# Patient Record
Sex: FEMALE | Race: WHITE | NOT HISPANIC OR LATINO | Employment: FULL TIME | URBAN - METROPOLITAN AREA
[De-identification: names, ages, dates, MRNs, and addresses within clinical notes are randomized per-mention and may not be internally consistent; named-entity substitution may affect disease eponyms.]

---

## 2018-02-16 ENCOUNTER — OFFICE VISIT CONVERTED (OUTPATIENT)
Dept: FAMILY MEDICINE CLINIC | Facility: CLINIC | Age: 53
End: 2018-02-16
Attending: NURSE PRACTITIONER

## 2018-03-14 ENCOUNTER — OFFICE VISIT CONVERTED (OUTPATIENT)
Dept: FAMILY MEDICINE CLINIC | Facility: CLINIC | Age: 53
End: 2018-03-14
Attending: NURSE PRACTITIONER

## 2018-03-27 ENCOUNTER — CONVERSION ENCOUNTER (OUTPATIENT)
Dept: GENERAL RADIOLOGY | Facility: HOSPITAL | Age: 53
End: 2018-03-27

## 2018-08-24 ENCOUNTER — OFFICE VISIT CONVERTED (OUTPATIENT)
Dept: FAMILY MEDICINE CLINIC | Facility: CLINIC | Age: 53
End: 2018-08-24
Attending: NURSE PRACTITIONER

## 2018-09-14 ENCOUNTER — OFFICE VISIT CONVERTED (OUTPATIENT)
Dept: SURGERY | Facility: CLINIC | Age: 53
End: 2018-09-14
Attending: SURGERY

## 2019-01-17 ENCOUNTER — HOSPITAL ENCOUNTER (OUTPATIENT)
Dept: FAMILY MEDICINE CLINIC | Facility: CLINIC | Age: 54
Discharge: HOME | End: 2019-01-17
Attending: NURSE PRACTITIONER

## 2019-01-23 LAB
CODEINE UR QL: <20 NG/ML
CONV 6-MAM (LCMSMS): <10 NG/ML
CONV OPIATES URINE NOROXYMORPHONE: <20 NG/ML
HYDROCODONE UR QL: <20 NG/ML
HYDROMORPHONE UR QL: <20 NG/ML
MORPHINE UR QL: <20 NG/ML
OPIATES, URINE, NORHYDROCODONE: <20 NG/ML
OPIATES, URINE, NOROXYCODONE: <20 NG/ML
OXYCODONE UR: <20 NG/ML
OXYMORPHONE UR: <20 NG/ML

## 2019-02-11 ENCOUNTER — OFFICE VISIT CONVERTED (OUTPATIENT)
Dept: FAMILY MEDICINE CLINIC | Facility: CLINIC | Age: 54
End: 2019-02-11
Attending: NURSE PRACTITIONER

## 2019-03-11 ENCOUNTER — HOSPITAL ENCOUNTER (OUTPATIENT)
Dept: LAB | Facility: HOSPITAL | Age: 54
Discharge: HOME | End: 2019-03-11
Attending: NURSE PRACTITIONER

## 2019-03-11 LAB
ALBUMIN SERPL-MCNC: 3.9 G/DL (ref 3.5–5)
ALBUMIN/GLOB SERPL: 1.2 {RATIO} (ref 1.4–2.6)
ALP SERPL-CCNC: 65 U/L (ref 53–141)
ALT SERPL-CCNC: 18 U/L (ref 10–40)
ANION GAP SERPL CALC-SCNC: 17 MMOL/L (ref 8–19)
APPEARANCE UR: ABNORMAL
AST SERPL-CCNC: 21 U/L (ref 15–50)
BASOPHILS # BLD AUTO: 0.02 10*3/UL (ref 0–0.2)
BASOPHILS NFR BLD AUTO: 0.3 % (ref 0–3)
BILIRUB SERPL-MCNC: 0.45 MG/DL (ref 0.2–1.3)
BILIRUB UR QL: NEGATIVE
BUN SERPL-MCNC: 9 MG/DL (ref 5–25)
BUN/CREAT SERPL: 15 {RATIO} (ref 6–20)
CALCIUM SERPL-MCNC: 8.8 MG/DL (ref 8.7–10.4)
CHLORIDE SERPL-SCNC: 100 MMOL/L (ref 99–111)
CHOLEST SERPL-MCNC: 130 MG/DL (ref 107–200)
CHOLEST/HDLC SERPL: 3.3 {RATIO} (ref 3–6)
COLOR UR: YELLOW
CONV ABS IMM GRAN: 0.02 10*3/UL (ref 0–0.2)
CONV BACTERIA: ABNORMAL
CONV CO2: 26 MMOL/L (ref 22–32)
CONV COLLECTION SOURCE (UA): ABNORMAL
CONV CREATININE URINE, RANDOM: 128.9 MG/DL (ref 10–300)
CONV HYALINE CASTS IN URINE MICRO: ABNORMAL /[LPF]
CONV IMMATURE GRAN: 0.3 % (ref 0–1.8)
CONV MICROALBUM.,U,RANDOM: <12 MG/L (ref 0–20)
CONV TOTAL PROTEIN: 7.2 G/DL (ref 6.3–8.2)
CONV UROBILINOGEN IN URINE BY AUTOMATED TEST STRIP: 1 {EHRLICHU}/DL (ref 0.1–1)
CREAT UR-MCNC: 0.62 MG/DL (ref 0.5–0.9)
DEPRECATED RDW RBC AUTO: 43.7 FL (ref 36.4–46.3)
EOSINOPHIL # BLD AUTO: 0.14 10*3/UL (ref 0–0.7)
EOSINOPHIL # BLD AUTO: 2.4 % (ref 0–7)
ERYTHROCYTE [DISTWIDTH] IN BLOOD BY AUTOMATED COUNT: 13.1 % (ref 11.7–14.4)
EST. AVERAGE GLUCOSE BLD GHB EST-MCNC: 148 MG/DL
GFR SERPLBLD BASED ON 1.73 SQ M-ARVRAT: >60 ML/MIN/{1.73_M2}
GLOBULIN UR ELPH-MCNC: 3.3 G/DL (ref 2–3.5)
GLUCOSE SERPL-MCNC: 145 MG/DL (ref 65–99)
GLUCOSE UR QL: NEGATIVE MG/DL
HBA1C MFR BLD: 13 G/DL (ref 12–16)
HBA1C MFR BLD: 6.8 % (ref 3.5–5.7)
HCT VFR BLD AUTO: 40.6 % (ref 37–47)
HDLC SERPL-MCNC: 39 MG/DL (ref 40–60)
HGB UR QL STRIP: NEGATIVE
KETONES UR QL STRIP: NEGATIVE MG/DL
LDLC SERPL CALC-MCNC: 69 MG/DL (ref 70–100)
LEUKOCYTE ESTERASE UR QL STRIP: ABNORMAL
LYMPHOCYTES # BLD AUTO: 1.93 10*3/UL (ref 1–5)
MAGNESIUM SERPL-MCNC: 1.92 MG/DL (ref 1.6–2.3)
MCH RBC QN AUTO: 29.2 PG (ref 27–31)
MCHC RBC AUTO-ENTMCNC: 32 G/DL (ref 33–37)
MCV RBC AUTO: 91.2 FL (ref 81–99)
MICROALBUMIN/CREAT UR: 9.3 MG/G{CRE} (ref 0–35)
MONOCYTES # BLD AUTO: 0.32 10*3/UL (ref 0.2–1.2)
MONOCYTES NFR BLD AUTO: 5.4 % (ref 3–10)
NEUTROPHILS # BLD AUTO: 3.52 10*3/UL (ref 2–8)
NEUTROPHILS NFR BLD AUTO: 59.2 % (ref 30–85)
NITRITE UR QL STRIP: NEGATIVE
NRBC CBCN: 0 % (ref 0–0.7)
OSMOLALITY SERPL CALC.SUM OF ELEC: 289 MOSM/KG (ref 273–304)
PH UR STRIP.AUTO: 6.5 [PH] (ref 5–8)
PLATELET # BLD AUTO: 305 10*3/UL (ref 130–400)
PMV BLD AUTO: 11 FL (ref 9.4–12.3)
POTASSIUM SERPL-SCNC: 4.1 MMOL/L (ref 3.5–5.3)
PROT UR QL: NEGATIVE MG/DL
RBC # BLD AUTO: 4.45 10*6/UL (ref 4.2–5.4)
RBC #/AREA URNS HPF: ABNORMAL /[HPF]
SODIUM SERPL-SCNC: 139 MMOL/L (ref 135–147)
SP GR UR: 1.02 (ref 1–1.03)
SQUAMOUS SPT QL MICRO: ABNORMAL /[HPF]
TRIGL SERPL-MCNC: 111 MG/DL (ref 40–150)
VARIANT LYMPHS NFR BLD MANUAL: 32.4 % (ref 20–45)
VLDLC SERPL-MCNC: 22 MG/DL (ref 5–37)
WBC # BLD AUTO: 5.95 10*3/UL (ref 4.8–10.8)
WBC #/AREA URNS HPF: ABNORMAL /[HPF]

## 2019-04-17 ENCOUNTER — OFFICE VISIT CONVERTED (OUTPATIENT)
Dept: CARDIOLOGY | Facility: CLINIC | Age: 54
End: 2019-04-17
Attending: INTERNAL MEDICINE

## 2019-05-10 ENCOUNTER — HOSPITAL ENCOUNTER (OUTPATIENT)
Dept: NUCLEAR MEDICINE | Facility: HOSPITAL | Age: 54
Discharge: HOME | End: 2019-05-10
Attending: INTERNAL MEDICINE

## 2019-06-05 ENCOUNTER — CONVERSION ENCOUNTER (OUTPATIENT)
Dept: FAMILY MEDICINE CLINIC | Facility: CLINIC | Age: 54
End: 2019-06-05

## 2019-06-05 ENCOUNTER — OFFICE VISIT CONVERTED (OUTPATIENT)
Dept: FAMILY MEDICINE CLINIC | Facility: CLINIC | Age: 54
End: 2019-06-05
Attending: NURSE PRACTITIONER

## 2019-06-13 ENCOUNTER — HOSPITAL ENCOUNTER (OUTPATIENT)
Dept: OTHER | Facility: HOSPITAL | Age: 54
Discharge: HOME | End: 2019-06-13
Attending: NURSE PRACTITIONER

## 2019-06-13 LAB
ALBUMIN SERPL-MCNC: 4.1 G/DL (ref 3.5–5)
ALBUMIN/GLOB SERPL: 1.4 {RATIO} (ref 1.4–2.6)
ALP SERPL-CCNC: 56 U/L (ref 53–141)
ALT SERPL-CCNC: 17 U/L (ref 10–40)
ANION GAP SERPL CALC-SCNC: 19 MMOL/L (ref 8–19)
AST SERPL-CCNC: 16 U/L (ref 15–50)
BILIRUB SERPL-MCNC: 0.52 MG/DL (ref 0.2–1.3)
BUN SERPL-MCNC: 11 MG/DL (ref 5–25)
BUN/CREAT SERPL: 15 {RATIO} (ref 6–20)
CALCIUM SERPL-MCNC: 8.9 MG/DL (ref 8.7–10.4)
CHLORIDE SERPL-SCNC: 101 MMOL/L (ref 99–111)
CHOLEST SERPL-MCNC: 119 MG/DL (ref 107–200)
CHOLEST/HDLC SERPL: 3.6 {RATIO} (ref 3–6)
CONV CO2: 28 MMOL/L (ref 22–32)
CONV CREATININE URINE, RANDOM: 60.1 MG/DL (ref 10–300)
CONV MICROALBUM.,U,RANDOM: <12 MG/L (ref 0–20)
CONV TOTAL PROTEIN: 7.1 G/DL (ref 6.3–8.2)
CREAT UR-MCNC: 0.71 MG/DL (ref 0.5–0.9)
EST. AVERAGE GLUCOSE BLD GHB EST-MCNC: 131 MG/DL
GFR SERPLBLD BASED ON 1.73 SQ M-ARVRAT: >60 ML/MIN/{1.73_M2}
GLOBULIN UR ELPH-MCNC: 3 G/DL (ref 2–3.5)
GLUCOSE SERPL-MCNC: 121 MG/DL (ref 65–99)
HBA1C MFR BLD: 6.2 % (ref 3.5–5.7)
HDLC SERPL-MCNC: 33 MG/DL (ref 40–60)
LDLC SERPL CALC-MCNC: 63 MG/DL (ref 70–100)
MICROALBUMIN/CREAT UR: 20 MG/G{CRE} (ref 0–35)
OSMOLALITY SERPL CALC.SUM OF ELEC: 299 MOSM/KG (ref 273–304)
POTASSIUM SERPL-SCNC: 4.4 MMOL/L (ref 3.5–5.3)
SODIUM SERPL-SCNC: 144 MMOL/L (ref 135–147)
TRIGL SERPL-MCNC: 116 MG/DL (ref 40–150)
VLDLC SERPL-MCNC: 23 MG/DL (ref 5–37)

## 2019-06-19 ENCOUNTER — HOSPITAL ENCOUNTER (OUTPATIENT)
Dept: GENERAL RADIOLOGY | Facility: HOSPITAL | Age: 54
Discharge: HOME | End: 2019-06-19
Attending: NURSE PRACTITIONER

## 2019-06-28 ENCOUNTER — OFFICE VISIT CONVERTED (OUTPATIENT)
Dept: SURGERY | Facility: CLINIC | Age: 54
End: 2019-06-28
Attending: SURGERY

## 2019-06-28 ENCOUNTER — CONVERSION ENCOUNTER (OUTPATIENT)
Dept: SURGERY | Facility: CLINIC | Age: 54
End: 2019-06-28

## 2019-07-15 ENCOUNTER — HOSPITAL ENCOUNTER (OUTPATIENT)
Dept: GENERAL RADIOLOGY | Facility: HOSPITAL | Age: 54
Discharge: HOME | End: 2019-07-15
Attending: NURSE PRACTITIONER

## 2019-07-31 ENCOUNTER — HOSPITAL ENCOUNTER (OUTPATIENT)
Dept: PERIOP | Facility: HOSPITAL | Age: 54
Setting detail: HOSPITAL OUTPATIENT SURGERY
Discharge: HOME | End: 2019-07-31
Attending: SURGERY

## 2019-07-31 LAB
ANION GAP SERPL CALC-SCNC: 17 MMOL/L (ref 8–19)
BUN SERPL-MCNC: 12 MG/DL (ref 5–25)
BUN/CREAT SERPL: 22 {RATIO} (ref 6–20)
CALCIUM SERPL-MCNC: 8.7 MG/DL (ref 8.7–10.4)
CHLORIDE SERPL-SCNC: 102 MMOL/L (ref 99–111)
CONV CO2: 24 MMOL/L (ref 22–32)
CREAT UR-MCNC: 0.55 MG/DL (ref 0.5–0.9)
GFR SERPLBLD BASED ON 1.73 SQ M-ARVRAT: >60 ML/MIN/{1.73_M2}
GLUCOSE BLD-MCNC: 185 MG/DL (ref 65–99)
GLUCOSE SERPL-MCNC: 143 MG/DL (ref 65–99)
OSMOLALITY SERPL CALC.SUM OF ELEC: 290 MOSM/KG (ref 273–304)
POTASSIUM SERPL-SCNC: 4.2 MMOL/L (ref 3.5–5.3)
SODIUM SERPL-SCNC: 139 MMOL/L (ref 135–147)

## 2019-08-16 ENCOUNTER — OFFICE VISIT CONVERTED (OUTPATIENT)
Dept: SURGERY | Facility: CLINIC | Age: 54
End: 2019-08-16
Attending: SURGERY

## 2019-12-05 ENCOUNTER — CONVERSION ENCOUNTER (OUTPATIENT)
Dept: CARDIOLOGY | Facility: CLINIC | Age: 54
End: 2019-12-05

## 2019-12-05 ENCOUNTER — OFFICE VISIT CONVERTED (OUTPATIENT)
Dept: CARDIOLOGY | Facility: CLINIC | Age: 54
End: 2019-12-05
Attending: INTERNAL MEDICINE

## 2019-12-09 ENCOUNTER — OFFICE VISIT CONVERTED (OUTPATIENT)
Dept: FAMILY MEDICINE CLINIC | Facility: CLINIC | Age: 54
End: 2019-12-09
Attending: NURSE PRACTITIONER

## 2019-12-20 ENCOUNTER — HOSPITAL ENCOUNTER (OUTPATIENT)
Dept: LAB | Facility: HOSPITAL | Age: 54
Discharge: HOME | End: 2019-12-20
Attending: NURSE PRACTITIONER

## 2019-12-20 LAB
ALBUMIN SERPL-MCNC: 4.3 G/DL (ref 3.5–5)
ALBUMIN/GLOB SERPL: 1.6 {RATIO} (ref 1.4–2.6)
ALP SERPL-CCNC: 60 U/L (ref 53–141)
ALT SERPL-CCNC: 12 U/L (ref 10–40)
ANION GAP SERPL CALC-SCNC: 17 MMOL/L (ref 8–19)
APPEARANCE UR: ABNORMAL
AST SERPL-CCNC: 15 U/L (ref 15–50)
BILIRUB SERPL-MCNC: 0.39 MG/DL (ref 0.2–1.3)
BILIRUB UR QL: NEGATIVE
BUN SERPL-MCNC: 10 MG/DL (ref 5–25)
BUN/CREAT SERPL: 16 {RATIO} (ref 6–20)
CALCIUM SERPL-MCNC: 8.8 MG/DL (ref 8.7–10.4)
CHLORIDE SERPL-SCNC: 101 MMOL/L (ref 99–111)
CHOLEST SERPL-MCNC: 124 MG/DL (ref 107–200)
CHOLEST/HDLC SERPL: 4.1 {RATIO} (ref 3–6)
COLOR UR: ABNORMAL
CONV BACTERIA: NEGATIVE
CONV CO2: 27 MMOL/L (ref 22–32)
CONV COLLECTION SOURCE (UA): ABNORMAL
CONV CREATININE URINE, RANDOM: 228.4 MG/DL (ref 10–300)
CONV HYALINE CASTS IN URINE MICRO: ABNORMAL /[LPF]
CONV MICROALBUM.,U,RANDOM: 13 MG/L (ref 0–20)
CONV TOTAL PROTEIN: 7 G/DL (ref 6.3–8.2)
CONV UROBILINOGEN IN URINE BY AUTOMATED TEST STRIP: 1 {EHRLICHU}/DL (ref 0.1–1)
CREAT UR-MCNC: 0.61 MG/DL (ref 0.5–0.9)
EST. AVERAGE GLUCOSE BLD GHB EST-MCNC: 151 MG/DL
GFR SERPLBLD BASED ON 1.73 SQ M-ARVRAT: >60 ML/MIN/{1.73_M2}
GLOBULIN UR ELPH-MCNC: 2.7 G/DL (ref 2–3.5)
GLUCOSE SERPL-MCNC: 142 MG/DL (ref 65–99)
GLUCOSE UR QL: NEGATIVE MG/DL
HBA1C MFR BLD: 6.9 % (ref 3.5–5.7)
HDLC SERPL-MCNC: 30 MG/DL (ref 40–60)
HGB UR QL STRIP: NEGATIVE
KETONES UR QL STRIP: NEGATIVE MG/DL
LDLC SERPL CALC-MCNC: 63 MG/DL (ref 70–100)
LEUKOCYTE ESTERASE UR QL STRIP: ABNORMAL
MAGNESIUM SERPL-MCNC: 1.8 MG/DL (ref 1.6–2.3)
MICROALBUMIN/CREAT UR: 5.7 MG/G{CRE} (ref 0–35)
NITRITE UR QL STRIP: NEGATIVE
OSMOLALITY SERPL CALC.SUM OF ELEC: 293 MOSM/KG (ref 273–304)
PH UR STRIP.AUTO: 5 [PH] (ref 5–8)
POTASSIUM SERPL-SCNC: 4.2 MMOL/L (ref 3.5–5.3)
PROT UR QL: NEGATIVE MG/DL
RBC #/AREA URNS HPF: ABNORMAL /[HPF]
SODIUM SERPL-SCNC: 141 MMOL/L (ref 135–147)
SP GR UR: 1.03 (ref 1–1.03)
SQUAMOUS SPT QL MICRO: ABNORMAL /[HPF]
T4 FREE SERPL-MCNC: 1.3 NG/DL (ref 0.9–1.8)
TRIGL SERPL-MCNC: 155 MG/DL (ref 40–150)
TSH SERPL-ACNC: 0.93 M[IU]/L (ref 0.27–4.2)
VLDLC SERPL-MCNC: 31 MG/DL (ref 5–37)
WBC #/AREA URNS HPF: ABNORMAL /[HPF]

## 2020-06-24 ENCOUNTER — HOSPITAL ENCOUNTER (OUTPATIENT)
Dept: GENERAL RADIOLOGY | Facility: HOSPITAL | Age: 55
Discharge: HOME | End: 2020-06-24
Attending: NURSE PRACTITIONER

## 2020-09-23 ENCOUNTER — HOSPITAL ENCOUNTER (OUTPATIENT)
Dept: GENERAL RADIOLOGY | Facility: HOSPITAL | Age: 55
Discharge: HOME | End: 2020-09-23
Attending: NURSE PRACTITIONER

## 2020-11-25 ENCOUNTER — OFFICE VISIT CONVERTED (OUTPATIENT)
Dept: FAMILY MEDICINE CLINIC | Facility: CLINIC | Age: 55
End: 2020-11-25
Attending: NURSE PRACTITIONER

## 2020-11-25 ENCOUNTER — HOSPITAL ENCOUNTER (OUTPATIENT)
Dept: LAB | Facility: HOSPITAL | Age: 55
Discharge: HOME | End: 2020-11-25
Attending: NURSE PRACTITIONER

## 2020-11-25 LAB
ALBUMIN SERPL-MCNC: 4.3 G/DL (ref 3.5–5)
ALBUMIN/GLOB SERPL: 1.5 {RATIO} (ref 1.4–2.6)
ALP SERPL-CCNC: 65 U/L (ref 53–141)
ALT SERPL-CCNC: 17 U/L (ref 10–40)
ANION GAP SERPL CALC-SCNC: 18 MMOL/L (ref 8–19)
AST SERPL-CCNC: 26 U/L (ref 15–50)
BILIRUB SERPL-MCNC: 0.44 MG/DL (ref 0.2–1.3)
BUN SERPL-MCNC: 10 MG/DL (ref 5–25)
BUN/CREAT SERPL: 14 {RATIO} (ref 6–20)
CALCIUM SERPL-MCNC: 8.9 MG/DL (ref 8.7–10.4)
CHLORIDE SERPL-SCNC: 102 MMOL/L (ref 99–111)
CHOLEST SERPL-MCNC: 124 MG/DL (ref 107–200)
CHOLEST/HDLC SERPL: 3.6 {RATIO} (ref 3–6)
CONV CO2: 23 MMOL/L (ref 22–32)
CONV TOTAL PROTEIN: 7.1 G/DL (ref 6.3–8.2)
CREAT UR-MCNC: 0.73 MG/DL (ref 0.5–0.9)
EST. AVERAGE GLUCOSE BLD GHB EST-MCNC: 189 MG/DL
GFR SERPLBLD BASED ON 1.73 SQ M-ARVRAT: >60 ML/MIN/{1.73_M2}
GLOBULIN UR ELPH-MCNC: 2.8 G/DL (ref 2–3.5)
GLUCOSE SERPL-MCNC: 151 MG/DL (ref 65–99)
HBA1C MFR BLD: 8.2 % (ref 3.5–5.7)
HDLC SERPL-MCNC: 34 MG/DL (ref 40–60)
LDLC SERPL CALC-MCNC: 48 MG/DL (ref 70–100)
OSMOLALITY SERPL CALC.SUM OF ELEC: 290 MOSM/KG (ref 273–304)
POTASSIUM SERPL-SCNC: 4.4 MMOL/L (ref 3.5–5.3)
SODIUM SERPL-SCNC: 139 MMOL/L (ref 135–147)
TRIGL SERPL-MCNC: 209 MG/DL (ref 40–150)
VLDLC SERPL-MCNC: 42 MG/DL (ref 5–37)

## 2020-12-09 ENCOUNTER — OFFICE VISIT CONVERTED (OUTPATIENT)
Dept: FAMILY MEDICINE CLINIC | Facility: CLINIC | Age: 55
End: 2020-12-09
Attending: NURSE PRACTITIONER

## 2020-12-09 ENCOUNTER — CONVERSION ENCOUNTER (OUTPATIENT)
Dept: FAMILY MEDICINE CLINIC | Facility: CLINIC | Age: 55
End: 2020-12-09

## 2021-05-14 VITALS
OXYGEN SATURATION: 97 % | WEIGHT: 293 LBS | HEART RATE: 68 BPM | BODY MASS INDEX: 47.09 KG/M2 | SYSTOLIC BLOOD PRESSURE: 193 MMHG | HEIGHT: 66 IN | TEMPERATURE: 97.9 F | DIASTOLIC BLOOD PRESSURE: 98 MMHG

## 2021-05-14 VITALS
OXYGEN SATURATION: 97 % | HEIGHT: 66 IN | BODY MASS INDEX: 47.09 KG/M2 | WEIGHT: 293 LBS | DIASTOLIC BLOOD PRESSURE: 110 MMHG | HEART RATE: 69 BPM | SYSTOLIC BLOOD PRESSURE: 160 MMHG | TEMPERATURE: 97.6 F

## 2021-05-14 NOTE — PROGRESS NOTES
Progress Note      Patient Name: Marlo Barney   Patient ID: [id number]   Sex: Female   YOB: 1965    Primary Care Provider: Tung SOUZA   Referring Provider: Tung SOUZA    Visit Date: December 9, 2020    Provider: HARLEY Dixon   Location: [facility]   Location Address: [address] [place] [address], [address] [place] [address]  [id number]   Location Phone: [phone]          Chief Complaint  · acute visit for elevated blood pressure  · patient states that she has had elevated blood pressure for past few weeks. She increased her Carvedilol but it is still elevated. Sometimes she feels like blood rushing to head and a twinge in her chest      History Of Present Illness  Marlo Barney is a 55 year old /White female who presents for evaluation and treatment of:      Hypertension:  doing well but not having resoloution of blood pressure issues on benazepril and coreg.  Not as high when at home. Had a small twinge of chest pain yesterday but went away.    Diabetes:  not doing well with keeping up with blood sugars.       Past Medical History  Disease Name Date Onset Notes   Allergies --  --    Anemia --  --    Anxiety --  --    Asthma --  --    Diabetes --  --    Diabetes mellitus, type 2 12/09/2019 --    Diverticulitis --  --    Essential hypertension 12/09/2019 --    Forgetfulness --  --    Gallstones --  --    Headache 12/09/2019 --    Hernia --  --    Hyperlipidemia --  --    Hypertension --  --    Lumbago 11/25/2020 --    Migraine Headaches --  --    Night sweats --  --    Reflux Disease --  --    Sinus trouble --  --          Past Surgical History  Procedure Name Date Notes   Ablation of cervix --  --    Cholecystecomy --  --    Colonoscopy 10/01/2018 --    Gastric Bypass --  --    Hernia Repair --  --    Incisional hernia repair --  --    Tonsillectomy --  --          Medication List  Name Date Started Instructions   Accu-Chek Stephanie Connect meter  miscellaneous misc 11/08/2016 use as directed once a day   Accu-Chek Stephanie Plus test strp miscellaneous strip 11/08/2016 use as directed with lancets   aspirin 81 mg oral tablet,delayed release (DR/EC)  take 1 tablet (81 mg) by oral route once daily   atorvastatin 10 mg oral tablet 09/28/2020 TAKE 1 TABLET BY MOUTH ONCE DAILY AT BEDTIME FOR 30 DAYS   benazepril 40 mg oral tablet 11/25/2020 TAKE 1 TABLET BY MOUTH ONCE DAILY   buspirone 5 mg oral tablet 11/25/2020 TAKE 1 TABLET BY MOUTH THREE TIMES DAILY AS NEEDED   carvedilol 12.5 mg oral tablet 12/03/2020 take 1 tablet (12.5 mg) by oral route 2 times per day with food for 90 days   JANUMET XR 100MG/1000MG TABLETS 11/16/2020 TAKE 1 TABLET BY MOUTH EVERY DAY WITH EVENING MEAL.   Lipitor 10 mg oral tablet 10/01/2019 TAKE 1 TABLET (10 MG) BY ORAL ROUTE ONCE DAILY AT BEDTIME FOR 30 DAYS FOR 30 DAYS FOR 30 DAYS   Maxalt 10 mg oral tablet 02/11/2019 take 1 tablet (10 mg) by oral route once, may repeat at 2 hour intervals; do not exceed 30 mg in 24 hours for 30 days   meloxicam 7.5 mg oral tablet 11/25/2020 TAKE 1 TABLET BY MOUTH ONCE DAILY FOR 30 DAYS   omeprazole 40 mg oral capsule,delayed release(DR/EC) 10/21/2020 take 1 capsule (40 mg) by oral route once daily before a meal for 90 days   Pepcid 40 mg oral tablet 10/21/2020 take 1 tablet (40 mg) by oral route once daily at bedtime for 90 days   ProAir HFA 90 mcg/actuation inhalation HFA aerosol inhaler 02/11/2019 inhale 1 - 2 puffs (90 - 180 mcg) by inhalation route every 6 hours as needed for 30 days   spironolactone 25 mg oral tablet 08/28/2020 TAKE 1 TABLET BY MOUTH EVERY OTHER DAY   tramadol 50 mg oral tablet 11/25/2020 TAKE 1 TABLET BY MOUTH EVERY 6 HOURS AS NEEDED   Tylenol Arthritis Pain 650 mg oral tablet extended release  take 2 tablets by oral route every 8 hours   Vitamin D3 50 mcg (2,000 unit) oral capsule  take 1 capsule by oral route daily         Allergy List  Allergen Name Date Reaction Notes   No known  "history of drug allergy --  --  --        Allergies Reconciled  Family Medical History  Disease Name Relative/Age Notes   *No Known Family History  --          Social History  Finding Status Start/Stop Quantity Notes   Alcohol Light --/-- --  occasionally   Second hand smoke exposure --  --/-- --  --    Tobacco Never --/-- --  --          Immunizations  NameDate Admin Mfg Trade Name Lot Number Route Inj VIS Given VIS Publication   [id number]/25/2020 [facility] Fluzone Quadrivalent [id number] IM RA 11/25/2020 08/15/2019   Comments: pt tolerated   [id number]/01/2017 NE Not Entered  NE NE 06/05/2019    Comments:          Review of Systems  · Constitutional  o Denies  o : fatigue, night sweats  · Eyes  o Denies  o : double vision, blurred vision  · HENT  o Denies  o : vertigo, recent head injury  · Breasts  o Denies  o : abnormal changes in breast size, additional breast symptoms except as noted in the HPI  · Cardiovascular  o Denies  o : chest pain, irregular heart beats  · Respiratory  o Denies  o : shortness of breath, productive cough  · Gastrointestinal  o Denies  o : nausea, vomiting  · Genitourinary  o Denies  o : dysuria, urinary retention  · Integument  o Denies  o : hair growth change, new skin lesions  · Neurologic  o Denies  o : altered mental status, seizures  · Musculoskeletal  o Denies  o : joint swelling, limitation of motion  · Endocrine  o Denies  o : cold intolerance, heat intolerance  · Heme-Lymph  o Denies  o : petechiae, lymph node enlargement or tenderness  · Allergic-Immunologic  o Denies  o : frequent illnesses      Vitals  Date Time BP Position Site L\R Cuff Size HR RR TEMP (F) WT  HT  BMI kg/m2 BSA m2 O2 Sat FR L/min FiO2        12/09/2020 08:02 /110 Sitting    69 - R  97.6 299lbs 9oz 5'  6\" 48.35 2.52 97 %            Physical Examination  · Constitutional  o Appearance  o : well-nourished, well developed, alert, in no acute distress  · Eyes  o Conjunctivae  o : conjunctivae " normal  o Sclerae  o : sclerae white  o Pupils and Irises  o : pupils equal, round, and reactive to light and accommodation bilaterally  o Corneas  o : tear film normal, no lesions present  o Eyelids/Ocular Adnexae  o : eyelid appearance normal, no exudates present, eye moisture level normal  · Respiratory  o Respiratory Effort  o : breathing unlabored  o Inspection of Chest  o : normal appearance, no retractions  o Auscultation of Lungs  o : normal breath sounds throughout  · Cardiovascular  o Heart  o :   § Auscultation of Heart  § : regular rate and rhythm without murmur, PMI normal  o Peripheral Vascular System  o :   § Extremities  § : no cyanosis, clubbing or edema; less than 2 second refill noted  · Musculoskeletal  o General  o : No joint swelling or deformity noted. Muscle tone, strength and development grossly normal.  · Skin and Subcutaneous Tissue  o General Inspection  o : no rashes or lesions present, no areas of discoloration  · Neurologic  o Mental Status Examination  o : judgement, insight intact, modd and affect appropriate  o Motor Examination  o : strength grossly intact in all four extremities  o Gait and Station  o : normal gait, able to stand without difficulty          Assessment  · Diabetes mellitus, type 2     250.00/E11.9  · Diabetes     250.00/E11.9  · Hypertension     401.9/I10       will start amlodipine and if blood pressure still over 170 to take clonidine.  Off this week from work to manage blood pressure       Plan  · Orders  o ACO-39: Current medications updated and reviewed (, 5331F) - - 12/09/2020  o ACO-14: Influenza immunization administered or previously received [facility] () - - 12/09/2020  o ACO-41: Dilated Diabetic eye exam completed this year and results in chart/reviewed (2022F) - - 12/09/2020  · Medications  o amlodipine 5 mg oral tablet   SIG: take 1 tablet (5 mg) by oral route once daily for 90 days   DISP: (90) Tablet with 1 refills  Prescribed on 12/09/2020      o clonidine HCl 0.1 mg oral tablet   SIG: take 1 tablet (0.1 mg) by oral route once daily as needed.   DISP: (30) Tablet with 1 refills  Prescribed on 12/09/2020     o amlodipine 5 mg oral tablet   SIG: take 1 tablet (5 mg) by oral route once daily for 90 days   DISP: (90) tablets with 1 refills  Discontinued on 12/09/2020     o Coreg 6.25 mg oral tablet   SIG: take 1 tablet (6.25 mg) by oral route 2 times per day with food for 90 days   DISP: (180) tablets with 1 refills  Discontinued on 12/09/2020     o Janumet -1,000 mg oral tablet, ER multiphase 24 hr   SIG: TAKE 1 TABLET BY MOUTH EVERY DAY WITH EVENING MEAL.   DISP: (30) Tablet with -1 refills  Discontinued on 12/09/2020     o Zithromax Z-Kumar 250 mg oral tablet   SIG: take 2 tablets (500 mg) by oral route once daily for 1 day then 1 tablet (250 mg) by oral route once daily for 4 days   DISP: (6) tablets with 0 refills  Discontinued on 12/09/2020     · Instructions  o Patient was educated/instructed on their diagnosis, treatment and medications prior to discharge from the clinic today.            Electronically Signed by: HARLEY Dixon -Author on December 9, 2020 08:48:34 AM

## 2021-05-14 NOTE — PROGRESS NOTES
Progress Note      Patient Name: Marlo Barney   Patient ID: [id number]   Sex: Female   YOB: 1965    Primary Care Provider: Tung SOUZA   Referring Provider: Tung SOUZA    Visit Date: November 25, 2020    Provider: HARLEY Dixon   Location: [facility]   Location Address: [address] [place] [address], [address] [place] [address]  [id number]   Location Phone: [phone]          Chief Complaint  · f/u  · would like to get a handicap placard for sciatica and asthma  · f/u HTN, hyperlipidemia, asthma, diabetes      History Of Present Illness  Marlo Barney is a 55 year old /White female who presents for evaluation and treatment of:      Diabetes: Patient is doing well on medication.  Has been having no issues.  However, she is not checking her blood sugars.    Asthma: Doing well with her inhalers..      Lumbago/sciatic: Patient is doing well with her tramadol just needs a refill of tramadol.  The meloxicam really helps even though her cardiologist does not want her on it she can hardly walk without taking it.    Hypertension.  Blood pressure was elevated today we will recheck it was 193/98.    Headache: Well with her medication.       Past Medical History  Disease Name Date Onset Notes   Allergies --  --    Anemia --  --    Anxiety --  --    Asthma --  --    Diabetes --  --    Diabetes mellitus, type 2 12/09/2019 --    Diverticulitis --  --    Essential hypertension 12/09/2019 --    Forgetfulness --  --    Gallstones --  --    Headache 12/09/2019 --    Hernia --  --    Hyperlipidemia --  --    Hypertension --  --    Migraine Headaches --  --    Night sweats --  --    Reflux Disease --  --    Sinus trouble --  --          Past Surgical History  Procedure Name Date Notes   Ablation of cervix --  --    Cholecystecomy --  --    Colonoscopy 10/01/2018 --    Gastric Bypass --  --    Hernia Repair --  --    Incisional hernia repair --  --    Tonsillectomy --   --          Medication List  Name Date Started Instructions   Accu-Chek Stephanie Connect meter miscellaneous misc 11/08/2016 use as directed once a day   Accu-Chek Stephanie Plus test strp miscellaneous strip 11/08/2016 use as directed with lancets   amlodipine 5 mg oral tablet 11/05/2019 take 1 tablet (5 mg) by oral route once daily for 90 days   aspirin 81 mg oral tablet,delayed release (DR/EC)  take 1 tablet (81 mg) by oral route once daily   atorvastatin 10 mg oral tablet 09/28/2020 TAKE 1 TABLET BY MOUTH ONCE DAILY AT BEDTIME FOR 30 DAYS   benazepril 40 mg oral tablet 04/27/2020 TAKE 1 TABLET BY MOUTH ONCE DAILY   buspirone 5 mg oral tablet 07/31/2020 TAKE 1 TABLET BY MOUTH THREE TIMES DAILY AS NEEDED   carvedilol 6.25 mg oral tablet 10/28/2020 Take 1 tablet by mouth twice daily with food   Coreg 6.25 mg oral tablet 12/09/2019 take 1 tablet (6.25 mg) by oral route 2 times per day with food for 90 days   Janumet -1,000 mg oral tablet, ER multiphase 24 hr 08/14/2020 TAKE 1 TABLET BY MOUTH EVERY DAY WITH EVENING MEAL.   JANUMET XR 100MG/1000MG TABLETS 11/16/2020 TAKE 1 TABLET BY MOUTH EVERY DAY WITH EVENING MEAL.   Lipitor 10 mg oral tablet 10/01/2019 TAKE 1 TABLET (10 MG) BY ORAL ROUTE ONCE DAILY AT BEDTIME FOR 30 DAYS FOR 30 DAYS FOR 30 DAYS   Maxalt 10 mg oral tablet 02/11/2019 take 1 tablet (10 mg) by oral route once, may repeat at 2 hour intervals; do not exceed 30 mg in 24 hours for 30 days   meloxicam 7.5 mg oral tablet 07/31/2020 TAKE 1 TABLET BY MOUTH ONCE DAILY FOR 30 DAYS   omeprazole 40 mg oral capsule,delayed release(DR/EC) 10/21/2020 take 1 capsule (40 mg) by oral route once daily before a meal for 90 days   Pepcid 40 mg oral tablet 10/21/2020 take 1 tablet (40 mg) by oral route once daily at bedtime for 90 days   ProAir HFA 90 mcg/actuation inhalation HFA aerosol inhaler 02/11/2019 inhale 1 - 2 puffs (90 - 180 mcg) by inhalation route every 6 hours as needed for 30 days   spironolactone 25 mg oral  tablet 08/28/2020 TAKE 1 TABLET BY MOUTH EVERY OTHER DAY   tramadol 50 mg oral tablet 07/31/2020 TAKE 1 TABLET BY MOUTH EVERY 6 HOURS AS NEEDED   Tylenol Arthritis Pain 650 mg oral tablet extended release  take 2 tablets by oral route every 8 hours   Vitamin D3 50 mcg (2,000 unit) oral capsule  take 1 capsule by oral route daily   Zithromax Z-Kumar 250 mg oral tablet 12/09/2019 take 2 tablets (500 mg) by oral route once daily for 1 day then 1 tablet (250 mg) by oral route once daily for 4 days         Allergy List  Allergen Name Date Reaction Notes   No known history of drug allergy --  --  --        Allergies Reconciled  Family Medical History  Disease Name Relative/Age Notes   *No Known Family History  --          Social History  Finding Status Start/Stop Quantity Notes   Alcohol Light --/-- --  occasionally   Second hand smoke exposure --  --/-- --  --    Tobacco Never --/-- --  --          Immunizations  NameDate Admin Mfg Trade Name Lot Number Route Inj VIS Given VIS Publication   [id number]/01/2018 [employer] Fluarix, quadrivalent, preservative free [id number] NE NE 06/05/2019    Comments:    [id number]/01/2017 NE Not Entered  NE NE 06/05/2019    Comments:          Review of Systems  · Constitutional  o Denies  o : fatigue, night sweats  · Eyes  o Denies  o : double vision, blurred vision  · HENT  o Denies  o : vertigo, recent head injury  · Breasts  o Denies  o : abnormal changes in breast size, additional breast symptoms except as noted in the HPI  · Cardiovascular  o Denies  o : chest pain, irregular heart beats  · Respiratory  o Denies  o : shortness of breath, productive cough  · Gastrointestinal  o Denies  o : nausea, vomiting  · Genitourinary  o Denies  o : dysuria, urinary retention  · Integument  o Denies  o : hair growth change, new skin lesions  · Neurologic  o Denies  o : altered mental status, seizures  · Musculoskeletal  o Denies  o : joint swelling, limitation of motion  · Endocrine  o Denies  o : cold  "intolerance, heat intolerance  · Heme-Lymph  o Denies  o : petechiae, lymph node enlargement or tenderness  · Allergic-Immunologic  o Denies  o : frequent illnesses      Vitals  Date Time BP Position Site L\R Cuff Size HR RR TEMP (F) WT  HT  BMI kg/m2 BSA m2 O2 Sat FR L/min FiO2 HC       11/25/2020 07:42 /98 Sitting    68 - R  97.9 299lbs 0oz 5'  6\" 48.26 2.51 97 %            Physical Examination  · Constitutional  o Appearance  o : well-nourished, well developed, alert, in no acute distress  · Eyes  o Conjunctivae  o : conjunctivae normal  o Sclerae  o : sclerae white  o Pupils and Irises  o : pupils equal, round, and reactive to light and accommodation bilaterally  o Corneas  o : tear film normal, no lesions present  o Eyelids/Ocular Adnexae  o : eyelid appearance normal, no exudates present, eye moisture level normal  · Respiratory  o Respiratory Effort  o : breathing unlabored  o Inspection of Chest  o : normal appearance, no retractions  o Auscultation of Lungs  o : normal breath sounds throughout  · Cardiovascular  o Heart  o :   § Auscultation of Heart  § : regular rate and rhythm without murmur, PMI normal  o Peripheral Vascular System  o :   § Extremities  § : no cyanosis, clubbing or edema; less than 2 second refill noted  · Musculoskeletal  o General  o : No joint swelling or deformity noted. Muscle tone, strength and development grossly normal.  · Skin and Subcutaneous Tissue  o General Inspection  o : no rashes or lesions present, no areas of discoloration  · Neurologic  o Mental Status Examination  o : judgement, insight intact, modd and affect appropriate  o Motor Examination  o : strength grossly intact in all four extremities  o Gait and Station  o : normal gait, able to stand without difficulty          Results  · In-Office Procedures  o Lab procedure  § IOP - Urine Drug Screen In-House [facility] (48417)   § Amphetamines Ur Ql: Negative   § Barbiturates Ur Ql: Negative   § Buprenorphine+Nor Ur Ql " Scn: Negative   § Benzodiaz Ur Ql: Negative   § Cocaine Ur Ql: Negative   § Methadone Ur Ql: Negative   § Methamphet Ur Ql: Negative   § MDMA Ur Ql Scn: Negative   § Opiates Ur Ql: Negative   § Oxycodone Ur Ql: Negative   § PCP Ur Ql: Negative   § THC Ur Ql: Negative   § Temp in Range?: Within/Acceptable   § Control Seen?: Yes       Assessment  · Screening for depression     V79.0/Z13.89  · Need for influenza vaccination     V04.81/Z23  · Asthma     493.90/J45.909  · Diabetes mellitus, type 2     250.00/E11.9  · Headache     784.0/R51  · Hyperlipidemia     272.4/E78.5  · Lumbago     724.2/M54.5  · Controlled substance agreement signed     V58.69/Z79.899  · Long term use of drug     V58.69/Z79.899  · Hypertension     401.9/I10  · BMI 45.0-49.9, adult     V85.42/Z68.42      Plan  · Orders  o Annual depression screening, 15 minutes (, [address]3784) - V79.0/Z13.89 - 11/25/2020  o ACO-18: Negative screen for clinical depression using a standardized tool () - V79.0/Z13.89 - 11/25/2020  o Immunization Admin Fee (Single) ([facility]) (90162) - V04.81/Z23 - 11/25/2020  o Fluzone Quadrivalent Vaccine, age 6 months + (42771) - V04.81/Z23 - 11/25/2020   Vaccine - Influenza; Dose: 0.5; Site: Right Arm; Route: Intramuscular; Date: 11/25/2020 07:55:00; Exp: 06/30/2021; Lot: [id number]; Mfg: sanofi pasteur; TradeName: Fluzone Quadrivalent; Administered By: Delia De Los Santos MA; Comment: pt tolerated  o Diabetes 2 Panel (Urine Microalbumin, CMP, Lipid, A1c, ) [facility] (90220, 34961, 76037, 26443) - - 11/25/2020  o ACO-39: Current medications updated and reviewed (1159F, ) - - 11/25/2020  · Medications  o benazepril 40 mg oral tablet   SIG: TAKE 1 TABLET BY MOUTH ONCE DAILY   DISP: (90) Tablet with 1 refills  Refilled on 11/25/2020     o buspirone 5 mg oral tablet   SIG: TAKE 1 TABLET BY MOUTH THREE TIMES DAILY AS NEEDED   DISP: (90) Tablet with 4 refills  Refilled on 11/25/2020     o meloxicam 7.5 mg oral tablet   SIG: TAKE 1 TABLET BY  MOUTH ONCE DAILY FOR 30 DAYS   DISP: (30) Tablet with 4 refills  Refilled on 11/25/2020     o Medications have been Reconciled  o Transition of Care or Provider Policy  · Instructions  o Depression Screen completed and scanned into the EMR under the designated folder within the patient's documents.  o Today's PHQ-9 result is _4__  o Advised that cheeses and other sources of dairy fats, animal fats, fast food, and the extras (candy, pastries, pies, doughnuts and cookies) all contain LDL raising nutrients. Advised to increase fruits, vegetables, whole grains, and to monitor portion sizes.   o Patient was educated/instructed on their diagnosis, treatment and medications prior to discharge from the clinic today.  · Disposition  o Return in 6 months            Electronically Signed by: HARLEY Dixon -Author on November 25, 2020 11:12:15 AM

## 2021-05-15 VITALS
DIASTOLIC BLOOD PRESSURE: 76 MMHG | HEART RATE: 62 BPM | SYSTOLIC BLOOD PRESSURE: 140 MMHG | HEIGHT: 65 IN | WEIGHT: 293 LBS | BODY MASS INDEX: 48.82 KG/M2

## 2021-05-15 VITALS
DIASTOLIC BLOOD PRESSURE: 83 MMHG | WEIGHT: 284 LBS | SYSTOLIC BLOOD PRESSURE: 137 MMHG | OXYGEN SATURATION: 97 % | TEMPERATURE: 97.7 F | BODY MASS INDEX: 45.64 KG/M2 | HEART RATE: 58 BPM | HEIGHT: 66 IN | RESPIRATION RATE: 18 BRPM

## 2021-05-15 VITALS
SYSTOLIC BLOOD PRESSURE: 142 MMHG | BODY MASS INDEX: 47.48 KG/M2 | WEIGHT: 285 LBS | DIASTOLIC BLOOD PRESSURE: 90 MMHG | HEART RATE: 64 BPM | HEIGHT: 65 IN

## 2021-05-15 VITALS
BODY MASS INDEX: 46.93 KG/M2 | WEIGHT: 292 LBS | HEIGHT: 66 IN | SYSTOLIC BLOOD PRESSURE: 164 MMHG | RESPIRATION RATE: 16 BRPM | HEART RATE: 67 BPM | OXYGEN SATURATION: 96 % | DIASTOLIC BLOOD PRESSURE: 103 MMHG | TEMPERATURE: 98 F

## 2021-05-15 VITALS — WEIGHT: 284 LBS | HEIGHT: 66 IN | RESPIRATION RATE: 18 BRPM | BODY MASS INDEX: 45.64 KG/M2

## 2021-05-15 VITALS
HEART RATE: 85 BPM | WEIGHT: 279 LBS | RESPIRATION RATE: 18 BRPM | TEMPERATURE: 98.4 F | DIASTOLIC BLOOD PRESSURE: 84 MMHG | OXYGEN SATURATION: 98 % | SYSTOLIC BLOOD PRESSURE: 150 MMHG | BODY MASS INDEX: 44.84 KG/M2 | HEIGHT: 66 IN

## 2021-05-15 VITALS — HEIGHT: 66 IN | WEIGHT: 283 LBS | RESPIRATION RATE: 16 BRPM | BODY MASS INDEX: 45.48 KG/M2

## 2021-05-16 VITALS
HEART RATE: 70 BPM | BODY MASS INDEX: 46.45 KG/M2 | SYSTOLIC BLOOD PRESSURE: 150 MMHG | WEIGHT: 289 LBS | DIASTOLIC BLOOD PRESSURE: 89 MMHG | RESPIRATION RATE: 16 BRPM | TEMPERATURE: 97.9 F | OXYGEN SATURATION: 98 % | HEIGHT: 66 IN

## 2021-05-16 VITALS
OXYGEN SATURATION: 99 % | WEIGHT: 280 LBS | RESPIRATION RATE: 16 BRPM | HEART RATE: 75 BPM | SYSTOLIC BLOOD PRESSURE: 150 MMHG | DIASTOLIC BLOOD PRESSURE: 72 MMHG | HEIGHT: 66 IN | TEMPERATURE: 97.6 F | BODY MASS INDEX: 45 KG/M2

## 2021-05-16 VITALS
DIASTOLIC BLOOD PRESSURE: 87 MMHG | WEIGHT: 287 LBS | OXYGEN SATURATION: 98 % | RESPIRATION RATE: 16 BRPM | SYSTOLIC BLOOD PRESSURE: 146 MMHG | HEART RATE: 85 BPM | HEIGHT: 66 IN | TEMPERATURE: 97.6 F | BODY MASS INDEX: 46.12 KG/M2

## 2021-05-16 VITALS — BODY MASS INDEX: 44.55 KG/M2 | WEIGHT: 277.18 LBS | HEIGHT: 66 IN | RESPIRATION RATE: 16 BRPM

## 2021-05-26 ENCOUNTER — CONVERSION ENCOUNTER (OUTPATIENT)
Dept: FAMILY MEDICINE CLINIC | Facility: CLINIC | Age: 56
End: 2021-05-26

## 2021-05-26 ENCOUNTER — OFFICE VISIT CONVERTED (OUTPATIENT)
Dept: FAMILY MEDICINE CLINIC | Facility: CLINIC | Age: 56
End: 2021-05-26
Attending: NURSE PRACTITIONER

## 2021-05-26 ENCOUNTER — HOSPITAL ENCOUNTER (OUTPATIENT)
Dept: FAMILY MEDICINE CLINIC | Facility: CLINIC | Age: 56
Discharge: HOME | End: 2021-05-26
Attending: NURSE PRACTITIONER

## 2021-05-26 LAB — HEMOCCULT STL QL IA: NEGATIVE

## 2021-06-01 LAB
CONV LAST MENSTURAL PERIOD: NORMAL
SPECIMEN SOURCE: NORMAL
SPECIMEN SOURCE: NORMAL
THIN PREP CVX: NORMAL

## 2021-06-05 NOTE — PROGRESS NOTES
Progress Note      Patient Name: Marlo Barney   Patient ID: [id number]   Sex: Female   YOB: 1965    Primary Care Provider: Tung SOUZA   Referring Provider: Tung SOUZA    Visit Date: May 26, 2021    Provider: HARLEY Dixon   Location: [facility]   Location Address: [address] [place] [address], [address] [place] [address]  [id number]   Location Phone: [phone]          Chief Complaint  · Annual Exam  · PAP exam  · (Health Maintainence Information Reviewed Under Results)      History Of Present Illness  Marlo Barney is a 55 year old /White female who presents for evaluation and treatment of:   No current complaints.      patient here for well woman.  Not having any issues.  Changed jobs so no longer needing to take clonidine.       Past Medical History  Disease Name Date Onset Notes   Allergies --  --    Anemia --  --    Anxiety --  --    Asthma --  --    Diabetes --  --    Diabetes mellitus, type 2 12/09/2019 --    Diverticulitis --  --    Essential hypertension 12/09/2019 --    Forgetfulness --  --    Gallstones --  --    Headache 12/09/2019 --    Hernia --  --    Hyperlipidemia --  --    Hypertension --  --    Lumbago 11/25/2020 --    Migraine Headaches --  --    Night sweats --  --    Reflux Disease --  --    Sinus trouble --  --          Past Surgical History  Procedure Name Date Notes   Ablation of cervix --  --    Cholecystecomy --  --    Colonoscopy 10/01/2018 --    Gastric Bypass --  --    Hernia Repair --  --    Incisional hernia repair --  --    Tonsillectomy --  --          Medication List  Name Date Started Instructions   Accu-Chek Stephanie Connect meter miscellaneous misc 11/08/2016 use as directed once a day   Accu-Chek Stephanie Plus test strp miscellaneous strip 11/08/2016 use as directed with lancets   amlodipine 10 mg oral tablet 04/15/2021 take 1 tablet (10 mg) by oral route once daily for 90 days   aspirin 81 mg oral tablet,delayed  release (DR/EC)  take 1 tablet (81 mg) by oral route once daily   atorvastatin 10 mg oral tablet 04/19/2021 TAKE 1 TABLET BY MOUTH ONCE DAILY AT BEDTIME FOR 30 DAYS for 90 days   benazepril 40 mg oral tablet 04/19/2021 TAKE 1 TABLET BY MOUTH ONCE DAILY   buspirone 5 mg oral tablet 05/21/2021 take 1 tablet by oral route 3 times a day for 90 days   carvedilol 12.5 mg oral tablet 04/15/2021 take 1 tablet (12.5 mg) by oral route 2 times per day with food for 90 days   clonidine HCl 0.1 mg oral tablet 04/15/2021 take 1 tablet (0.1 mg) by oral route once daily as needed.   Maxalt 10 mg oral tablet 02/11/2019 take 1 tablet (10 mg) by oral route once, may repeat at 2 hour intervals; do not exceed 30 mg in 24 hours for 30 days   meloxicam 7.5 mg oral tablet 04/15/2021 TAKE 1 TABLET BY MOUTH ONCE DAILY FOR 30 DAYS for 90 days   metformin 500 mg oral tablet extended release 24 hr 04/28/2021 take 2 tablets (1,000 mg) by oral route 2 times per day with meals for 90 days   omeprazole 40 mg oral capsule,delayed release(DR/EC) 04/15/2021 take 1 capsule (40 mg) by oral route once daily before a meal for 90 days   Pepcid 40 mg oral tablet 04/15/2021 take 1 tablet (40 mg) by oral route once daily at bedtime for 90 days   ProAir HFA 90 mcg/actuation inhalation HFA aerosol inhaler 02/11/2019 inhale 1 - 2 puffs (90 - 180 mcg) by inhalation route every 6 hours as needed for 30 days   spironolactone 25 mg oral tablet 05/20/2021 take 1 tablet by oral route M,W,F   tramadol 50 mg oral tablet 05/19/2021 TAKE 1 TABLET BY MOUTH EVERY 6 HOURS AS NEEDED   Tylenol Arthritis Pain 650 mg oral tablet extended release  take 2 tablets by oral route every 8 hours   Vitamin D3 50 mcg (2,000 unit) oral capsule  take 1 capsule by oral route daily         Allergy List  Allergen Name Date Reaction Notes   No known history of drug allergy --  --  --          Family Medical History  Disease Name Relative/Age Notes   *No Known Family History  --          Social  "History  Finding Status Start/Stop Quantity Notes   Alcohol Light --/-- --  occasionally   Second hand smoke exposure --  --/-- --  --    Tobacco Never --/-- --  --          Immunizations  NameDate Admin Mfg Trade Name Lot Number Route Inj VIS Given VIS Publication   [id number]/25/2020 PMC Fluzone Quadrivalent [id number] IM RA 11/25/2020 08/15/2019   Comments: pt tolerated   [id number]/01/2017 NE Not Entered  NE NE 06/05/2019    Comments:          Review of Systems  · Constitutional  o Denies  o : fever, fatigue, weight loss, weight gain  · Cardiovascular  o Denies  o : lower extremity edema, claudication, chest pressure, palpitations  · Respiratory  o Denies  o : shortness of breath, wheezing, cough, hemoptysis, dyspnea on exertion  · Gastrointestinal  o Denies  o : nausea, vomiting, diarrhea, constipation, abdominal pain      Vitals  Date Time BP Position Site L\R Cuff Size HR RR TEMP (F) WT  HT  BMI kg/m2 BSA m2 O2 Sat FR L/min FiO2        05/26/2021 08:09 /74 Sitting    62 - R 16 96.8 292lbs 16oz 5'  6\" 47.29 2.49 98 %  21%          Physical Examination  · Constitutional  o Appearance  o : well-nourished, in no acute distress  · Neck  o Inspection/Palpation  o : normal appearance, no masses or tenderness, trachea midline  o Thyroid  o : gland size normal, nontender, no nodules or masses present on palpation  · Respiratory  o Respiratory Effort  o : breathing unlabored  o Inspection of Chest  o : normal appearance  o Auscultation of Lungs  o : normal breath sounds throughout  · Cardiovascular  o Heart  o :   § Auscultation of Heart  § : regular rate and rhythm, no murmurs, gallops or rubs  · Breasts  o Inspection of Breasts  o : breasts symmetrical, no skin changes, no deformities present, no discharge present  o Palpation of Breasts, Axillae  o : no masses present on palpation, no breast tenderness  · Gastrointestinal  o Abdominal Examination  o : abdomen nontender to palpation, tone normal without " rigidity or guarding, no masses present, normal bowel sounds  · Genitourinary  o External Genitalia  o : no inflammation, no lesions present  o Vagina  o : normal vaginal vault, no discharge present, no inflammatory lesions present, no masses present  o Bladder  o : nontender to palpation  o Cervix  o : appearance healthy, no lesions present, nontender to palpation, no discharges, no bleeding present, normal midline position  o Uterus  o : nontender to palpation, no masses present, position midline/midplane  o Adnexa  o : no tenderness or masses present on bimanual examination  o Anus  o : no inflammation or lesions present  o Perineum  o : perineum within normal limits  · Lymphatic  o Neck  o : no lymphadenopathy present  o Axilla  o : no lymphadenopathy present  o Groin  o : no lymphadenopathy present  · Neurologic  o Mental Status Examination  o :   § Orientation  § : grossly oriented to person, place and time  o Gait and Station  o : normal gait, able to stand without difficulty  · Psychiatric  o Judgement and Insight  o : judgment and insight intact  o Mood and Affect  o : mood normal, affect appropriate          Results  · In-Office Procedures  o Lab procedure  § IOP - Fecal Occult Blood Testing (12838)   § Hemocult Stl Ql: Negative       Assessment  · Routine gynecological examination     V72.31/Z01.419  · Pap Smear     V76.2/Z01.419      Plan  · Orders  o Pap smear (24675) - V76.2/Z01.419 - 05/26/2021  o ACO-14: Influenza immunization administered or previously received [facility] () - - 05/26/2021  o ACO-39: Current medications updated and reviewed (, 1159F) - - 05/26/2021  · Instructions  o **Pap Test/Liquid Based:   o Thin Prep  o Source:   o Cervix  o ********  o **Perform Reflex Human Papilloma Virus (HPV) High Risk on this Pap (If atypical squamous cells of the undetermined signifigcance (ASCUS)/Atypical Glandular Cells of undetermined significance (AGCUS): Low Grade Squamous Intraepitheal lesion  (LGSIL): yes**  o No  o ********  o Medicare:  o **Is this an annual PAP:  o Yes  o **Clinical History  o Post Menopausal:  o Previous Pap date: 2017  o Lab in which Performed:  o Normal  o Patient was educated/instructed on their diagnosis, treatment and medications prior to discharge from the clinic today.  o Minutes spent with patient including greater than 50% in Education/Counseling/Care Coordination.  o Time spent with the patient was minutes, more than 50% face to face.  o Counseled on monthly breast self exams.   o Counseled on STD prevention.  o Counseled on diet and exercise.   o Counseled on weight-bearing exercise.  o Recommended Calcium with Vitamin D twice daily.  · Disposition  o F/u as scheduled.            Electronically Signed by: HARLEY Dixon -Author on May 26, 2021 09:08:13 AM

## 2021-06-12 ENCOUNTER — TRANSCRIBE ORDERS (OUTPATIENT)
Dept: ADMINISTRATIVE | Facility: HOSPITAL | Age: 56
End: 2021-06-12

## 2021-06-12 DIAGNOSIS — R91.1 PULMONARY NODULE: Primary | ICD-10-CM

## 2021-06-30 DIAGNOSIS — M54.9 CHRONIC BACK PAIN, UNSPECIFIED BACK LOCATION, UNSPECIFIED BACK PAIN LATERALITY: Primary | ICD-10-CM

## 2021-06-30 DIAGNOSIS — G89.29 CHRONIC BACK PAIN, UNSPECIFIED BACK LOCATION, UNSPECIFIED BACK PAIN LATERALITY: Primary | ICD-10-CM

## 2021-07-01 RX ORDER — TRAMADOL HYDROCHLORIDE 50 MG/1
TABLET ORAL
Qty: 90 TABLET | Refills: 0 | Status: SHIPPED | OUTPATIENT
Start: 2021-07-01 | End: 2021-07-29 | Stop reason: SDUPTHER

## 2021-07-07 RX ORDER — BUSPIRONE HYDROCHLORIDE 5 MG/1
5 TABLET ORAL 3 TIMES DAILY
Qty: 90 TABLET | Refills: 1 | Status: SHIPPED | OUTPATIENT
Start: 2021-07-07 | End: 2021-10-27 | Stop reason: SDUPTHER

## 2021-07-07 RX ORDER — BUSPIRONE HYDROCHLORIDE 5 MG/1
TABLET ORAL
COMMUNITY
Start: 2021-05-21 | End: 2021-07-07 | Stop reason: SDUPTHER

## 2021-07-15 VITALS
SYSTOLIC BLOOD PRESSURE: 142 MMHG | BODY MASS INDEX: 47.09 KG/M2 | WEIGHT: 293 LBS | RESPIRATION RATE: 16 BRPM | HEART RATE: 62 BPM | HEIGHT: 66 IN | DIASTOLIC BLOOD PRESSURE: 74 MMHG | TEMPERATURE: 96.8 F | OXYGEN SATURATION: 98 %

## 2021-07-29 ENCOUNTER — TELEPHONE (OUTPATIENT)
Dept: FAMILY MEDICINE CLINIC | Facility: CLINIC | Age: 56
End: 2021-07-29

## 2021-07-29 DIAGNOSIS — G89.29 CHRONIC BACK PAIN, UNSPECIFIED BACK LOCATION, UNSPECIFIED BACK PAIN LATERALITY: ICD-10-CM

## 2021-07-29 DIAGNOSIS — M54.9 CHRONIC BACK PAIN, UNSPECIFIED BACK LOCATION, UNSPECIFIED BACK PAIN LATERALITY: ICD-10-CM

## 2021-07-29 RX ORDER — TRAMADOL HYDROCHLORIDE 50 MG/1
50 TABLET ORAL EVERY 6 HOURS PRN
Qty: 90 TABLET | Refills: 0 | Status: SHIPPED | OUTPATIENT
Start: 2021-07-29 | End: 2021-09-02

## 2021-07-29 NOTE — TELEPHONE ENCOUNTER
Caller: Savhumberto Tamiesalome Grissom    Relationship: Self    Best call back number: [phone]  Medication needed:   Requested Prescriptions      No prescriptions requested or ordered in this encounter   TREMADOL  When do you need the refill by: TODAY    What additional details did the patient provide when requesting the medication: THE PHARMACY NEEDS THE MEDICATION TO BE CALLED IN    Does the patient have less than a 3 day supply:  [x] Yes  [] No    What is the patient's preferred pharmacy:    [facility]/pharmacy #[id number] - [place], [place] - [address] - [phone]

## 2021-08-30 DIAGNOSIS — M54.9 CHRONIC BACK PAIN, UNSPECIFIED BACK LOCATION, UNSPECIFIED BACK PAIN LATERALITY: ICD-10-CM

## 2021-08-30 DIAGNOSIS — G89.29 CHRONIC BACK PAIN, UNSPECIFIED BACK LOCATION, UNSPECIFIED BACK PAIN LATERALITY: ICD-10-CM

## 2021-09-02 RX ORDER — TRAMADOL HYDROCHLORIDE 50 MG/1
TABLET ORAL
Qty: 90 TABLET | Refills: 0 | Status: SHIPPED | OUTPATIENT
Start: 2021-09-02 | End: 2021-10-04

## 2021-09-27 RX ORDER — OMEPRAZOLE 40 MG/1
CAPSULE, DELAYED RELEASE ORAL
Qty: 90 CAPSULE | Refills: 0 | Status: SHIPPED | OUTPATIENT
Start: 2021-09-27 | End: 2021-12-27

## 2021-09-27 RX ORDER — FAMOTIDINE 40 MG/1
TABLET, FILM COATED ORAL
Qty: 90 TABLET | Refills: 0 | Status: SHIPPED | OUTPATIENT
Start: 2021-09-27 | End: 2021-12-27

## 2021-09-27 RX ORDER — ATORVASTATIN CALCIUM 10 MG/1
TABLET, FILM COATED ORAL
Qty: 90 TABLET | Refills: 0 | Status: SHIPPED | OUTPATIENT
Start: 2021-09-27 | End: 2021-12-27

## 2021-09-27 RX ORDER — MELOXICAM 7.5 MG/1
TABLET ORAL
Qty: 90 TABLET | Refills: 0 | Status: SHIPPED | OUTPATIENT
Start: 2021-09-27 | End: 2021-12-27

## 2021-09-27 RX ORDER — AMLODIPINE BESYLATE 10 MG/1
TABLET ORAL
Qty: 90 TABLET | Refills: 0 | Status: SHIPPED | OUTPATIENT
Start: 2021-09-27 | End: 2021-12-27

## 2021-09-27 RX ORDER — CARVEDILOL 12.5 MG/1
TABLET ORAL
Qty: 180 TABLET | Refills: 0 | Status: SHIPPED | OUTPATIENT
Start: 2021-09-27 | End: 2022-03-28

## 2021-09-27 RX ORDER — BENAZEPRIL HYDROCHLORIDE 40 MG/1
TABLET, FILM COATED ORAL
Qty: 90 TABLET | Refills: 0 | Status: SHIPPED | OUTPATIENT
Start: 2021-09-27 | End: 2021-11-11 | Stop reason: SDUPTHER

## 2021-10-03 DIAGNOSIS — M54.9 CHRONIC BACK PAIN, UNSPECIFIED BACK LOCATION, UNSPECIFIED BACK PAIN LATERALITY: ICD-10-CM

## 2021-10-03 DIAGNOSIS — G89.29 CHRONIC BACK PAIN, UNSPECIFIED BACK LOCATION, UNSPECIFIED BACK PAIN LATERALITY: ICD-10-CM

## 2021-10-04 RX ORDER — TRAMADOL HYDROCHLORIDE 50 MG/1
TABLET ORAL
Qty: 90 TABLET | Refills: 0 | Status: SHIPPED | OUTPATIENT
Start: 2021-10-04 | End: 2021-10-27 | Stop reason: SDUPTHER

## 2021-10-25 RX ORDER — SPIRONOLACTONE 25 MG/1
TABLET ORAL
Qty: 39 TABLET | Refills: 0 | OUTPATIENT
Start: 2021-10-25

## 2021-10-25 RX ORDER — BUSPIRONE HYDROCHLORIDE 5 MG/1
TABLET ORAL
Qty: 270 TABLET | Refills: 0 | OUTPATIENT
Start: 2021-10-25

## 2021-10-27 DIAGNOSIS — M54.9 CHRONIC BACK PAIN, UNSPECIFIED BACK LOCATION, UNSPECIFIED BACK PAIN LATERALITY: ICD-10-CM

## 2021-10-27 DIAGNOSIS — G89.29 CHRONIC BACK PAIN, UNSPECIFIED BACK LOCATION, UNSPECIFIED BACK PAIN LATERALITY: ICD-10-CM

## 2021-10-27 RX ORDER — TRAMADOL HYDROCHLORIDE 50 MG/1
50 TABLET ORAL EVERY 6 HOURS PRN
Qty: 90 TABLET | Refills: 0 | Status: SHIPPED | OUTPATIENT
Start: 2021-10-27 | End: 2021-12-01

## 2021-10-27 RX ORDER — BUSPIRONE HYDROCHLORIDE 5 MG/1
5 TABLET ORAL 3 TIMES DAILY
Qty: 90 TABLET | Refills: 1 | Status: SHIPPED | OUTPATIENT
Start: 2021-10-27 | End: 2021-11-17 | Stop reason: SDUPTHER

## 2021-10-27 NOTE — TELEPHONE ENCOUNTER
Caller: Marlo Barney    Relationship: Self      Medication requested (name and dosage):    SPIRONOLACTONE 25MG  MONDAY-WEDNESDAY- FRIDAY   busPIRone (BUSPAR) 5 MG tablet  TWICE DAILY 8AM AND 8PM      Pharmacy where request should be sent: [facility], [place] - [address] [facility] [phone]  - [phone]   [phone]    Additional details provided by patient: THE PATIENT STATED SHE NEEDS THESE MEDICATIONS SENT TO HER [facility] BUT SHE WOULD LIKE PCP TO CHANGE HER BUSPIRONE PRESCRIPTION TWO TWICE DAILY INSTEAD OF THREE TIMES DAILY.     Best call back number: [phone]    Does the patient have less than a 3 day supply:  [] Yes  [x] No    Princess Main Rep   10/27/21 10:45 EDT

## 2021-10-27 NOTE — TELEPHONE ENCOUNTER
Caller: Marlo Barney    Relationship: Self      Medication requested (name and dosage): traMADol (ULTRAM) 50 MG tablet      Pharmacy where request should be sent: [facility]/PHARMACY #[id number]- [place], [place] - [address] [facility] [facility] [phone] [facility] [phone] FX    Additional details provided by patient: THE PATIENT STATED SHE NEEDS A PRESCRIPTION FOR TRAMADOL ALSO SENT TO [facility]    Best call back number: [phone]    Does the patient have less than a 3 day supply:  [] Yes  [x] No    Princess Main Rep   10/27/21 10:52 EDT

## 2021-11-11 ENCOUNTER — TELEPHONE (OUTPATIENT)
Dept: FAMILY MEDICINE CLINIC | Facility: CLINIC | Age: 56
End: 2021-11-11

## 2021-11-11 RX ORDER — BENAZEPRIL HYDROCHLORIDE 40 MG/1
40 TABLET, FILM COATED ORAL DAILY
Qty: 90 TABLET | Refills: 0 | Status: SHIPPED | OUTPATIENT
Start: 2021-11-11 | End: 2021-11-17 | Stop reason: SDUPTHER

## 2021-11-11 RX ORDER — SPIRONOLACTONE 25 MG/1
TABLET ORAL
COMMUNITY
Start: 2021-05-20 | End: 2021-11-11 | Stop reason: SDUPTHER

## 2021-11-11 RX ORDER — SPIRONOLACTONE 25 MG/1
25 TABLET ORAL DAILY
Qty: 90 TABLET | Refills: 1 | Status: SHIPPED | OUTPATIENT
Start: 2021-11-11 | End: 2021-11-17 | Stop reason: SDUPTHER

## 2021-11-11 NOTE — TELEPHONE ENCOUNTER
Caller: Marlo Barney Grissom    Relationship: Self    Best call back number: [phone]    What was the call regarding: PATIENT WAS SUPPOSED TO REFILL BY THE 16TH FOR THESE MEDICATIONS:     benazepril (LOTENSIN) 40 MG tablet  SPIRONOLACTONE     Do you require a callback: YES, PLEASE CALL BACK ASAP.

## 2021-11-16 NOTE — TELEPHONE ENCOUNTER
Caller: Ector Tamiesalome Grissom    Relationship: Self    Best call back number:[phone]    What is the best time to reach you: ANY     Who are you requesting to speak with (clinical staff, provider,  specific staff member): CLINICAL     What was the call regarding: PATIENT IS NEEDING PRESCRIPTIONS RESENT TO [facility] [address] - [phone]  - [phone] FX

## 2021-11-17 RX ORDER — BENAZEPRIL HYDROCHLORIDE 40 MG/1
40 TABLET, FILM COATED ORAL DAILY
Qty: 90 TABLET | Refills: 1 | Status: SHIPPED | OUTPATIENT
Start: 2021-11-17 | End: 2022-06-27

## 2021-11-17 RX ORDER — BUSPIRONE HYDROCHLORIDE 5 MG/1
5 TABLET ORAL 3 TIMES DAILY
Qty: 270 TABLET | Refills: 1 | Status: SHIPPED | OUTPATIENT
Start: 2021-11-17 | End: 2022-04-25

## 2021-11-17 RX ORDER — SPIRONOLACTONE 25 MG/1
25 TABLET ORAL DAILY
Qty: 90 TABLET | Refills: 1 | Status: SHIPPED | OUTPATIENT
Start: 2021-11-17 | End: 2022-11-30 | Stop reason: SDUPTHER

## 2021-12-01 DIAGNOSIS — M54.9 CHRONIC BACK PAIN, UNSPECIFIED BACK LOCATION, UNSPECIFIED BACK PAIN LATERALITY: ICD-10-CM

## 2021-12-01 DIAGNOSIS — G89.29 CHRONIC BACK PAIN, UNSPECIFIED BACK LOCATION, UNSPECIFIED BACK PAIN LATERALITY: ICD-10-CM

## 2021-12-06 ENCOUNTER — TELEPHONE (OUTPATIENT)
Dept: FAMILY MEDICINE CLINIC | Facility: CLINIC | Age: 56
End: 2021-12-06

## 2021-12-06 RX ORDER — TRAMADOL HYDROCHLORIDE 50 MG/1
TABLET ORAL
Qty: 90 TABLET | Refills: 0 | Status: SHIPPED | OUTPATIENT
Start: 2021-12-06 | End: 2022-01-14

## 2021-12-06 NOTE — TELEPHONE ENCOUNTER
Patient called requesting tramadol  Informed patient it looked like it was sent today @2:37 she is calling pharmacy now to check and see if they got it.

## 2021-12-27 RX ORDER — ATORVASTATIN CALCIUM 10 MG/1
TABLET, FILM COATED ORAL
Qty: 90 TABLET | Refills: 0 | Status: SHIPPED | OUTPATIENT
Start: 2021-12-27 | End: 2022-03-28

## 2021-12-27 RX ORDER — AMLODIPINE BESYLATE 10 MG/1
TABLET ORAL
Qty: 90 TABLET | Refills: 0 | Status: SHIPPED | OUTPATIENT
Start: 2021-12-27 | End: 2022-03-28

## 2021-12-27 RX ORDER — FAMOTIDINE 40 MG/1
TABLET, FILM COATED ORAL
Qty: 90 TABLET | Refills: 0 | Status: SHIPPED | OUTPATIENT
Start: 2021-12-27 | End: 2022-03-28

## 2021-12-27 RX ORDER — MELOXICAM 7.5 MG/1
TABLET ORAL
Qty: 90 TABLET | Refills: 0 | Status: SHIPPED | OUTPATIENT
Start: 2021-12-27 | End: 2022-02-10 | Stop reason: SDUPTHER

## 2021-12-27 RX ORDER — OMEPRAZOLE 40 MG/1
CAPSULE, DELAYED RELEASE ORAL
Qty: 90 CAPSULE | Refills: 0 | Status: SHIPPED | OUTPATIENT
Start: 2021-12-27 | End: 2022-03-28

## 2021-12-28 ENCOUNTER — TELEPHONE (OUTPATIENT)
Dept: FAMILY MEDICINE CLINIC | Facility: CLINIC | Age: 56
End: 2021-12-28

## 2021-12-28 DIAGNOSIS — E78.2 MIXED HYPERLIPIDEMIA: ICD-10-CM

## 2021-12-28 DIAGNOSIS — E11.65 TYPE 2 DIABETES MELLITUS WITH HYPERGLYCEMIA, WITHOUT LONG-TERM CURRENT USE OF INSULIN (HCC): ICD-10-CM

## 2021-12-28 DIAGNOSIS — I10 ESSENTIAL HYPERTENSION: Primary | ICD-10-CM

## 2021-12-28 PROBLEM — E11.9 DIABETES MELLITUS, TYPE 2: Status: ACTIVE | Noted: 2019-12-09

## 2021-12-28 PROBLEM — E78.5 HYPERLIPIDEMIA: Status: ACTIVE | Noted: 2021-12-28

## 2021-12-28 NOTE — TELEPHONE ENCOUNTER
Patient has an upcoming appointment 2/10 for a physical and is needing to know if she will need to complete labs.

## 2022-01-09 DIAGNOSIS — M54.9 CHRONIC BACK PAIN, UNSPECIFIED BACK LOCATION, UNSPECIFIED BACK PAIN LATERALITY: ICD-10-CM

## 2022-01-09 DIAGNOSIS — G89.29 CHRONIC BACK PAIN, UNSPECIFIED BACK LOCATION, UNSPECIFIED BACK PAIN LATERALITY: ICD-10-CM

## 2022-01-13 ENCOUNTER — CLINICAL SUPPORT (OUTPATIENT)
Dept: FAMILY MEDICINE CLINIC | Facility: CLINIC | Age: 57
End: 2022-01-13

## 2022-01-13 DIAGNOSIS — Z79.899 ENCOUNTER FOR MEDICATION MANAGEMENT: Primary | ICD-10-CM

## 2022-01-13 PROCEDURE — 80305 DRUG TEST PRSMV DIR OPT OBS: CPT | Performed by: NURSE PRACTITIONER

## 2022-01-14 RX ORDER — TRAMADOL HYDROCHLORIDE 50 MG/1
TABLET ORAL
Qty: 90 TABLET | Refills: 0 | Status: SHIPPED | OUTPATIENT
Start: 2022-01-14 | End: 2022-02-10

## 2022-02-10 ENCOUNTER — OFFICE VISIT (OUTPATIENT)
Dept: FAMILY MEDICINE CLINIC | Facility: CLINIC | Age: 57
End: 2022-02-10

## 2022-02-10 ENCOUNTER — LAB (OUTPATIENT)
Dept: LAB | Facility: HOSPITAL | Age: 57
End: 2022-02-10

## 2022-02-10 VITALS
OXYGEN SATURATION: 99 % | WEIGHT: 281.4 LBS | HEIGHT: 66 IN | BODY MASS INDEX: 45.22 KG/M2 | TEMPERATURE: 97.1 F | RESPIRATION RATE: 16 BRPM | SYSTOLIC BLOOD PRESSURE: 152 MMHG | HEART RATE: 100 BPM | DIASTOLIC BLOOD PRESSURE: 86 MMHG

## 2022-02-10 DIAGNOSIS — Z11.59 NEED FOR HEPATITIS C SCREENING TEST: ICD-10-CM

## 2022-02-10 DIAGNOSIS — E78.2 MIXED HYPERLIPIDEMIA: ICD-10-CM

## 2022-02-10 DIAGNOSIS — Z00.00 ANNUAL PHYSICAL EXAM: Primary | ICD-10-CM

## 2022-02-10 DIAGNOSIS — I10 ESSENTIAL HYPERTENSION: ICD-10-CM

## 2022-02-10 DIAGNOSIS — E11.65 TYPE 2 DIABETES MELLITUS WITH HYPERGLYCEMIA, WITHOUT LONG-TERM CURRENT USE OF INSULIN: ICD-10-CM

## 2022-02-10 DIAGNOSIS — D64.9 ANEMIA, UNSPECIFIED TYPE: ICD-10-CM

## 2022-02-10 DIAGNOSIS — M25.511 ACUTE PAIN OF BOTH SHOULDERS: ICD-10-CM

## 2022-02-10 DIAGNOSIS — M54.9 CHRONIC BACK PAIN, UNSPECIFIED BACK LOCATION, UNSPECIFIED BACK PAIN LATERALITY: ICD-10-CM

## 2022-02-10 DIAGNOSIS — M25.512 ACUTE PAIN OF BOTH SHOULDERS: ICD-10-CM

## 2022-02-10 DIAGNOSIS — M54.50 CHRONIC LOW BACK PAIN, UNSPECIFIED BACK PAIN LATERALITY, UNSPECIFIED WHETHER SCIATICA PRESENT: ICD-10-CM

## 2022-02-10 DIAGNOSIS — F41.9 ANXIETY: ICD-10-CM

## 2022-02-10 DIAGNOSIS — K21.9 GASTROESOPHAGEAL REFLUX DISEASE WITHOUT ESOPHAGITIS: ICD-10-CM

## 2022-02-10 DIAGNOSIS — G89.29 CHRONIC BACK PAIN, UNSPECIFIED BACK LOCATION, UNSPECIFIED BACK PAIN LATERALITY: ICD-10-CM

## 2022-02-10 DIAGNOSIS — G89.29 CHRONIC LOW BACK PAIN, UNSPECIFIED BACK PAIN LATERALITY, UNSPECIFIED WHETHER SCIATICA PRESENT: ICD-10-CM

## 2022-02-10 PROBLEM — R51.9 HEADACHE: Status: ACTIVE | Noted: 2019-12-09

## 2022-02-10 PROBLEM — J45.909 ASTHMA: Status: ACTIVE | Noted: 2022-02-10

## 2022-02-10 PROBLEM — R69 DISORDER: Status: ACTIVE | Noted: 2022-02-10

## 2022-02-10 PROBLEM — K57.92 DIVERTICULITIS: Status: ACTIVE | Noted: 2022-02-10

## 2022-02-10 LAB
ALBUMIN SERPL-MCNC: 4.8 G/DL (ref 3.5–5.2)
ALBUMIN UR-MCNC: <1.2 MG/DL
ALBUMIN/GLOB SERPL: 2.5 G/DL
ALP SERPL-CCNC: 57 U/L (ref 39–117)
ALT SERPL W P-5'-P-CCNC: 18 U/L (ref 1–33)
ANION GAP SERPL CALCULATED.3IONS-SCNC: 11.9 MMOL/L (ref 5–15)
AST SERPL-CCNC: 16 U/L (ref 1–32)
BACTERIA UR QL AUTO: ABNORMAL /HPF
BASOPHILS # BLD AUTO: 0.04 10*3/MM3 (ref 0–0.2)
BASOPHILS NFR BLD AUTO: 0.6 % (ref 0–1.5)
BILIRUB SERPL-MCNC: 0.3 MG/DL (ref 0–1.2)
BILIRUB UR QL STRIP: NEGATIVE
BUN SERPL-MCNC: 11 MG/DL (ref 6–20)
BUN/CREAT SERPL: 15.7 (ref 7–25)
CALCIUM SPEC-SCNC: 9 MG/DL (ref 8.6–10.5)
CHLORIDE SERPL-SCNC: 99 MMOL/L (ref 98–107)
CHOLEST SERPL-MCNC: 123 MG/DL (ref 0–200)
CLARITY UR: ABNORMAL
CO2 SERPL-SCNC: 28.1 MMOL/L (ref 22–29)
COLOR UR: YELLOW
CREAT SERPL-MCNC: 0.7 MG/DL (ref 0.57–1)
DEPRECATED RDW RBC AUTO: 39.1 FL (ref 37–54)
EOSINOPHIL # BLD AUTO: 0.08 10*3/MM3 (ref 0–0.4)
EOSINOPHIL NFR BLD AUTO: 1.2 % (ref 0.3–6.2)
ERYTHROCYTE [DISTWIDTH] IN BLOOD BY AUTOMATED COUNT: 12.2 % (ref 12.3–15.4)
GFR SERPL CREATININE-BSD FRML MDRD: 87 ML/MIN/1.73
GLOBULIN UR ELPH-MCNC: 1.9 GM/DL
GLUCOSE SERPL-MCNC: 204 MG/DL (ref 65–99)
GLUCOSE UR STRIP-MCNC: NEGATIVE MG/DL
HBA1C MFR BLD: 8.1 % (ref 4.8–5.6)
HCT VFR BLD AUTO: 38.8 % (ref 34–46.6)
HDLC SERPL QL: 4.24
HDLC SERPL-MCNC: 29 MG/DL (ref 40–60)
HGB BLD-MCNC: 12.7 G/DL (ref 12–15.9)
HGB UR QL STRIP.AUTO: NEGATIVE
HYALINE CASTS UR QL AUTO: ABNORMAL /LPF
IMM GRANULOCYTES # BLD AUTO: 0.02 10*3/MM3 (ref 0–0.05)
IMM GRANULOCYTES NFR BLD AUTO: 0.3 % (ref 0–0.5)
KETONES UR QL STRIP: NEGATIVE
LDLC SERPL CALC-MCNC: 64 MG/DL (ref 0–100)
LEUKOCYTE ESTERASE UR QL STRIP.AUTO: ABNORMAL
LYMPHOCYTES # BLD AUTO: 2.14 10*3/MM3 (ref 0.7–3.1)
LYMPHOCYTES NFR BLD AUTO: 33 % (ref 19.6–45.3)
MCH RBC QN AUTO: 29 PG (ref 26.6–33)
MCHC RBC AUTO-ENTMCNC: 32.7 G/DL (ref 31.5–35.7)
MCV RBC AUTO: 88.6 FL (ref 79–97)
MONOCYTES # BLD AUTO: 0.34 10*3/MM3 (ref 0.1–0.9)
MONOCYTES NFR BLD AUTO: 5.2 % (ref 5–12)
NEUTROPHILS NFR BLD AUTO: 3.87 10*3/MM3 (ref 1.7–7)
NEUTROPHILS NFR BLD AUTO: 59.7 % (ref 42.7–76)
NITRITE UR QL STRIP: NEGATIVE
NRBC BLD AUTO-RTO: 0 /100 WBC (ref 0–0.2)
PH UR STRIP.AUTO: 6 [PH] (ref 5–8)
PLATELET # BLD AUTO: 311 10*3/MM3 (ref 140–450)
PMV BLD AUTO: 10.8 FL (ref 6–12)
POTASSIUM SERPL-SCNC: 3.7 MMOL/L (ref 3.5–5.2)
PROT SERPL-MCNC: 6.7 G/DL (ref 6–8.5)
PROT UR QL STRIP: NEGATIVE
RBC # BLD AUTO: 4.38 10*6/MM3 (ref 3.77–5.28)
RBC # UR STRIP: ABNORMAL /HPF
REF LAB TEST METHOD: ABNORMAL
SODIUM SERPL-SCNC: 139 MMOL/L (ref 136–145)
SP GR UR STRIP: 1.02 (ref 1–1.03)
SQUAMOUS #/AREA URNS HPF: ABNORMAL /HPF
TRIGL SERPL-MCNC: 179 MG/DL (ref 0–150)
UROBILINOGEN UR QL STRIP: ABNORMAL
VLDLC SERPL-MCNC: 30 MG/DL (ref 5–40)
WBC # UR STRIP: ABNORMAL /HPF
WBC NRBC COR # BLD: 6.49 10*3/MM3 (ref 3.4–10.8)

## 2022-02-10 PROCEDURE — 82043 UR ALBUMIN QUANTITATIVE: CPT

## 2022-02-10 PROCEDURE — 85025 COMPLETE CBC W/AUTO DIFF WBC: CPT

## 2022-02-10 PROCEDURE — 81001 URINALYSIS AUTO W/SCOPE: CPT

## 2022-02-10 PROCEDURE — 80061 LIPID PANEL: CPT

## 2022-02-10 PROCEDURE — 99396 PREV VISIT EST AGE 40-64: CPT | Performed by: NURSE PRACTITIONER

## 2022-02-10 PROCEDURE — 36415 COLL VENOUS BLD VENIPUNCTURE: CPT

## 2022-02-10 PROCEDURE — 83036 HEMOGLOBIN GLYCOSYLATED A1C: CPT

## 2022-02-10 PROCEDURE — 99214 OFFICE O/P EST MOD 30 MIN: CPT | Performed by: NURSE PRACTITIONER

## 2022-02-10 PROCEDURE — 80053 COMPREHEN METABOLIC PANEL: CPT

## 2022-02-10 RX ORDER — SENNOSIDES 8.6 MG
CAPSULE ORAL
COMMUNITY

## 2022-02-10 RX ORDER — TRAMADOL HYDROCHLORIDE 50 MG/1
TABLET ORAL
Qty: 90 TABLET | Refills: 0 | Status: SHIPPED | OUTPATIENT
Start: 2022-02-10 | End: 2022-03-28

## 2022-02-10 RX ORDER — MELOXICAM 15 MG/1
7.5 TABLET ORAL DAILY
Qty: 90 TABLET | Refills: 1 | Status: SHIPPED | OUTPATIENT
Start: 2022-02-10 | End: 2022-02-10

## 2022-02-10 RX ORDER — MELOXICAM 15 MG/1
7.5 TABLET ORAL DAILY
Qty: 90 TABLET | Refills: 1 | Status: SHIPPED | OUTPATIENT
Start: 2022-02-10 | End: 2022-02-14 | Stop reason: SDUPTHER

## 2022-02-10 RX ORDER — METFORMIN HYDROCHLORIDE 500 MG/1
TABLET, EXTENDED RELEASE ORAL
COMMUNITY
Start: 2021-04-28 | End: 2022-02-11

## 2022-02-10 RX ORDER — ASPIRIN 81 MG/1
TABLET ORAL
COMMUNITY

## 2022-02-10 NOTE — PROGRESS NOTES
Chief Complaint  Annual Exam, Anxiety (f/u), Depression (f/u), Hypertension (f/u), Shoulder Pain (bilateral), and Diabetes (discuss medication)    Subjective        Marlo Barney presents to [facility]  GERD:  Doing well on medication    Lumbago:  Doing well and needs refill of Tramadol.    Diabetes:  Doing well on diet.  Has been on for years but would like to try something else.  However, has a high deductible so newer medicines are out of financial range.  Kidney function has been good and discussed    Annual exam:  Has lost 20 pounds in the last 2 years.  Watching  Diet.  Hypertension:  Doing well but elevated today.  Will recheck  Before leaves office    Hyperlipidemia:  Doing well on medication. States has been having some leg cramps but not as bad through the night lately.    Shoulders bilateral having pain when raising shoulders over head.  Also having pain when reaches behind her.  Denies any known trauma or causative issues.          Past History:    Medical History: has a past medical history of Anxiety, Arthritis, Depression, Diabetes mellitus (HCC), Diverticulitis, GERD (gastroesophageal reflux disease), Headache, Hyperlipidemia, and Hypertension.     Surgical History: has a past surgical history that includes Tonsillectomy; Gastric bypass; Endometrial ablation; and Hernia repair.     Family History: family history includes COPD in her father; Cerebral aneurysm in her paternal grandfather; Diabetes in her maternal aunt, maternal grandmother, mother, and paternal aunt; Heart disease in her father, maternal grandmother, mother, and paternal grandfather; Hyperlipidemia in her mother; Hypertension in her mother; Leukemia in her maternal grandfather; Myelodysplastic syndrome in her paternal grandmother; Stroke in her maternal grandfather; Thyroid disease in her paternal grandmother.     Social History: reports that she has quit smoking. Her smoking use included  cigarettes. She has never used smokeless tobacco. She reports current alcohol use. She reports that she does not use drugs.    Allergies: Patient has no known allergies.          Current Outpatient Medications:   •  acetaminophen (Tylenol 8 Hour) 650 MG 8 hr tablet, Tylenol Arthritis Pain 650 mg oral tablet extended release take 2 tablets by oral route every 8 hours   Active, Disp: , Rfl:   •  amLODIPine (NORVASC) 10 MG tablet, Take 1 tablet by mouth daily., Disp: 90 tablet, Rfl: 0  •  aspirin (aspirin) 81 MG EC tablet, aspirin 81 mg oral tablet,delayed release (DR/EC) take 1 tablet (81 mg) by oral route once daily   Active, Disp: , Rfl:   •  atorvastatin (LIPITOR) 10 MG tablet, Take 1 tablet by mouth daily at bedtime., Disp: 90 tablet, Rfl: 0  •  benazepril (LOTENSIN) 40 MG tablet, Take 1 tablet by mouth Daily., Disp: 90 tablet, Rfl: 1  •  busPIRone (BUSPAR) 5 MG tablet, Take 1 tablet by mouth 3 (Three) Times a Day. (Patient taking differently: Take 5 mg by mouth 2 (Two) Times a Day As Needed.), Disp: 270 tablet, Rfl: 1  •  carvedilol (COREG) 12.5 MG tablet, Take 1 tablet by mouth twice daily with food., Disp: 180 tablet, Rfl: 0  •  Cholecalciferol 50 MCG (2000 UT) capsule, Vitamin D3 50 mcg (2,000 unit) oral capsule take 1 capsule by oral route daily   Active, Disp: , Rfl:   •  famotidine (PEPCID) 40 MG tablet, Take 1 tablet by mouth daily at bedtime., Disp: 90 tablet, Rfl: 0  •  meloxicam (MOBIC) 15 MG tablet, Take 0.5 tablets by mouth Daily., Disp: 90 tablet, Rfl: 1  •  metFORMIN ER (GLUCOPHAGE-XR) 500 MG 24 hr tablet, metformin 500 mg oral tablet extended release 24 hr take 2 tablets (1,000 mg) by oral route 2 times per day with meals for 90 days 4/28/2021  Active cancel 1000mg ER tablets, Disp: , Rfl:   •  omeprazole (priLOSEC) 40 MG capsule, Take 1 capsule by mouth daily before a meal., Disp: 90 capsule, Rfl: 0  •  spironolactone (ALDACTONE) 25 MG tablet, Take 1 tablet by mouth Daily. M,W,F, Disp: 90 tablet,  "Rfl: 1  •  traMADol (ULTRAM) 50 MG tablet, TAKE 1 TABLET BY MOUTH EVERY 6 HOURS AS NEEDED FOR PAIN (MODERATE PAIN), Disp: 90 tablet, Rfl: 0    Medications Discontinued During This Encounter   Medication Reason   • meloxicam (MOBIC) 7.5 MG tablet Reorder         Review of Systems   Constitutional: Negative for fever.   Respiratory: Negative for cough and shortness of breath.    Cardiovascular: Negative for chest pain, palpitations and leg swelling.   Neurological: Negative for dizziness, weakness, numbness and headache.        Objective         Vitals:    02/10/22 0706 02/10/22 0748   BP: 152/88 152/86   BP Location: Right arm    Patient Position: Sitting    Cuff Size: Large Adult    Pulse: 100    Resp: 16    Temp: 97.1 °F (36.2 °C)    TempSrc: Infrared    SpO2: 99%    Weight: 128 kg (281 lb 6.4 oz)    Height: 167.6 cm (66\")      Body mass index is 45.42 kg/m².         Physical Exam  Vitals reviewed.   Constitutional:       Appearance: Normal appearance. She is well-developed.   HENT:      Head: Normocephalic and atraumatic.      Mouth/Throat:      Pharynx: No oropharyngeal exudate.   Eyes:      Conjunctiva/sclera: Conjunctivae normal.      Pupils: Pupils are equal, round, and reactive to light.   Cardiovascular:      Rate and Rhythm: Normal rate and regular rhythm.      Heart sounds: No murmur heard.  No friction rub. No gallop.    Pulmonary:      Effort: Pulmonary effort is normal.      Breath sounds: Normal breath sounds. No wheezing or rhonchi.   Abdominal:      General: Bowel sounds are normal.      Palpations: Abdomen is soft.      Tenderness: There is no abdominal tenderness.   Musculoskeletal:         General: Tenderness present.        Arms:       Comments: Areas of palpable tenderness     Skin:     General: Skin is warm and dry.   Neurological:      Mental Status: She is alert and oriented to person, place, and time.   Psychiatric:         Mood and Affect: Mood and affect normal.         Behavior: Behavior " normal.         Thought Content: Thought content normal.         Judgment: Judgment normal.             Result Review :               Assessment and Plan     Diagnoses and all orders for this visit:    1. Annual physical exam (Primary)  Comments:  discussed diet and exercise.    2. Need for hepatitis C screening test  -     Hepatitis C antibody; Future    3. Acute pain of both shoulders  -     Cancel: XR Shoulder 2+ View Left; Future  -     Cancel: XR Shoulder 2+ View Right; Future  -     XR Shoulder 2+ View Left; Future  -     XR Shoulder 2+ View Right; Future    4. Anemia, unspecified type  Comments:  continue buspar as needed    5. Anxiety    6. Mixed hyperlipidemia    7. Essential hypertension  Comments:  Will journal blood pressures and let us know if staying above 140/90 will increase medication.    8. Type 2 diabetes mellitus with hyperglycemia, without long-term current use of insulin (HCC)    9. Gastroesophageal reflux disease without esophagitis  Comments:  Continue omeprazole at current dose.    10. Chronic low back pain, unspecified back pain laterality, unspecified whether sciatica present  Comments:  continue tramadol at current dose.  Orders:  -     meloxicam (MOBIC) 15 MG tablet; Take 0.5 tablets by mouth Daily.  Dispense: 90 tablet; Refill: 1              Follow Up     Return in about 6 months (around 8/10/2022).    Patient was given instructions and counseling regarding her condition or for health maintenance advice. Please see specific information pulled into the AVS if appropriate.

## 2022-02-11 DIAGNOSIS — E11.65 TYPE 2 DIABETES MELLITUS WITH HYPERGLYCEMIA, WITHOUT LONG-TERM CURRENT USE OF INSULIN: Primary | ICD-10-CM

## 2022-02-11 RX ORDER — METFORMIN HYDROCHLORIDE 500 MG/1
1000 TABLET, EXTENDED RELEASE ORAL
Qty: 180 TABLET | Refills: 1 | Status: SHIPPED | OUTPATIENT
Start: 2022-02-11 | End: 2023-01-05 | Stop reason: SDUPTHER

## 2022-02-12 ENCOUNTER — PATIENT MESSAGE (OUTPATIENT)
Dept: FAMILY MEDICINE CLINIC | Facility: CLINIC | Age: 57
End: 2022-02-12

## 2022-02-12 DIAGNOSIS — G89.29 CHRONIC LOW BACK PAIN, UNSPECIFIED BACK PAIN LATERALITY, UNSPECIFIED WHETHER SCIATICA PRESENT: ICD-10-CM

## 2022-02-12 DIAGNOSIS — M54.50 CHRONIC LOW BACK PAIN, UNSPECIFIED BACK PAIN LATERALITY, UNSPECIFIED WHETHER SCIATICA PRESENT: ICD-10-CM

## 2022-02-14 RX ORDER — MELOXICAM 15 MG/1
15 TABLET ORAL DAILY
Qty: 90 TABLET | Refills: 1 | Status: SHIPPED | OUTPATIENT
Start: 2022-02-14 | End: 2023-01-05 | Stop reason: SDUPTHER

## 2022-02-14 NOTE — TELEPHONE ENCOUNTER
From: Marlo Barney  To: HARLEY Dixon  Sent: 2/12/2022 6:42 AM EST  Subject: Med changes    Questions about the med changes:  1 What dosage is the Metformine that is twice a day?  2 We talked about increasing Melaxicam to 15 m twice a day but notes say half a tablet. Isn't that the same dose as the 7.5? I am confused. Please clairify.    Thank You for your time.

## 2022-03-28 DIAGNOSIS — G89.29 CHRONIC BACK PAIN, UNSPECIFIED BACK LOCATION, UNSPECIFIED BACK PAIN LATERALITY: ICD-10-CM

## 2022-03-28 DIAGNOSIS — M54.9 CHRONIC BACK PAIN, UNSPECIFIED BACK LOCATION, UNSPECIFIED BACK PAIN LATERALITY: ICD-10-CM

## 2022-03-28 RX ORDER — TRAMADOL HYDROCHLORIDE 50 MG/1
TABLET ORAL
Qty: 90 TABLET | Refills: 0 | Status: SHIPPED | OUTPATIENT
Start: 2022-03-28 | End: 2022-05-02

## 2022-03-28 RX ORDER — FAMOTIDINE 40 MG/1
TABLET, FILM COATED ORAL
Qty: 90 TABLET | Refills: 0 | Status: SHIPPED | OUTPATIENT
Start: 2022-03-28 | End: 2022-06-27

## 2022-03-28 RX ORDER — CARVEDILOL 12.5 MG/1
TABLET ORAL
Qty: 180 TABLET | Refills: 0 | Status: SHIPPED | OUTPATIENT
Start: 2022-03-28 | End: 2022-06-27

## 2022-03-28 RX ORDER — ATORVASTATIN CALCIUM 10 MG/1
TABLET, FILM COATED ORAL
Qty: 90 TABLET | Refills: 0 | Status: SHIPPED | OUTPATIENT
Start: 2022-03-28 | End: 2022-06-27

## 2022-03-28 RX ORDER — OMEPRAZOLE 40 MG/1
CAPSULE, DELAYED RELEASE ORAL
Qty: 90 CAPSULE | Refills: 0 | Status: SHIPPED | OUTPATIENT
Start: 2022-03-28 | End: 2022-06-27

## 2022-03-28 RX ORDER — AMLODIPINE BESYLATE 10 MG/1
TABLET ORAL
Qty: 90 TABLET | Refills: 0 | Status: SHIPPED | OUTPATIENT
Start: 2022-03-28 | End: 2022-06-27

## 2022-04-25 RX ORDER — BUSPIRONE HYDROCHLORIDE 5 MG/1
TABLET ORAL
Qty: 270 TABLET | Refills: 0 | Status: SHIPPED | OUTPATIENT
Start: 2022-04-25 | End: 2022-07-27

## 2022-05-01 DIAGNOSIS — G89.29 CHRONIC BACK PAIN, UNSPECIFIED BACK LOCATION, UNSPECIFIED BACK PAIN LATERALITY: ICD-10-CM

## 2022-05-01 DIAGNOSIS — M54.9 CHRONIC BACK PAIN, UNSPECIFIED BACK LOCATION, UNSPECIFIED BACK PAIN LATERALITY: ICD-10-CM

## 2022-05-02 RX ORDER — TRAMADOL HYDROCHLORIDE 50 MG/1
TABLET ORAL
Qty: 90 TABLET | Refills: 0 | Status: SHIPPED | OUTPATIENT
Start: 2022-05-02 | End: 2022-06-01

## 2022-05-31 DIAGNOSIS — G89.29 CHRONIC BACK PAIN, UNSPECIFIED BACK LOCATION, UNSPECIFIED BACK PAIN LATERALITY: ICD-10-CM

## 2022-05-31 DIAGNOSIS — M54.9 CHRONIC BACK PAIN, UNSPECIFIED BACK LOCATION, UNSPECIFIED BACK PAIN LATERALITY: ICD-10-CM

## 2022-06-01 RX ORDER — TRAMADOL HYDROCHLORIDE 50 MG/1
TABLET ORAL
Qty: 90 TABLET | Refills: 0 | Status: SHIPPED | OUTPATIENT
Start: 2022-06-01 | End: 2022-06-29

## 2022-06-27 RX ORDER — CARVEDILOL 12.5 MG/1
TABLET ORAL
Qty: 180 TABLET | Refills: 0 | Status: SHIPPED | OUTPATIENT
Start: 2022-06-27 | End: 2022-09-22

## 2022-06-27 RX ORDER — FAMOTIDINE 40 MG/1
TABLET, FILM COATED ORAL
Qty: 90 TABLET | Refills: 0 | Status: SHIPPED | OUTPATIENT
Start: 2022-06-27 | End: 2022-09-22

## 2022-06-27 RX ORDER — AMLODIPINE BESYLATE 10 MG/1
TABLET ORAL
Qty: 90 TABLET | Refills: 0 | Status: SHIPPED | OUTPATIENT
Start: 2022-06-27 | End: 2022-09-22

## 2022-06-27 RX ORDER — BENAZEPRIL HYDROCHLORIDE 40 MG/1
40 TABLET, FILM COATED ORAL DAILY
Qty: 90 TABLET | Refills: 0 | Status: SHIPPED | OUTPATIENT
Start: 2022-06-27 | End: 2022-09-22

## 2022-06-27 RX ORDER — OMEPRAZOLE 40 MG/1
CAPSULE, DELAYED RELEASE ORAL
Qty: 90 CAPSULE | Refills: 0 | Status: SHIPPED | OUTPATIENT
Start: 2022-06-27 | End: 2022-09-22

## 2022-06-27 RX ORDER — ATORVASTATIN CALCIUM 10 MG/1
TABLET, FILM COATED ORAL
Qty: 90 TABLET | Refills: 0 | Status: SHIPPED | OUTPATIENT
Start: 2022-06-27 | End: 2022-09-22

## 2022-06-29 DIAGNOSIS — G89.29 CHRONIC BACK PAIN, UNSPECIFIED BACK LOCATION, UNSPECIFIED BACK PAIN LATERALITY: ICD-10-CM

## 2022-06-29 DIAGNOSIS — M54.9 CHRONIC BACK PAIN, UNSPECIFIED BACK LOCATION, UNSPECIFIED BACK PAIN LATERALITY: ICD-10-CM

## 2022-06-29 RX ORDER — TRAMADOL HYDROCHLORIDE 50 MG/1
TABLET ORAL
Qty: 90 TABLET | Refills: 0 | Status: SHIPPED | OUTPATIENT
Start: 2022-06-29 | End: 2022-08-10

## 2022-07-27 RX ORDER — BUSPIRONE HYDROCHLORIDE 5 MG/1
TABLET ORAL
Qty: 270 TABLET | Refills: 0 | Status: SHIPPED | OUTPATIENT
Start: 2022-07-27 | End: 2022-11-30 | Stop reason: SDUPTHER

## 2022-08-09 DIAGNOSIS — G89.29 CHRONIC BACK PAIN, UNSPECIFIED BACK LOCATION, UNSPECIFIED BACK PAIN LATERALITY: ICD-10-CM

## 2022-08-09 DIAGNOSIS — M54.9 CHRONIC BACK PAIN, UNSPECIFIED BACK LOCATION, UNSPECIFIED BACK PAIN LATERALITY: ICD-10-CM

## 2022-08-10 ENCOUNTER — TELEPHONE (OUTPATIENT)
Dept: FAMILY MEDICINE CLINIC | Facility: CLINIC | Age: 57
End: 2022-08-10

## 2022-08-10 RX ORDER — TRAMADOL HYDROCHLORIDE 50 MG/1
TABLET ORAL
Qty: 90 TABLET | Refills: 0 | Status: SHIPPED | OUTPATIENT
Start: 2022-08-10 | End: 2022-09-23

## 2022-08-10 NOTE — TELEPHONE ENCOUNTER
Lvmtcb   Patient cancelled appointment scheduled for 8/11/22 @1pm. Would patient like to reschedule?

## 2022-09-22 RX ORDER — CARVEDILOL 12.5 MG/1
TABLET ORAL
Qty: 180 TABLET | Refills: 0 | Status: SHIPPED | OUTPATIENT
Start: 2022-09-22 | End: 2023-01-05 | Stop reason: SDUPTHER

## 2022-09-22 RX ORDER — OMEPRAZOLE 40 MG/1
CAPSULE, DELAYED RELEASE ORAL
Qty: 90 CAPSULE | Refills: 0 | Status: SHIPPED | OUTPATIENT
Start: 2022-09-22 | End: 2023-01-05 | Stop reason: SDUPTHER

## 2022-09-22 RX ORDER — AMLODIPINE BESYLATE 10 MG/1
TABLET ORAL
Qty: 90 TABLET | Refills: 0 | Status: SHIPPED | OUTPATIENT
Start: 2022-09-22 | End: 2023-01-05 | Stop reason: SDUPTHER

## 2022-09-22 RX ORDER — BENAZEPRIL HYDROCHLORIDE 40 MG/1
40 TABLET, FILM COATED ORAL DAILY
Qty: 90 TABLET | Refills: 0 | Status: SHIPPED | OUTPATIENT
Start: 2022-09-22 | End: 2023-01-05 | Stop reason: SDUPTHER

## 2022-09-22 RX ORDER — FAMOTIDINE 40 MG/1
TABLET, FILM COATED ORAL
Qty: 90 TABLET | Refills: 0 | Status: SHIPPED | OUTPATIENT
Start: 2022-09-22 | End: 2023-01-05 | Stop reason: SDUPTHER

## 2022-09-22 RX ORDER — ATORVASTATIN CALCIUM 10 MG/1
TABLET, FILM COATED ORAL
Qty: 90 TABLET | Refills: 0 | Status: SHIPPED | OUTPATIENT
Start: 2022-09-22 | End: 2023-01-05 | Stop reason: SDUPTHER

## 2022-09-23 DIAGNOSIS — G89.29 CHRONIC BACK PAIN, UNSPECIFIED BACK LOCATION, UNSPECIFIED BACK PAIN LATERALITY: ICD-10-CM

## 2022-09-23 DIAGNOSIS — M54.9 CHRONIC BACK PAIN, UNSPECIFIED BACK LOCATION, UNSPECIFIED BACK PAIN LATERALITY: ICD-10-CM

## 2022-09-23 RX ORDER — TRAMADOL HYDROCHLORIDE 50 MG/1
TABLET ORAL
Qty: 90 TABLET | Refills: 0 | Status: SHIPPED | OUTPATIENT
Start: 2022-09-23 | End: 2022-10-19

## 2022-10-19 DIAGNOSIS — M54.9 CHRONIC BACK PAIN, UNSPECIFIED BACK LOCATION, UNSPECIFIED BACK PAIN LATERALITY: ICD-10-CM

## 2022-10-19 DIAGNOSIS — G89.29 CHRONIC BACK PAIN, UNSPECIFIED BACK LOCATION, UNSPECIFIED BACK PAIN LATERALITY: ICD-10-CM

## 2022-10-19 RX ORDER — TRAMADOL HYDROCHLORIDE 50 MG/1
TABLET ORAL
Qty: 90 TABLET | Refills: 0 | Status: SHIPPED | OUTPATIENT
Start: 2022-10-19 | End: 2022-11-30 | Stop reason: SDUPTHER

## 2022-10-20 RX ORDER — BUSPIRONE HYDROCHLORIDE 5 MG/1
TABLET ORAL
Qty: 270 TABLET | Refills: 0 | OUTPATIENT
Start: 2022-10-20

## 2022-10-20 RX ORDER — SPIRONOLACTONE 25 MG/1
TABLET ORAL
Qty: 90 TABLET | Refills: 0 | OUTPATIENT
Start: 2022-10-20

## 2022-11-30 ENCOUNTER — TELEPHONE (OUTPATIENT)
Dept: FAMILY MEDICINE CLINIC | Facility: CLINIC | Age: 57
End: 2022-11-30

## 2022-11-30 DIAGNOSIS — F41.9 ANXIETY: Primary | ICD-10-CM

## 2022-11-30 DIAGNOSIS — M54.9 CHRONIC BACK PAIN, UNSPECIFIED BACK LOCATION, UNSPECIFIED BACK PAIN LATERALITY: ICD-10-CM

## 2022-11-30 DIAGNOSIS — G89.29 CHRONIC BACK PAIN, UNSPECIFIED BACK LOCATION, UNSPECIFIED BACK PAIN LATERALITY: ICD-10-CM

## 2022-11-30 RX ORDER — BUSPIRONE HYDROCHLORIDE 5 MG/1
5 TABLET ORAL 3 TIMES DAILY
Qty: 270 TABLET | Refills: 0 | Status: SHIPPED | OUTPATIENT
Start: 2022-11-30 | End: 2023-01-05 | Stop reason: SDUPTHER

## 2022-11-30 RX ORDER — SPIRONOLACTONE 25 MG/1
25 TABLET ORAL DAILY
Qty: 90 TABLET | Refills: 0 | Status: SHIPPED | OUTPATIENT
Start: 2022-11-30 | End: 2023-01-05 | Stop reason: SDUPTHER

## 2022-11-30 RX ORDER — TRAMADOL HYDROCHLORIDE 50 MG/1
50 TABLET ORAL EVERY 6 HOURS PRN
Qty: 90 TABLET | Refills: 0 | Status: SHIPPED | OUTPATIENT
Start: 2022-11-30 | End: 2023-01-25

## 2022-11-30 NOTE — TELEPHONE ENCOUNTER
NOV: 01/05/22   LOV: 02/10/2022  UDS/Consent: 1/13/2022    Pt hasn't been in due to insurance not paying, she gets new insurance in January that is better and is scheduled.

## 2022-12-21 ENCOUNTER — TELEPHONE (OUTPATIENT)
Dept: FAMILY MEDICINE CLINIC | Facility: CLINIC | Age: 57
End: 2022-12-21
Payer: COMMERCIAL

## 2022-12-21 NOTE — TELEPHONE ENCOUNTER
Caller: Marlo Barney Grissom    Relationship: Self    Best call back number: [phone]    What is the best time to reach you: ANY    Who are you requesting to speak with (clinical staff, provider,  specific staff member): ONLY WANTS TO SPEAK TO KAREEM.    What was the call regarding: RESCHEDULING APPOINTMENT ON 01.05.2022    Do you require a callback: YES

## 2023-01-05 ENCOUNTER — OFFICE VISIT (OUTPATIENT)
Dept: FAMILY MEDICINE CLINIC | Facility: CLINIC | Age: 58
End: 2023-01-05
Payer: COMMERCIAL

## 2023-01-05 VITALS
HEART RATE: 74 BPM | RESPIRATION RATE: 16 BRPM | TEMPERATURE: 96.9 F | DIASTOLIC BLOOD PRESSURE: 72 MMHG | BODY MASS INDEX: 42.72 KG/M2 | HEIGHT: 66 IN | OXYGEN SATURATION: 96 % | WEIGHT: 265.8 LBS | SYSTOLIC BLOOD PRESSURE: 123 MMHG

## 2023-01-05 DIAGNOSIS — M25.511 CHRONIC PAIN OF BOTH SHOULDERS: ICD-10-CM

## 2023-01-05 DIAGNOSIS — Z78.0 POST-MENOPAUSAL: ICD-10-CM

## 2023-01-05 DIAGNOSIS — Z12.31 ENCOUNTER FOR SCREENING MAMMOGRAM FOR MALIGNANT NEOPLASM OF BREAST: Primary | ICD-10-CM

## 2023-01-05 DIAGNOSIS — E11.65 TYPE 2 DIABETES MELLITUS WITH HYPERGLYCEMIA, WITHOUT LONG-TERM CURRENT USE OF INSULIN: ICD-10-CM

## 2023-01-05 DIAGNOSIS — G89.29 CHRONIC LOW BACK PAIN, UNSPECIFIED BACK PAIN LATERALITY, UNSPECIFIED WHETHER SCIATICA PRESENT: ICD-10-CM

## 2023-01-05 DIAGNOSIS — K21.9 GASTROESOPHAGEAL REFLUX DISEASE WITHOUT ESOPHAGITIS: ICD-10-CM

## 2023-01-05 DIAGNOSIS — M25.512 CHRONIC PAIN OF BOTH SHOULDERS: ICD-10-CM

## 2023-01-05 DIAGNOSIS — E78.2 MIXED HYPERLIPIDEMIA: ICD-10-CM

## 2023-01-05 DIAGNOSIS — F41.9 ANXIETY: ICD-10-CM

## 2023-01-05 DIAGNOSIS — Z23 NEED FOR INFLUENZA VACCINATION: ICD-10-CM

## 2023-01-05 DIAGNOSIS — M54.50 CHRONIC LOW BACK PAIN, UNSPECIFIED BACK PAIN LATERALITY, UNSPECIFIED WHETHER SCIATICA PRESENT: ICD-10-CM

## 2023-01-05 DIAGNOSIS — G89.29 CHRONIC PAIN OF BOTH SHOULDERS: ICD-10-CM

## 2023-01-05 DIAGNOSIS — M54.10 RADICULOPATHY AFFECTING UPPER EXTREMITY: ICD-10-CM

## 2023-01-05 DIAGNOSIS — R91.8 LUNG NODULES: ICD-10-CM

## 2023-01-05 DIAGNOSIS — I10 ESSENTIAL HYPERTENSION: ICD-10-CM

## 2023-01-05 PROCEDURE — 99214 OFFICE O/P EST MOD 30 MIN: CPT | Performed by: NURSE PRACTITIONER

## 2023-01-05 PROCEDURE — 80305 DRUG TEST PRSMV DIR OPT OBS: CPT | Performed by: NURSE PRACTITIONER

## 2023-01-05 PROCEDURE — 90471 IMMUNIZATION ADMIN: CPT | Performed by: NURSE PRACTITIONER

## 2023-01-05 PROCEDURE — 90686 IIV4 VACC NO PRSV 0.5 ML IM: CPT | Performed by: NURSE PRACTITIONER

## 2023-01-05 RX ORDER — FAMOTIDINE 40 MG/1
40 TABLET, FILM COATED ORAL
Qty: 90 TABLET | Refills: 1 | Status: SHIPPED | OUTPATIENT
Start: 2023-01-05

## 2023-01-05 RX ORDER — BUSPIRONE HYDROCHLORIDE 5 MG/1
5 TABLET ORAL 3 TIMES DAILY
Qty: 270 TABLET | Refills: 1 | Status: SHIPPED | OUTPATIENT
Start: 2023-01-05

## 2023-01-05 RX ORDER — OMEPRAZOLE 40 MG/1
40 CAPSULE, DELAYED RELEASE ORAL DAILY
Qty: 90 CAPSULE | Refills: 1 | Status: SHIPPED | OUTPATIENT
Start: 2023-01-05

## 2023-01-05 RX ORDER — METFORMIN HYDROCHLORIDE 500 MG/1
1000 TABLET, EXTENDED RELEASE ORAL 2 TIMES DAILY
Qty: 360 TABLET | Refills: 1 | Status: SHIPPED | OUTPATIENT
Start: 2023-01-05

## 2023-01-05 RX ORDER — BENAZEPRIL HYDROCHLORIDE 40 MG/1
40 TABLET, FILM COATED ORAL DAILY
Qty: 90 TABLET | Refills: 1 | Status: SHIPPED | OUTPATIENT
Start: 2023-01-05

## 2023-01-05 RX ORDER — AMLODIPINE BESYLATE 10 MG/1
10 TABLET ORAL DAILY
Qty: 90 TABLET | Refills: 1 | Status: SHIPPED | OUTPATIENT
Start: 2023-01-05

## 2023-01-05 RX ORDER — CARVEDILOL 12.5 MG/1
12.5 TABLET ORAL 2 TIMES DAILY WITH MEALS
Qty: 180 TABLET | Refills: 1 | Status: SHIPPED | OUTPATIENT
Start: 2023-01-05

## 2023-01-05 RX ORDER — ATORVASTATIN CALCIUM 10 MG/1
10 TABLET, FILM COATED ORAL
Qty: 90 TABLET | Refills: 1 | Status: SHIPPED | OUTPATIENT
Start: 2023-01-05

## 2023-01-05 RX ORDER — MELOXICAM 15 MG/1
15 TABLET ORAL DAILY
Qty: 90 TABLET | Refills: 1 | Status: SHIPPED | OUTPATIENT
Start: 2023-01-05

## 2023-01-05 RX ORDER — SPIRONOLACTONE 25 MG/1
25 TABLET ORAL DAILY
Qty: 90 TABLET | Refills: 1 | Status: SHIPPED | OUTPATIENT
Start: 2023-01-05 | End: 2023-02-20

## 2023-01-05 NOTE — PROGRESS NOTES
Chief Complaint  Diabetes, Hypertension, Hyperlipidemia, Heartburn, Shoulder Pain (Bilateral/), and Wrist Pain (Shock feeling)    Subjective        Marlo Barney presents to [facility]  History of Present Illness  GERD:  Doing well on medication    Lumbago:  Doing well and needs refill of Tramadol.    Diabetes:  Doing well on diet.  Has been on for years but would like to try something else.  However, has a high deductible so newer medicines are out of financial range.  Kidney function has been good and discussed      Hypertension:  Doing well 124/72    Hyperlipidemia:  Doing well on medication. States has been having some leg cramps.    Shoulders bilateral having pain when raising shoulders over head.  Also having pain when reaches behind her.  Denies any known trauma or causative issues.  Didn't get xrays last year due to poor insurance.  Having radiculopathy into hands now and usually in the evening or not at work.    Diabetes  Pertinent negatives for hypoglycemia include no dizziness. Pertinent negatives for diabetes include no chest pain and no weakness.   Hypertension  Pertinent negatives include no chest pain, palpitations or shortness of breath.   Hyperlipidemia  Pertinent negatives include no chest pain or shortness of breath.   Heartburn  She reports no chest pain or no coughing.   Wrist Pain   Pertinent negatives include no fever or numbness.         Past History:    Medical History: has a past medical history of Anxiety, Arthritis, Depression, Diabetes mellitus (HCC), Diverticulitis, GERD (gastroesophageal reflux disease), Headache, Hyperlipidemia, and Hypertension.     Surgical History: has a past surgical history that includes Tonsillectomy; Gastric bypass; Endometrial ablation; and Hernia repair.     Family History: family history includes COPD in her father; Cerebral aneurysm in her paternal grandfather; Diabetes in her maternal aunt, maternal grandmother,  mother, and paternal aunt; Heart disease in her father, maternal grandmother, mother, and paternal grandfather; Hyperlipidemia in her mother; Hypertension in her mother; Leukemia in her maternal grandfather; Myelodysplastic syndrome in her paternal grandmother; Stroke in her maternal grandfather; Thyroid disease in her paternal grandmother.     Social History: reports that she has quit smoking. Her smoking use included cigarettes. She has never used smokeless tobacco. She reports current alcohol use. She reports that she does not use drugs.    Allergies: Patient has no known allergies.          Current Outpatient Medications:   •  acetaminophen (TYLENOL) 650 MG 8 hr tablet, Tylenol Arthritis Pain 650 mg oral tablet extended release take 2 tablets by oral route every 8 hours   Active, Disp: , Rfl:   •  amLODIPine (NORVASC) 10 MG tablet, Take 1 tablet by mouth Daily., Disp: 90 tablet, Rfl: 1  •  aspirin 81 MG EC tablet, aspirin 81 mg oral tablet,delayed release (DR/EC) take 1 tablet (81 mg) by oral route once daily   Active, Disp: , Rfl:   •  atorvastatin (LIPITOR) 10 MG tablet, Take 1 tablet by mouth every night at bedtime., Disp: 90 tablet, Rfl: 1  •  benazepril (LOTENSIN) 40 MG tablet, Take 1 tablet by mouth Daily., Disp: 90 tablet, Rfl: 1  •  busPIRone (BUSPAR) 5 MG tablet, Take 1 tablet by mouth 3 (Three) Times a Day., Disp: 270 tablet, Rfl: 1  •  carvedilol (COREG) 12.5 MG tablet, Take 1 tablet by mouth 2 (Two) Times a Day With Meals., Disp: 180 tablet, Rfl: 1  •  Cholecalciferol 50 MCG (2000 UT) capsule, Vitamin D3 50 mcg (2,000 unit) oral capsule take 1 capsule by oral route daily   Active, Disp: , Rfl:   •  famotidine (PEPCID) 40 MG tablet, Take 1 tablet by mouth every night at bedtime., Disp: 90 tablet, Rfl: 1  •  meloxicam (MOBIC) 15 MG tablet, Take 1 tablet by mouth Daily., Disp: 90 tablet, Rfl: 1  •  metFORMIN ER (GLUCOPHAGE-XR) 500 MG 24 hr tablet, Take 2 tablets by mouth 2 (Two) Times a Day., Disp: 360  tablet, Rfl: 1  •  omeprazole (priLOSEC) 40 MG capsule, Take 1 capsule by mouth Daily. Take before a meal, Disp: 90 capsule, Rfl: 1  •  spironolactone (ALDACTONE) 25 MG tablet, Take 1 tablet by mouth Daily. M,W,F, Disp: 90 tablet, Rfl: 1  •  traMADol (ULTRAM) 50 MG tablet, Take 1 tablet by mouth Every 6 (Six) Hours As Needed for Moderate Pain., Disp: 90 tablet, Rfl: 0    Medications Discontinued During This Encounter   Medication Reason   • metFORMIN ER (GLUCOPHAGE-XR) 500 MG 24 hr tablet Reorder   • meloxicam (MOBIC) 15 MG tablet Reorder   • atorvastatin (LIPITOR) 10 MG tablet Reorder   • benazepril (LOTENSIN) 40 MG tablet Reorder   • amLODIPine (NORVASC) 10 MG tablet Reorder   • carvedilol (COREG) 12.5 MG tablet Reorder   • omeprazole (priLOSEC) 40 MG capsule Reorder   • famotidine (PEPCID) 40 MG tablet Reorder   • spironolactone (ALDACTONE) 25 MG tablet Reorder   • busPIRone (BUSPAR) 5 MG tablet Reorder         Review of Systems   Constitutional: Negative for fever.   Respiratory: Negative for cough and shortness of breath.    Cardiovascular: Negative for chest pain, palpitations and leg swelling.   Neurological: Negative for dizziness, weakness, numbness and headache.        Objective         Vitals:    01/05/23 1005   BP: 123/72   BP Location: Right arm   Patient Position: Sitting   Cuff Size: Large Adult   Pulse: 74   Resp: 16   Temp: 96.9 °F (36.1 °C)   TempSrc: Temporal   SpO2: 96%   Weight: 121 kg (265 lb 12.8 oz)   Height: 167.6 cm (65.98\")     Body mass index is 42.92 kg/m².         Physical Exam  Vitals reviewed.   Constitutional:       Appearance: Normal appearance. She is well-developed.   HENT:      Head: Normocephalic and atraumatic.      Mouth/Throat:      Pharynx: No oropharyngeal exudate.   Eyes:      Conjunctiva/sclera: Conjunctivae normal.      Pupils: Pupils are equal, round, and reactive to light.   Cardiovascular:      Rate and Rhythm: Normal rate and regular rhythm.      Pulses:            Dorsalis pedis pulses are 2+ on the right side and 2+ on the left side.      Heart sounds: Normal heart sounds. No murmur heard.    No friction rub. No gallop.   Pulmonary:      Effort: Pulmonary effort is normal.      Breath sounds: Normal breath sounds. No wheezing or rhonchi.   Feet:      Right foot:      Protective Sensation: 10 sites tested. 10 sites sensed.      Skin integrity: Skin integrity normal. No ulcer or blister.      Toenail Condition: Right toenails are normal.      Left foot:      Protective Sensation: 10 sites tested. 10 sites sensed.      Skin integrity: Skin integrity normal. No ulcer or blister.      Toenail Condition: Left toenails are normal.      Comments:     Swelling in legs but is on medication.   Skin:     General: Skin is warm and dry.   Neurological:      Mental Status: She is alert and oriented to person, place, and time.   Psychiatric:         Mood and Affect: Mood and affect normal.         Behavior: Behavior normal.         Thought Content: Thought content normal.         Judgment: Judgment normal.             Result Review :               Assessment and Plan     Diagnoses and all orders for this visit:    1. Encounter for screening mammogram for malignant neoplasm of breast (Primary)  -     Mammo Screening Digital Tomosynthesis Bilateral With CAD; Future    2. Post-menopausal  -     DEXA Bone Density Axial; Future    3. Lung nodules  -     CT Chest Without Contrast; Future    4. Need for influenza vaccination  -     FluLaval/Fluzone >6 mos ([id number])    5. Anxiety  -     busPIRone (BUSPAR) 5 MG tablet; Take 1 tablet by mouth 3 (Three) Times a Day.  Dispense: 270 tablet; Refill: 1    6. Chronic low back pain, unspecified back pain laterality, unspecified whether sciatica present  Comments:  continue tramadol at current dose.  Orders:  -     meloxicam (MOBIC) 15 MG tablet; Take 1 tablet by mouth Daily.  Dispense: 90 tablet; Refill: 1  -     POC Urine Drug Screen [facility]  [facility]    7. Type 2 diabetes mellitus with hyperglycemia, without long-term current use of insulin (HCC)  -     metFORMIN ER (GLUCOPHAGE-XR) 500 MG 24 hr tablet; Take 2 tablets by mouth 2 (Two) Times a Day.  Dispense: 360 tablet; Refill: 1  -     CBC & Differential; Future  -     Comprehensive Metabolic Panel; Future  -     Hemoglobin A1c; Future  -     Urinalysis With Culture If Indicated -; Future  -     Lipid Panel; Future  -     MicroAlbumin, Urine, Random - Urine, Clean Catch; Future    8. Mixed hyperlipidemia  -     atorvastatin (LIPITOR) 10 MG tablet; Take 1 tablet by mouth every night at bedtime.  Dispense: 90 tablet; Refill: 1    9. Essential hypertension  -     amLODIPine (NORVASC) 10 MG tablet; Take 1 tablet by mouth Daily.  Dispense: 90 tablet; Refill: 1  -     benazepril (LOTENSIN) 40 MG tablet; Take 1 tablet by mouth Daily.  Dispense: 90 tablet; Refill: 1  -     carvedilol (COREG) 12.5 MG tablet; Take 1 tablet by mouth 2 (Two) Times a Day With Meals.  Dispense: 180 tablet; Refill: 1  -     spironolactone (ALDACTONE) 25 MG tablet; Take 1 tablet by mouth Daily. M,W,F  Dispense: 90 tablet; Refill: 1    10. Gastroesophageal reflux disease without esophagitis  -     famotidine (PEPCID) 40 MG tablet; Take 1 tablet by mouth every night at bedtime.  Dispense: 90 tablet; Refill: 1  -     omeprazole (priLOSEC) 40 MG capsule; Take 1 capsule by mouth Daily. Take before a meal  Dispense: 90 capsule; Refill: 1    11. Chronic pain of both shoulders  -     XR Shoulder 2+ View Right; Future  -     XR Shoulder 2+ View Left; Future    12. Radiculopathy affecting upper extremity  -     XR Spine Cervical Complete 4 or 5 View; Future      Discussed compression stockings for leg and swelling.        Follow Up     Return in about 6 months (around 7/5/2023).    Patient was given instructions and counseling regarding her condition or for health maintenance advice. Please see specific information pulled into the AVS if  appropriate.

## 2023-01-25 DIAGNOSIS — G89.29 CHRONIC BACK PAIN, UNSPECIFIED BACK LOCATION, UNSPECIFIED BACK PAIN LATERALITY: ICD-10-CM

## 2023-01-25 DIAGNOSIS — M54.9 CHRONIC BACK PAIN, UNSPECIFIED BACK LOCATION, UNSPECIFIED BACK PAIN LATERALITY: ICD-10-CM

## 2023-01-25 RX ORDER — TRAMADOL HYDROCHLORIDE 50 MG/1
TABLET ORAL
Qty: 90 TABLET | Refills: 0 | Status: SHIPPED | OUTPATIENT
Start: 2023-01-25 | End: 2023-03-15

## 2023-02-10 ENCOUNTER — DOCUMENTATION (OUTPATIENT)
Dept: FAMILY MEDICINE CLINIC | Facility: CLINIC | Age: 58
End: 2023-02-10
Payer: COMMERCIAL

## 2023-02-20 DIAGNOSIS — I10 ESSENTIAL HYPERTENSION: ICD-10-CM

## 2023-02-20 RX ORDER — SPIRONOLACTONE 25 MG/1
TABLET ORAL
Qty: 90 TABLET | Refills: 1 | Status: SHIPPED | OUTPATIENT
Start: 2023-02-20

## 2023-03-14 DIAGNOSIS — M54.9 CHRONIC BACK PAIN, UNSPECIFIED BACK LOCATION, UNSPECIFIED BACK PAIN LATERALITY: ICD-10-CM

## 2023-03-14 DIAGNOSIS — G89.29 CHRONIC BACK PAIN, UNSPECIFIED BACK LOCATION, UNSPECIFIED BACK PAIN LATERALITY: ICD-10-CM

## 2023-03-15 RX ORDER — TRAMADOL HYDROCHLORIDE 50 MG/1
TABLET ORAL
Qty: 90 TABLET | Refills: 0 | Status: SHIPPED | OUTPATIENT
Start: 2023-03-15

## 2023-03-22 ENCOUNTER — LAB (OUTPATIENT)
Dept: LAB | Facility: HOSPITAL | Age: 58
End: 2023-03-22
Payer: COMMERCIAL

## 2023-03-22 ENCOUNTER — HOSPITAL ENCOUNTER (OUTPATIENT)
Dept: GENERAL RADIOLOGY | Facility: HOSPITAL | Age: 58
Discharge: HOME | End: 2023-03-22
Payer: COMMERCIAL

## 2023-03-22 ENCOUNTER — OFFICE VISIT (OUTPATIENT)
Dept: FAMILY MEDICINE CLINIC | Facility: CLINIC | Age: 58
End: 2023-03-22
Payer: COMMERCIAL

## 2023-03-22 VITALS
DIASTOLIC BLOOD PRESSURE: 70 MMHG | HEART RATE: 74 BPM | BODY MASS INDEX: 41.33 KG/M2 | WEIGHT: 257.2 LBS | RESPIRATION RATE: 16 BRPM | OXYGEN SATURATION: 97 % | SYSTOLIC BLOOD PRESSURE: 134 MMHG | TEMPERATURE: 95.8 F | HEIGHT: 66 IN

## 2023-03-22 DIAGNOSIS — M25.512 CHRONIC PAIN OF BOTH SHOULDERS: ICD-10-CM

## 2023-03-22 DIAGNOSIS — M54.2 CERVICALGIA: Primary | ICD-10-CM

## 2023-03-22 DIAGNOSIS — G89.29 CHRONIC PAIN OF BOTH SHOULDERS: ICD-10-CM

## 2023-03-22 DIAGNOSIS — M25.511 CHRONIC PAIN OF BOTH SHOULDERS: ICD-10-CM

## 2023-03-22 DIAGNOSIS — R10.822 LEFT UPPER QUADRANT ABDOMINAL TENDERNESS WITH REBOUND TENDERNESS: Primary | ICD-10-CM

## 2023-03-22 DIAGNOSIS — E11.65 TYPE 2 DIABETES MELLITUS WITH HYPERGLYCEMIA, WITHOUT LONG-TERM CURRENT USE OF INSULIN: ICD-10-CM

## 2023-03-22 DIAGNOSIS — M54.10 RADICULOPATHY AFFECTING UPPER EXTREMITY: ICD-10-CM

## 2023-03-22 LAB
ALBUMIN SERPL-MCNC: 4.2 G/DL (ref 3.5–5.2)
ALBUMIN UR-MCNC: <1.2 MG/DL
ALBUMIN/GLOB SERPL: 1.5 G/DL
ALP SERPL-CCNC: 52 U/L (ref 39–117)
ALT SERPL W P-5'-P-CCNC: 10 U/L (ref 1–33)
ANION GAP SERPL CALCULATED.3IONS-SCNC: 10 MMOL/L (ref 5–15)
AST SERPL-CCNC: 14 U/L (ref 1–32)
BACTERIA UR QL AUTO: ABNORMAL /HPF
BILIRUB SERPL-MCNC: 0.3 MG/DL (ref 0–1.2)
BILIRUB UR QL STRIP: NEGATIVE
BUN SERPL-MCNC: 15 MG/DL (ref 6–20)
BUN/CREAT SERPL: 23.8 (ref 7–25)
CALCIUM SPEC-SCNC: 9.2 MG/DL (ref 8.6–10.5)
CHLORIDE SERPL-SCNC: 102 MMOL/L (ref 98–107)
CHOLEST SERPL-MCNC: 128 MG/DL (ref 0–200)
CLARITY UR: CLEAR
CO2 SERPL-SCNC: 27 MMOL/L (ref 22–29)
COLOR UR: YELLOW
CREAT SERPL-MCNC: 0.63 MG/DL (ref 0.57–1)
EGFRCR SERPLBLD CKD-EPI 2021: 104.9 ML/MIN/1.73
GLOBULIN UR ELPH-MCNC: 2.8 GM/DL
GLUCOSE SERPL-MCNC: 147 MG/DL (ref 65–99)
GLUCOSE UR STRIP-MCNC: NEGATIVE MG/DL
HBA1C MFR BLD: 7.4 % (ref 4.8–5.6)
HDLC SERPL QL: 4
HDLC SERPL-MCNC: 32 MG/DL (ref 40–60)
HGB UR QL STRIP.AUTO: NEGATIVE
HYALINE CASTS UR QL AUTO: ABNORMAL /LPF
KETONES UR QL STRIP: NEGATIVE
LDLC SERPL CALC-MCNC: 73 MG/DL (ref 0–100)
LEUKOCYTE ESTERASE UR QL STRIP.AUTO: ABNORMAL
NITRITE UR QL STRIP: NEGATIVE
PH UR STRIP.AUTO: 6 [PH] (ref 5–8)
POTASSIUM SERPL-SCNC: 4.5 MMOL/L (ref 3.5–5.2)
PROT SERPL-MCNC: 7 G/DL (ref 6–8.5)
PROT UR QL STRIP: NEGATIVE
RBC # UR STRIP: ABNORMAL /HPF
REF LAB TEST METHOD: ABNORMAL
SODIUM SERPL-SCNC: 139 MMOL/L (ref 136–145)
SP GR UR STRIP: 1.01 (ref 1–1.03)
SQUAMOUS #/AREA URNS HPF: ABNORMAL /HPF
TRIGL SERPL-MCNC: 131 MG/DL (ref 0–150)
UROBILINOGEN UR QL STRIP: ABNORMAL
VLDLC SERPL-MCNC: 23 MG/DL (ref 5–40)
WBC # UR STRIP: ABNORMAL /HPF

## 2023-03-22 PROCEDURE — 80061 LIPID PANEL: CPT

## 2023-03-22 PROCEDURE — 36415 COLL VENOUS BLD VENIPUNCTURE: CPT

## 2023-03-22 PROCEDURE — 73030 X-RAY EXAM OF SHOULDER: CPT

## 2023-03-22 PROCEDURE — 80053 COMPREHEN METABOLIC PANEL: CPT

## 2023-03-22 PROCEDURE — 81001 URINALYSIS AUTO W/SCOPE: CPT

## 2023-03-22 PROCEDURE — 72050 X-RAY EXAM NECK SPINE 4/5VWS: CPT

## 2023-03-22 PROCEDURE — 82043 UR ALBUMIN QUANTITATIVE: CPT

## 2023-03-22 PROCEDURE — 83036 HEMOGLOBIN GLYCOSYLATED A1C: CPT

## 2023-03-22 PROCEDURE — 99213 OFFICE O/P EST LOW 20 MIN: CPT | Performed by: NURSE PRACTITIONER

## 2023-03-22 NOTE — PROGRESS NOTES
Chief Complaint  possible hernia  (LUQ)    Subjective        Marlo Barney presents to [facility]  History of Present Illness  Has a history of hernia but now has a LUQ tenderness   History of gastric bypass and pressure is better after a bowel movement but no blood in stool and not vomiting although at times nauseated.      The following portions of the patient's history were personally reviewed and updated as appropriate: allergies, current medications, past medical history, past surgical history, past family history, and past social history.     Body mass index is 41.53 kg/m².    Class 3 Severe Obesity (BMI >=40). Obesity-related health conditions include the following: diabetes mellitus. Obesity is unchanged. BMI is is above average; BMI management plan is completed. We discussed portion control and increasing exercise.      Past History:    Medical History: has a past medical history of Anxiety, Arthritis, Depression, Diabetes mellitus (HCC), Diverticulitis, GERD (gastroesophageal reflux disease), Headache, Hyperlipidemia, and Hypertension.     Surgical History: has a past surgical history that includes Tonsillectomy; Gastric bypass; Endometrial ablation; and Hernia repair.     Family History: family history includes COPD in her father; Cerebral aneurysm in her paternal grandfather; Diabetes in her maternal aunt, maternal grandmother, mother, and paternal aunt; Heart disease in her father, maternal grandmother, mother, and paternal grandfather; Hyperlipidemia in her mother; Hypertension in her mother; Leukemia in her maternal grandfather; Myelodysplastic syndrome in her paternal grandmother; Stroke in her maternal grandfather; Thyroid disease in her paternal grandmother.     Social History: reports that she has quit smoking. Her smoking use included cigarettes. She has never used smokeless tobacco. She reports current alcohol use. She reports that she does not use  drugs.    Allergies: Patient has no known allergies.          Current Outpatient Medications:   •  acetaminophen (TYLENOL) 650 MG 8 hr tablet, Tylenol Arthritis Pain 650 mg oral tablet extended release take 2 tablets by oral route every 8 hours   Active, Disp: , Rfl:   •  amLODIPine (NORVASC) 10 MG tablet, Take 1 tablet by mouth Daily., Disp: 90 tablet, Rfl: 1  •  aspirin 81 MG EC tablet, aspirin 81 mg oral tablet,delayed release (DR/EC) take 1 tablet (81 mg) by oral route once daily   Active, Disp: , Rfl:   •  atorvastatin (LIPITOR) 10 MG tablet, Take 1 tablet by mouth every night at bedtime., Disp: 90 tablet, Rfl: 1  •  benazepril (LOTENSIN) 40 MG tablet, Take 1 tablet by mouth Daily., Disp: 90 tablet, Rfl: 1  •  busPIRone (BUSPAR) 5 MG tablet, Take 1 tablet by mouth 3 (Three) Times a Day., Disp: 270 tablet, Rfl: 1  •  carvedilol (COREG) 12.5 MG tablet, Take 1 tablet by mouth 2 (Two) Times a Day With Meals., Disp: 180 tablet, Rfl: 1  •  Cholecalciferol 50 MCG (2000 UT) capsule, Vitamin D3 50 mcg (2,000 unit) oral capsule take 1 capsule by oral route daily   Active, Disp: , Rfl:   •  famotidine (PEPCID) 40 MG tablet, Take 1 tablet by mouth every night at bedtime., Disp: 90 tablet, Rfl: 1  •  meloxicam (MOBIC) 15 MG tablet, Take 1 tablet by mouth Daily., Disp: 90 tablet, Rfl: 1  •  metFORMIN ER (GLUCOPHAGE-XR) 500 MG 24 hr tablet, Take 2 tablets by mouth 2 (Two) Times a Day., Disp: 360 tablet, Rfl: 1  •  omeprazole (priLOSEC) 40 MG capsule, Take 1 capsule by mouth Daily. Take before a meal, Disp: 90 capsule, Rfl: 1  •  spironolactone (ALDACTONE) 25 MG tablet, TAKE 1 TABLET BY MOUTH DAILY ON MONDAY, WEDNESDAY, AND FRIDAY., Disp: 90 tablet, Rfl: 1  •  traMADol (ULTRAM) 50 MG tablet, TAKE 1 TABLET BY MOUTH EVERY 6 HOURS AS NEEDED FOR PAIN (MODERATE PAIN), Disp: 90 tablet, Rfl: 0    There are no discontinued medications.      Review of Systems   Constitutional: Negative for fever.   Respiratory: Negative for cough and  "shortness of breath.    Cardiovascular: Negative for chest pain, palpitations and leg swelling.   Neurological: Negative for dizziness, weakness, numbness and headache.        Objective         Vitals:    03/22/23 1008   BP: 134/70   BP Location: Right arm   Patient Position: Sitting   Cuff Size: Large Adult   Pulse: 74   Resp: 16   Temp: 95.8 °F (35.4 °C)   TempSrc: Temporal   SpO2: 97%   Weight: 117 kg (257 lb 3.2 oz)   Height: 167.6 cm (65.98\")     Body mass index is 41.53 kg/m².         Physical Exam  Vitals reviewed.   Constitutional:       Appearance: Normal appearance. She is well-developed.   HENT:      Head: Normocephalic and atraumatic.      Mouth/Throat:      Pharynx: No oropharyngeal exudate.   Eyes:      Conjunctiva/sclera: Conjunctivae normal.      Pupils: Pupils are equal, round, and reactive to light.   Cardiovascular:      Rate and Rhythm: Normal rate and regular rhythm.      Heart sounds: Normal heart sounds. No murmur heard.    No friction rub. No gallop.   Pulmonary:      Effort: Pulmonary effort is normal.      Breath sounds: Normal breath sounds. No wheezing or rhonchi.   Abdominal:      General: Bowel sounds are normal.      Palpations: Abdomen is soft.      Tenderness: There is abdominal tenderness.          Comments: Tenderness and at times feels like reducible but painful area to stomach.   Skin:     General: Skin is warm and dry.   Neurological:      Mental Status: She is alert and oriented to person, place, and time.   Psychiatric:         Mood and Affect: Mood and affect normal.         Behavior: Behavior normal.         Thought Content: Thought content normal.         Judgment: Judgment normal.             Result Review :               Assessment and Plan     Diagnoses and all orders for this visit:    1. Left upper quadrant abdominal tenderness with rebound tenderness (Primary)  -     CT Abdomen Pelvis With & Without Contrast; Future              Follow Up     Return for Next scheduled " follow up.    Patient was given instructions and counseling regarding her condition or for health maintenance advice. Please see specific information pulled into the AVS if appropriate.

## 2023-04-20 ENCOUNTER — HOSPITAL ENCOUNTER (OUTPATIENT)
Dept: MAMMOGRAPHY | Facility: HOSPITAL | Age: 58
Discharge: HOME | End: 2023-04-20
Admitting: NURSE PRACTITIONER
Payer: COMMERCIAL

## 2023-04-20 ENCOUNTER — APPOINTMENT (OUTPATIENT)
Dept: CT IMAGING | Facility: HOSPITAL | Age: 58
End: 2023-04-20
Payer: COMMERCIAL

## 2023-04-20 ENCOUNTER — APPOINTMENT (OUTPATIENT)
Dept: BONE DENSITY | Facility: HOSPITAL | Age: 58
End: 2023-04-20
Payer: COMMERCIAL

## 2023-04-20 DIAGNOSIS — Z12.31 ENCOUNTER FOR SCREENING MAMMOGRAM FOR MALIGNANT NEOPLASM OF BREAST: ICD-10-CM

## 2023-04-20 PROCEDURE — 77063 BREAST TOMOSYNTHESIS BI: CPT

## 2023-04-20 PROCEDURE — 77067 SCR MAMMO BI INCL CAD: CPT

## 2023-05-23 DIAGNOSIS — M54.9 CHRONIC BACK PAIN, UNSPECIFIED BACK LOCATION, UNSPECIFIED BACK PAIN LATERALITY: ICD-10-CM

## 2023-05-23 DIAGNOSIS — G89.29 CHRONIC BACK PAIN, UNSPECIFIED BACK LOCATION, UNSPECIFIED BACK PAIN LATERALITY: ICD-10-CM

## 2023-05-24 RX ORDER — TRAMADOL HYDROCHLORIDE 50 MG/1
TABLET ORAL
Qty: 90 TABLET | Refills: 0 | Status: SHIPPED | OUTPATIENT
Start: 2023-05-24

## 2023-08-18 ENCOUNTER — OFFICE VISIT (OUTPATIENT)
Dept: FAMILY MEDICINE CLINIC | Facility: CLINIC | Age: 58
End: 2023-08-18
Payer: COMMERCIAL

## 2023-08-18 ENCOUNTER — LAB (OUTPATIENT)
Dept: LAB | Facility: HOSPITAL | Age: 58
End: 2023-08-18
Payer: COMMERCIAL

## 2023-08-18 VITALS
WEIGHT: 262.8 LBS | HEIGHT: 66 IN | RESPIRATION RATE: 16 BRPM | BODY MASS INDEX: 42.23 KG/M2 | SYSTOLIC BLOOD PRESSURE: 130 MMHG | OXYGEN SATURATION: 98 % | HEART RATE: 70 BPM | DIASTOLIC BLOOD PRESSURE: 82 MMHG | TEMPERATURE: 97 F

## 2023-08-18 DIAGNOSIS — E78.2 MIXED HYPERLIPIDEMIA: ICD-10-CM

## 2023-08-18 DIAGNOSIS — R10.822 LEFT UPPER QUADRANT ABDOMINAL TENDERNESS WITH REBOUND TENDERNESS: Primary | ICD-10-CM

## 2023-08-18 DIAGNOSIS — K21.9 GASTROESOPHAGEAL REFLUX DISEASE WITHOUT ESOPHAGITIS: ICD-10-CM

## 2023-08-18 DIAGNOSIS — M54.50 CHRONIC LOW BACK PAIN, UNSPECIFIED BACK PAIN LATERALITY, UNSPECIFIED WHETHER SCIATICA PRESENT: ICD-10-CM

## 2023-08-18 DIAGNOSIS — M54.10 RADICULOPATHY AFFECTING UPPER EXTREMITY: ICD-10-CM

## 2023-08-18 DIAGNOSIS — I10 ESSENTIAL HYPERTENSION: ICD-10-CM

## 2023-08-18 DIAGNOSIS — E11.65 TYPE 2 DIABETES MELLITUS WITH HYPERGLYCEMIA, WITHOUT LONG-TERM CURRENT USE OF INSULIN: ICD-10-CM

## 2023-08-18 DIAGNOSIS — G89.29 CHRONIC LOW BACK PAIN, UNSPECIFIED BACK PAIN LATERALITY, UNSPECIFIED WHETHER SCIATICA PRESENT: ICD-10-CM

## 2023-08-18 DIAGNOSIS — M54.9 CHRONIC BACK PAIN, UNSPECIFIED BACK LOCATION, UNSPECIFIED BACK PAIN LATERALITY: ICD-10-CM

## 2023-08-18 DIAGNOSIS — G89.29 CHRONIC BACK PAIN, UNSPECIFIED BACK LOCATION, UNSPECIFIED BACK PAIN LATERALITY: ICD-10-CM

## 2023-08-18 DIAGNOSIS — M54.2 CERVICALGIA: ICD-10-CM

## 2023-08-18 LAB
ALBUMIN SERPL-MCNC: 4.4 G/DL (ref 3.5–5.2)
ALBUMIN UR-MCNC: <1.2 MG/DL
ALBUMIN/GLOB SERPL: 1.8 G/DL
ALP SERPL-CCNC: 48 U/L (ref 39–117)
ALT SERPL W P-5'-P-CCNC: 11 U/L (ref 1–33)
ANION GAP SERPL CALCULATED.3IONS-SCNC: 13.7 MMOL/L (ref 5–15)
AST SERPL-CCNC: 14 U/L (ref 1–32)
BACTERIA UR QL AUTO: ABNORMAL /HPF
BASOPHILS # BLD AUTO: 0.08 10*3/MM3 (ref 0–0.2)
BASOPHILS NFR BLD AUTO: 0.8 % (ref 0–1.5)
BILIRUB SERPL-MCNC: 0.3 MG/DL (ref 0–1.2)
BILIRUB UR QL STRIP: NEGATIVE
BUN SERPL-MCNC: 11 MG/DL (ref 6–20)
BUN/CREAT SERPL: 18 (ref 7–25)
CALCIUM SPEC-SCNC: 9.5 MG/DL (ref 8.6–10.5)
CHLORIDE SERPL-SCNC: 101 MMOL/L (ref 98–107)
CHOLEST SERPL-MCNC: 145 MG/DL (ref 0–200)
CLARITY UR: CLEAR
CO2 SERPL-SCNC: 23.3 MMOL/L (ref 22–29)
COLOR UR: YELLOW
CREAT SERPL-MCNC: 0.61 MG/DL (ref 0.57–1)
DEPRECATED RDW RBC AUTO: 40.7 FL (ref 37–54)
EGFRCR SERPLBLD CKD-EPI 2021: 105.1 ML/MIN/1.73
EOSINOPHIL # BLD AUTO: 0.31 10*3/MM3 (ref 0–0.4)
EOSINOPHIL NFR BLD AUTO: 2.9 % (ref 0.3–6.2)
ERYTHROCYTE [DISTWIDTH] IN BLOOD BY AUTOMATED COUNT: 12.6 % (ref 12.3–15.4)
GLOBULIN UR ELPH-MCNC: 2.5 GM/DL
GLUCOSE SERPL-MCNC: 127 MG/DL (ref 65–99)
GLUCOSE UR STRIP-MCNC: NEGATIVE MG/DL
HBA1C MFR BLD: 6.8 % (ref 4.8–5.6)
HCT VFR BLD AUTO: 38.7 % (ref 34–46.6)
HDLC SERPL-MCNC: 31 MG/DL (ref 40–60)
HGB BLD-MCNC: 13.1 G/DL (ref 12–15.9)
HGB UR QL STRIP.AUTO: NEGATIVE
HYALINE CASTS UR QL AUTO: ABNORMAL /LPF
IMM GRANULOCYTES # BLD AUTO: 0.03 10*3/MM3 (ref 0–0.05)
IMM GRANULOCYTES NFR BLD AUTO: 0.3 % (ref 0–0.5)
KETONES UR QL STRIP: NEGATIVE
LDLC SERPL CALC-MCNC: 71 MG/DL (ref 0–100)
LDLC/HDLC SERPL: 1.99 {RATIO}
LEUKOCYTE ESTERASE UR QL STRIP.AUTO: ABNORMAL
LYMPHOCYTES # BLD AUTO: 3.4 10*3/MM3 (ref 0.7–3.1)
LYMPHOCYTES NFR BLD AUTO: 32.3 % (ref 19.6–45.3)
MCH RBC QN AUTO: 29.8 PG (ref 26.6–33)
MCHC RBC AUTO-ENTMCNC: 33.9 G/DL (ref 31.5–35.7)
MCV RBC AUTO: 88 FL (ref 79–97)
MONOCYTES # BLD AUTO: 0.59 10*3/MM3 (ref 0.1–0.9)
MONOCYTES NFR BLD AUTO: 5.6 % (ref 5–12)
NEUTROPHILS NFR BLD AUTO: 58.1 % (ref 42.7–76)
NEUTROPHILS NFR BLD AUTO: 6.12 10*3/MM3 (ref 1.7–7)
NITRITE UR QL STRIP: NEGATIVE
NRBC BLD AUTO-RTO: 0 /100 WBC (ref 0–0.2)
PH UR STRIP.AUTO: 6 [PH] (ref 5–8)
PLATELET # BLD AUTO: 367 10*3/MM3 (ref 140–450)
PMV BLD AUTO: 10.5 FL (ref 6–12)
POTASSIUM SERPL-SCNC: 4.6 MMOL/L (ref 3.5–5.2)
PROT SERPL-MCNC: 6.9 G/DL (ref 6–8.5)
PROT UR QL STRIP: NEGATIVE
RBC # BLD AUTO: 4.4 10*6/MM3 (ref 3.77–5.28)
RBC # UR STRIP: ABNORMAL /HPF
REF LAB TEST METHOD: ABNORMAL
SODIUM SERPL-SCNC: 138 MMOL/L (ref 136–145)
SP GR UR STRIP: 1.01 (ref 1–1.03)
SQUAMOUS #/AREA URNS HPF: ABNORMAL /HPF
TRIGL SERPL-MCNC: 261 MG/DL (ref 0–150)
UROBILINOGEN UR QL STRIP: ABNORMAL
VLDLC SERPL-MCNC: 43 MG/DL (ref 5–40)
WBC # UR STRIP: ABNORMAL /HPF
WBC NRBC COR # BLD: 10.53 10*3/MM3 (ref 3.4–10.8)

## 2023-08-18 PROCEDURE — 80061 LIPID PANEL: CPT

## 2023-08-18 PROCEDURE — 82043 UR ALBUMIN QUANTITATIVE: CPT

## 2023-08-18 PROCEDURE — 36415 COLL VENOUS BLD VENIPUNCTURE: CPT

## 2023-08-18 PROCEDURE — 80053 COMPREHEN METABOLIC PANEL: CPT

## 2023-08-18 PROCEDURE — 83036 HEMOGLOBIN GLYCOSYLATED A1C: CPT

## 2023-08-18 PROCEDURE — 85025 COMPLETE CBC W/AUTO DIFF WBC: CPT

## 2023-08-18 PROCEDURE — 99214 OFFICE O/P EST MOD 30 MIN: CPT | Performed by: NURSE PRACTITIONER

## 2023-08-18 PROCEDURE — 81001 URINALYSIS AUTO W/SCOPE: CPT

## 2023-08-18 RX ORDER — ETODOLAC 400 MG/1
400 TABLET, EXTENDED RELEASE ORAL DAILY
Qty: 90 TABLET | Refills: 1 | Status: SHIPPED | OUTPATIENT
Start: 2023-08-18

## 2023-08-18 RX ORDER — LIDOCAINE 50 MG/G
1 PATCH TOPICAL EVERY 24 HOURS
Qty: 90 EACH | Refills: 0 | Status: SHIPPED | OUTPATIENT
Start: 2023-08-18

## 2023-08-18 RX ORDER — PANTOPRAZOLE SODIUM 40 MG/1
40 TABLET, DELAYED RELEASE ORAL DAILY
Qty: 90 TABLET | Refills: 1 | Status: SHIPPED | OUTPATIENT
Start: 2023-08-18

## 2023-08-18 RX ORDER — TRAMADOL HYDROCHLORIDE 50 MG/1
TABLET ORAL
Qty: 90 TABLET | Refills: 0 | Status: SHIPPED | OUTPATIENT
Start: 2023-08-18

## 2023-08-18 RX ORDER — FAMOTIDINE 40 MG/1
40 TABLET, FILM COATED ORAL 2 TIMES DAILY
Qty: 180 TABLET | Refills: 1 | Status: SHIPPED | OUTPATIENT
Start: 2023-08-18

## 2023-08-18 NOTE — PROGRESS NOTES
Answers submitted by the patient for this visit:  Other (Submitted on 8/16/2023)  Please describe your symptoms.: Follow up from x-rays and scans.  Have several things to discuss: shoulder, stomach,back, heart burn  Have you had these symptoms before?: Yes  How long have you been having these symptoms?: Greater than 2 weeks  Please list any medications you are currently taking for this condition.: Tramadol and tums  Please describe any probable cause for these symptoms. : Open for discussion  Primary Reason for Visit (Submitted on 8/16/2023)  What is the primary reason for your visit?: Other  Chief Complaint  Diabetes, Hypertension, Hyperlipidemia, Heartburn, Shoulder Pain (bilateral), Leg Pain (Left - stinging in legs, restless), Abdominal Pain (Diarrhea, nausea), Numbness (Bilateral hands in the am), and CT results (Had abd/pelvis done and is shows the nodule you wanted with the ct lungs so she didn't do it)    Subjective        Marlo Barney presents to [facility]  History of Present Illness  GERD:  Doing well on medication    Lumbago:  Doing well and needs refill of Tramadol.    Diabetes:  Doing well on diet.  Has been on for years but would like to try something else.  However, has a high deductible so newer medicines are out of financial range.  Kidney function has been good and discussed.  Fasting around 120.     Still having abdominal pain and is still having bouts of diarrhea and cramps.  With hemorrhoids.  Has heartburn and stomach pain when not taking both a PPI and H2 blocker.    Shoulders pain and can't get comfortable    Numbness of hands when she wakes up and left more than the right.    Lower back pain  as well and has a history of sciatica.  Some days worse than others  having some restless legs at night that is new.  Tramadol is helping but needs 3 a day to help.  Not sleeping well due to pain in back and shoulders and can't get  comfortable.          Hypertension:  Doing well 124/72    Hyperlipidemia:  Doing well on medication. States has been having some leg cramps.    Shoulders bilateral having pain when raising shoulders over head.  Also having pain when reaches behind her.  Denies any known trauma or causative issues.  Didn't get xrays last year due to poor insurance.  Having radiculopathy into hands now and usually in the evening or not at work.    Diabetes  Pertinent negatives for hypoglycemia include no dizziness. Pertinent negatives for diabetes include no chest pain and no weakness.   Hypertension  Pertinent negatives include no chest pain, palpitations or shortness of breath.   Hyperlipidemia  Associated symptoms include leg pain. Pertinent negatives include no chest pain or shortness of breath.   Heartburn  She complains of abdominal pain. She reports no chest pain or no coughing.   Leg Pain   Pertinent negatives include no numbness.   Abdominal Pain  Pertinent negatives include no fever. Her past medical history is significant for GERD.   Wrist Pain   Pertinent negatives include no fever or numbness.     The following portions of the patient's history were personally reviewed and updated as appropriate: allergies, current medications, past medical history, past surgical history, past family history, and past social history.     Body mass index is 42.44 kg/mý.           Past History:    Medical History: has a past medical history of Anxiety, Arthritis, Depression, Diabetes mellitus, Diverticulitis, GERD (gastroesophageal reflux disease), Headache, Hyperlipidemia, and Hypertension.     Surgical History: has a past surgical history that includes Tonsillectomy; Gastric bypass; Endometrial ablation; and Hernia repair.     Family History: family history includes COPD in her father; Cerebral aneurysm in her paternal grandfather; Diabetes in her maternal aunt, maternal grandmother, mother, and paternal aunt; Heart disease in her father,  maternal grandmother, mother, and paternal grandfather; Hyperlipidemia in her mother; Hypertension in her mother; Leukemia in her maternal grandfather; Myelodysplastic syndrome in her paternal grandmother; Stroke in her maternal grandfather; Thyroid disease in her paternal grandmother.     Social History: reports that she quit smoking about 36 years ago. Her smoking use included cigarettes. She started smoking about 37 years ago. She has a 0.25 pack-year smoking history. She has been exposed to tobacco smoke. She has never used smokeless tobacco. She reports current alcohol use. She reports that she does not use drugs.    Allergies: Patient has no known allergies.          Current Outpatient Medications:     acetaminophen (TYLENOL) 650 MG 8 hr tablet, Tylenol Arthritis Pain 650 mg oral tablet extended release take 2 tablets by oral route every 8 hours   Active, Disp: , Rfl:     amLODIPine (NORVASC) 10 MG tablet, TAKE 1 TABLET BY MOUTH EVERY DAY, Disp: 90 tablet, Rfl: 1    aspirin 81 MG EC tablet, aspirin 81 mg oral tablet,delayed release (DR/EC) take 1 tablet (81 mg) by oral route once daily   Active, Disp: , Rfl:     atorvastatin (LIPITOR) 10 MG tablet, TAKE 1 TABLET BY MOUTH EVERYDAY AT BEDTIME, Disp: 90 tablet, Rfl: 1    benazepril (LOTENSIN) 40 MG tablet, TAKE 1 TABLET BY MOUTH EVERY DAY, Disp: 90 tablet, Rfl: 1    busPIRone (BUSPAR) 5 MG tablet, Take 1 tablet by mouth 3 (Three) Times a Day., Disp: 270 tablet, Rfl: 1    carvedilol (COREG) 12.5 MG tablet, TAKE 1 TABLET BY MOUTH TWICE A DAY WITH MEALS, Disp: 180 tablet, Rfl: 1    Cholecalciferol 50 MCG (2000 UT) capsule, Vitamin D3 50 mcg (2,000 unit) oral capsule take 1 capsule by oral route daily   Active, Disp: , Rfl:     famotidine (PEPCID) 40 MG tablet, Take 1 tablet by mouth 2 (Two) Times a Day., Disp: 180 tablet, Rfl: 1    metFORMIN ER (GLUCOPHAGE-XR) 500 MG 24 hr tablet, TAKE 2 TABLETS BY MOUTH TWICE A DAY, Disp: 360 tablet, Rfl: 1    spironolactone  "(ALDACTONE) 25 MG tablet, TAKE 1 TABLET BY MOUTH DAILY ON MONDAY, WEDNESDAY, AND FRIDAY., Disp: 90 tablet, Rfl: 1    traMADol (ULTRAM) 50 MG tablet, TAKE 1 TABLET BY MOUTH EVERY 6 HOURS AS NEEDED FOR PAIN (MODERATE PAIN), Disp: 90 tablet, Rfl: 0    etodolac XL (LODINE XL) 400 MG 24 hr tablet, Take 1 tablet by mouth Daily., Disp: 90 tablet, Rfl: 1    lidocaine (LIDODERM) 5 %, Place 1 patch on the skin as directed by provider Daily. Remove & Discard patch within 12 hours or as directed by MD, Disp: 90 each, Rfl: 0    pantoprazole (PROTONIX) 40 MG EC tablet, Take 1 tablet by mouth Daily., Disp: 90 tablet, Rfl: 1    Medications Discontinued During This Encounter   Medication Reason    omeprazole (priLOSEC) 40 MG capsule *Therapy completed    meloxicam (MOBIC) 15 MG tablet *Therapy completed    famotidine (PEPCID) 40 MG tablet Reorder         Review of Systems   Constitutional:  Negative for fever.   Respiratory:  Negative for cough and shortness of breath.    Cardiovascular:  Negative for chest pain and palpitations.   Gastrointestinal:  Positive for abdominal pain.   Neurological:  Negative for dizziness, weakness and numbness.      Objective         Vitals:    08/18/23 1324   BP: 130/82   BP Location: Right arm   Patient Position: Sitting   Cuff Size: Large Adult   Pulse: 70   Resp: 16   Temp: 97 øF (36.1 øC)   TempSrc: Temporal   SpO2: 98%   Weight: 119 kg (262 lb 12.8 oz)   Height: 167.6 cm (65.98\")     Body mass index is 42.44 kg/mý.         Physical Exam  Vitals reviewed.   Constitutional:       Appearance: Normal appearance. She is well-developed.   HENT:      Head: Normocephalic and atraumatic.      Mouth/Throat:      Pharynx: No oropharyngeal exudate.   Eyes:      Conjunctiva/sclera: Conjunctivae normal.      Pupils: Pupils are equal, round, and reactive to light.   Cardiovascular:      Rate and Rhythm: Normal rate and regular rhythm.      Heart sounds: Normal heart sounds. No murmur heard.    No friction rub. " No gallop.   Pulmonary:      Effort: Pulmonary effort is normal.      Breath sounds: Normal breath sounds. No wheezing or rhonchi.   Abdominal:      General: Bowel sounds are normal.      Palpations: Abdomen is soft.      Tenderness: There is abdominal tenderness.   Musculoskeletal:        Arms:       Comments: Tenderness and radiculopathy in legs and hands.   Skin:     General: Skin is warm and dry.   Neurological:      Mental Status: She is alert and oriented to person, place, and time.   Psychiatric:         Mood and Affect: Mood and affect normal.         Behavior: Behavior normal.         Thought Content: Thought content normal.         Judgment: Judgment normal.           Result Review :               Assessment and Plan     Diagnoses and all orders for this visit:    1. Left upper quadrant abdominal tenderness with rebound tenderness (Primary)  -     Ambulatory Referral to Gastroenterology    2. Essential hypertension    3. Type 2 diabetes mellitus with hyperglycemia, without long-term current use of insulin  -     CBC & Differential; Future  -     Comprehensive Metabolic Panel; Future  -     Hemoglobin A1c; Future  -     Urinalysis With Culture If Indicated -; Future  -     Lipid Panel; Future  -     Microalbumin / Creatinine Urine Ratio - Urine, Clean Catch; Future    4. Mixed hyperlipidemia    5. Gastroesophageal reflux disease without esophagitis  -     pantoprazole (PROTONIX) 40 MG EC tablet; Take 1 tablet by mouth Daily.  Dispense: 90 tablet; Refill: 1  -     famotidine (PEPCID) 40 MG tablet; Take 1 tablet by mouth 2 (Two) Times a Day.  Dispense: 180 tablet; Refill: 1    6. Chronic low back pain, unspecified back pain laterality, unspecified whether sciatica present  -     Ambulatory Referral to [facility] Management Clinic  -     etodolac XL (LODINE XL) 400 MG 24 hr tablet; Take 1 tablet by mouth Daily.  Dispense: 90 tablet; Refill: 1  -     lidocaine (LIDODERM) 5 %; Place 1 patch on the skin as directed by  provider Daily. Remove & Discard patch within 12 hours or as directed by MD  Dispense: 90 each; Refill: 0    7. Radiculopathy affecting upper extremity  -     Ambulatory Referral to Pain Management Clinic    8. Cervicalgia  -     Ambulatory Referral to Pain Management Clinic  -     etodolac XL (LODINE XL) 400 MG 24 hr tablet; Take 1 tablet by mouth Daily.  Dispense: 90 tablet; Refill: 1  -     lidocaine (LIDODERM) 5 %; Place 1 patch on the skin as directed by provider Daily. Remove & Discard patch within 12 hours or as directed by MD  Dispense: 90 each; Refill: 0        Will try to get PPI back on her list with insurance since having issues when only taking H2 blocker and taking tums multiple times a day.      Follow Up     Return in about 6 months (around 2/18/2024).    Patient was given instructions and counseling regarding her condition or for health maintenance advice. Please see specific information pulled into the AVS if appropriate.

## 2023-08-24 ENCOUNTER — TELEPHONE (OUTPATIENT)
Dept: FAMILY MEDICINE CLINIC | Facility: CLINIC | Age: 58
End: 2023-08-24
Payer: COMMERCIAL

## 2023-08-24 DIAGNOSIS — R10.822 LEFT UPPER QUADRANT ABDOMINAL TENDERNESS WITH REBOUND TENDERNESS: Primary | ICD-10-CM

## 2023-08-24 NOTE — TELEPHONE ENCOUNTER
Pt can't get in with Gastro until February and has been in a lot of pain and doesn't want to wait that long, she would like to try Yenni.

## 2023-08-31 ENCOUNTER — PATIENT MESSAGE (OUTPATIENT)
Dept: FAMILY MEDICINE CLINIC | Facility: CLINIC | Age: 58
End: 2023-08-31
Payer: COMMERCIAL

## 2023-08-31 DIAGNOSIS — M25.512 CHRONIC PAIN OF BOTH SHOULDERS: Primary | ICD-10-CM

## 2023-08-31 DIAGNOSIS — G89.29 CHRONIC PAIN OF BOTH SHOULDERS: Primary | ICD-10-CM

## 2023-08-31 DIAGNOSIS — M25.511 CHRONIC PAIN OF BOTH SHOULDERS: Primary | ICD-10-CM

## 2023-09-01 ENCOUNTER — PATIENT MESSAGE (OUTPATIENT)
Dept: FAMILY MEDICINE CLINIC | Facility: CLINIC | Age: 58
End: 2023-09-01
Payer: COMMERCIAL

## 2023-09-01 NOTE — TELEPHONE ENCOUNTER
From: Marlo Barney  To: Tung Arias  Sent: 8/31/2023 10:18 AM EDT  Subject: Ortho referral    I have not heard from ortho to schedule an appt. I don't remember the name to call myself.

## 2023-09-06 ENCOUNTER — PATIENT MESSAGE (OUTPATIENT)
Dept: FAMILY MEDICINE CLINIC | Facility: CLINIC | Age: 58
End: 2023-09-06
Payer: COMMERCIAL

## 2023-09-06 DIAGNOSIS — R11.0 NAUSEA: Primary | ICD-10-CM

## 2023-09-06 RX ORDER — ONDANSETRON 4 MG/1
4 TABLET, FILM COATED ORAL EVERY 8 HOURS PRN
Qty: 20 TABLET | Refills: 0 | Status: SHIPPED | OUTPATIENT
Start: 2023-09-06

## 2023-09-06 NOTE — TELEPHONE ENCOUNTER
From: Marlo Barney  To: Tung Arias  Sent: 9/1/2023 12:54 PM EDT  Subject: Short term disability    I filed for short term disability today. Approx end date 10/31/23. Have upper GI [facility]colonospy on 10/11/23. You will be getting paperwork about it. I have attached the medical release form. Let me know if you need anything else.

## 2023-09-06 NOTE — TELEPHONE ENCOUNTER
From: Marlo Barney  To: Tung Arias  Sent: 9/6/2023 9:07 AM EDT  Subject: Nausea     Can you call me in anything for nausea? I am having rounds of it. Wouldn't [place] to [facility] on file. Please and thank you.

## 2023-09-08 ENCOUNTER — OFFICE VISIT (OUTPATIENT)
Dept: ORTHOPEDIC SURGERY | Facility: CLINIC | Age: 58
End: 2023-09-08
Payer: COMMERCIAL

## 2023-09-08 VITALS
HEART RATE: 65 BPM | SYSTOLIC BLOOD PRESSURE: 150 MMHG | OXYGEN SATURATION: 97 % | DIASTOLIC BLOOD PRESSURE: 84 MMHG | WEIGHT: 262 LBS | BODY MASS INDEX: 42.11 KG/M2 | HEIGHT: 66 IN

## 2023-09-08 DIAGNOSIS — M75.42 IMPINGEMENT SYNDROME OF LEFT SHOULDER: ICD-10-CM

## 2023-09-08 DIAGNOSIS — M25.512 LEFT SHOULDER PAIN, UNSPECIFIED CHRONICITY: ICD-10-CM

## 2023-09-08 DIAGNOSIS — M75.41 IMPINGEMENT SYNDROME OF RIGHT SHOULDER: ICD-10-CM

## 2023-09-08 DIAGNOSIS — M25.511 RIGHT SHOULDER PAIN, UNSPECIFIED CHRONICITY: Primary | ICD-10-CM

## 2023-09-08 RX ORDER — LIDOCAINE HYDROCHLORIDE 10 MG/ML
5 INJECTION, SOLUTION INFILTRATION; PERINEURAL
Status: COMPLETED | OUTPATIENT
Start: 2023-09-08 | End: 2023-09-08

## 2023-09-08 RX ORDER — TRIAMCINOLONE ACETONIDE 40 MG/ML
40 INJECTION, SUSPENSION INTRA-ARTICULAR; INTRAMUSCULAR
Status: COMPLETED | OUTPATIENT
Start: 2023-09-08 | End: 2023-09-08

## 2023-09-08 RX ADMIN — LIDOCAINE HYDROCHLORIDE 5 ML: 10 INJECTION, SOLUTION INFILTRATION; PERINEURAL at 10:26

## 2023-09-08 RX ADMIN — LIDOCAINE HYDROCHLORIDE 5 ML: 10 INJECTION, SOLUTION INFILTRATION; PERINEURAL at 10:27

## 2023-09-08 RX ADMIN — TRIAMCINOLONE ACETONIDE 40 MG: 40 INJECTION, SUSPENSION INTRA-ARTICULAR; INTRAMUSCULAR at 10:27

## 2023-09-08 RX ADMIN — TRIAMCINOLONE ACETONIDE 40 MG: 40 INJECTION, SUSPENSION INTRA-ARTICULAR; INTRAMUSCULAR at 10:26

## 2023-09-08 NOTE — PROGRESS NOTES
"Chief Complaint  Pain and Initial Evaluation of the Right Shoulder and Pain and Initial Evaluation of the Left Shoulder    Subjective          Marlo Barney presents to [facility] for   History of Present Illness    Marlo presents for evaluation of her bilateral shoulders.  She does not have any injury.  Her pain has been worsening over the last few months.  She has stiffness and pain with elevation.  No prior shoulder trauma or surgery.  No recent injections.    No Known Allergies     Social History     Socioeconomic History    Marital status:    Tobacco Use    Smoking status: Former     Packs/day: 0.25     Years: 1.00     Pack years: 0.25     Types: Cigarettes     Start date: 1986     Quit date: 1987     Years since quittin.0     Passive exposure: Past    Smokeless tobacco: Never   Vaping Use    Vaping Use: Never used   Substance and Sexual Activity    Alcohol use: Yes     Comment: occationally    Drug use: Never    Sexual activity: Not Currently     Partners: Male        I reviewed the patient's chief complaint, history of present illness, review of systems, past medical history, surgical history, family history, social history, medications, and allergy list.     REVIEW OF SYSTEMS    Constitutional: Denies fevers, chills, weight loss  Cardiovascular: Denies chest pain, shortness of breath  Skin: Denies rashes, acute skin changes  Neurologic: Denies headache, loss of consciousness  MSK: Bilateral shoulder pain      Objective   Vital Signs:   /84   Pulse 65   Ht 167.6 cm (66\")   Wt 119 kg (262 lb)   SpO2 97%   BMI 42.29 kg/m²     Body mass index is 42.29 kg/m².    Physical Exam    General: Alert. No acute distress.   Bilateral shoulder: Active elevation to 110 degrees, external rotation of 45 degrees, internal rotation to the back pocket.  5 out of 5 with rotator cuff testing with no pain.  Positive impingement.  Negative speeds.  Negative " [place]'s.  Neurovascular intact.    Large Joint Arthrocentesis  Date/Time: 9/8/2023 10:26 AM  Consent given by: patient  Site marked: site marked  Timeout: Immediately prior to procedure a time out was called to verify the correct patient, procedure, equipment, support staff and site/side marked as required   Supporting Documentation  Indications: pain   Procedure Details  Location: shoulder (right) -   Needle gauge: 21g.  Medications administered: 5 mL lidocaine 1 %; 40 mg triamcinolone acetonide 40 MG/ML  Patient tolerance: patient tolerated the procedure well with no immediate complications      Large Joint Arthrocentesis  Date/Time: 9/8/2023 10:27 AM  Consent given by: patient  Site marked: site marked  Timeout: Immediately prior to procedure a time out was called to verify the correct patient, procedure, equipment, support staff and site/side marked as required   Supporting Documentation  Indications: pain   Procedure Details  Location: shoulder (left) -   Needle gauge: 21g.  Medications administered: 5 mL lidocaine 1 %; 40 mg triamcinolone acetonide 40 MG/ML  Patient tolerance: patient tolerated the procedure well with no immediate complications      Imaging Results (Most Recent)       Procedure Component Value Units Date/Time    XR Shoulder 2+ View Left [[id number]] Resulted: 09/08/23 1148     Updated: 09/08/23 1148    Narrative:      Indications: Left shoulder pain    Views: AP, Grashey, Scap Y, axillary left shoulder    Findings: Moderate AC joint arthrosis.  Calcification along the rotator   cuff insertion.  Glenohumeral joint reduced.  Glenohumeral joint articular   height well-maintained.    Comparative Data: No comparative data available    XR Shoulder 2+ View Right [[id number]] Resulted: 09/08/23 1147     Updated: 09/08/23 1148    Narrative:      Indications: Right shoulder pain    Views: AP, Grashey, Scap Y, axillary right shoulder    Findings: No fractures noted.  Moderate AC joint degenerative  changes   present.  Glenohumeral joint reduced.  Glenohumeral articular height   appears well-maintained.    Comparative Data: No comparative data available                     Assessment and Plan        XR Shoulder 2+ View Left    Result Date: 9/8/2023  Narrative: Indications: Left shoulder pain Views: AP, Grashey, Scap Y, axillary left shoulder Findings: Moderate AC joint arthrosis.  Calcification along the rotator cuff insertion.  Glenohumeral joint reduced.  Glenohumeral joint articular height well-maintained. Comparative Data: No comparative data available    XR Shoulder 2+ View Right    Result Date: 9/8/2023  Narrative: Indications: Right shoulder pain Views: AP, Grashey, Scap Y, axillary right shoulder Findings: No fractures noted.  Moderate AC joint degenerative changes present.  Glenohumeral joint reduced.  Glenohumeral articular height appears well-maintained. Comparative Data: No comparative data available      Diagnoses and all orders for this visit:    1. Right shoulder pain, unspecified chronicity (Primary)  -     XR Shoulder 2+ View Right    2. Left shoulder pain, unspecified chronicity  -     XR Shoulder 2+ View Left    3. Impingement syndrome of left shoulder    4. Impingement syndrome of right shoulder    Other orders  -     Large Joint Arthrocentesis  -     Large Joint Arthrocentesis        We reviewed her imaging and discussed nonoperative management.  We discussed the risk, benefits, indications, and alternatives to bilateral shoulder steroid injections.  She elected to proceed and tolerated the injection well.  We will provide her with home exercises.  She will follow-up in 3 months for reevaluation.  No x-rays needed when she returns.  We may consider advanced imaging if not improving.    Call or return if worsening symptoms.    Scribed for Tereso Coppola MD by Dennise Marino MA  09/08/2023   09:32 EDT         Follow Up       3 months    Patient was given instructions and counseling  regarding her condition or for health maintenance advice. Please see specific information pulled into the AVS if appropriate.       I have personally performed the services described in this document as scribed by the above individual and it is both accurate and complete.     Tereso Coppola MD  09/08/23  13:07 EDT

## 2023-09-28 DIAGNOSIS — G89.29 CHRONIC BACK PAIN, UNSPECIFIED BACK LOCATION, UNSPECIFIED BACK PAIN LATERALITY: ICD-10-CM

## 2023-09-28 DIAGNOSIS — M54.9 CHRONIC BACK PAIN, UNSPECIFIED BACK LOCATION, UNSPECIFIED BACK PAIN LATERALITY: ICD-10-CM

## 2023-09-29 RX ORDER — TRAMADOL HYDROCHLORIDE 50 MG/1
TABLET ORAL
Qty: 90 TABLET | Refills: 0 | Status: SHIPPED | OUTPATIENT
Start: 2023-09-29

## 2023-10-28 ENCOUNTER — PATIENT MESSAGE (OUTPATIENT)
Dept: FAMILY MEDICINE CLINIC | Facility: CLINIC | Age: 58
End: 2023-10-28
Payer: COMMERCIAL

## 2023-10-30 NOTE — TELEPHONE ENCOUNTER
From: Marlo Barney  To: Tung Arias  Sent: 10/28/2023 10:35 AM EDT  Subject: Follow up paper work for disability at work.    Good Morning,  I had my procedures with Dr. Gutierrez. They did not show anything. I am still having the same issues. Need to update paperwork for work and include other issues. Need to say have no return date or date out as far as can. Let me know if I need an appt.

## 2023-11-10 ENCOUNTER — OFFICE VISIT (OUTPATIENT)
Dept: FAMILY MEDICINE CLINIC | Facility: CLINIC | Age: 58
End: 2023-11-10
Payer: COMMERCIAL

## 2023-11-10 VITALS
HEIGHT: 66 IN | BODY MASS INDEX: 40.66 KG/M2 | OXYGEN SATURATION: 96 % | TEMPERATURE: 96.1 F | SYSTOLIC BLOOD PRESSURE: 114 MMHG | DIASTOLIC BLOOD PRESSURE: 80 MMHG | WEIGHT: 253 LBS | RESPIRATION RATE: 16 BRPM | HEART RATE: 72 BPM

## 2023-11-10 DIAGNOSIS — R10.822 LEFT UPPER QUADRANT ABDOMINAL TENDERNESS WITH REBOUND TENDERNESS: ICD-10-CM

## 2023-11-10 DIAGNOSIS — R11.0 NAUSEA: ICD-10-CM

## 2023-11-10 DIAGNOSIS — Z98.84 S/P BARIATRIC SURGERY: ICD-10-CM

## 2023-11-10 DIAGNOSIS — Z23 NEED FOR INFLUENZA VACCINATION: Primary | ICD-10-CM

## 2023-11-10 RX ORDER — ONDANSETRON 4 MG/1
4 TABLET, FILM COATED ORAL EVERY 8 HOURS PRN
Qty: 20 TABLET | Refills: 2 | Status: SHIPPED | OUTPATIENT
Start: 2023-11-10

## 2023-11-10 NOTE — PROGRESS NOTES
Answers submitted by the patient for this visit:  Other (Submitted on 11/8/2023)  Please describe your symptoms.: Follow up of ongoing conditions  Have you had these symptoms before?: Yes  How long have you been having these symptoms?: Greater than 2 weeks  Please list any medications you are currently taking for this condition.: Same as prescribed  Please describe any probable cause for these symptoms. : Unknown  Primary Reason for Visit (Submitted on 11/8/2023)  What is the primary reason for your visit?: Other  Chief Complaint  Abdominal Pain, Shoulder Pain (Needs FMLA form filled out), and Back Pain    Subjective        Marlo Barney presents to [facility]  History of Present Illness  Has a history of gastric bypass.  Had a EGD and didn't show a fistula but nodule found was benign.  The general surgeon stated a bariatric surgeon may be beneficial due to the gastric bypass being different that he had ever noted.  Bariatric surgery in 1986.  Original loss of 30-40 pounds but weight gain returned.  Didn't have support or insurance at the time.  Continues to have abdominal pain.  Still has to limit size of bite due to surgery and has lost 50 pounds in the last year and not for trying.  But because can't eat.  Abdominal Pain  Pertinent negatives include no fever.   Back Pain  Associated symptoms include abdominal pain. Pertinent negatives include no chest pain, fever, numbness or weakness.       The following portions of the patient's history were personally reviewed and updated as appropriate: allergies, current medications, past medical history, past surgical history, past family history, and past social history.     Body mass index is 40.86 kg/m².           Past History:    Medical History: has a past medical history of Anxiety, Arthritis, Depression, Diabetes mellitus, Diverticulitis, GERD (gastroesophageal reflux disease), Headache, Hyperlipidemia, and Hypertension.      Surgical History: has a past surgical history that includes Tonsillectomy; Gastric bypass; Endometrial ablation; and Hernia repair.     Family History: family history includes COPD in her father; Cerebral aneurysm in her paternal grandfather; Diabetes in her maternal aunt, maternal grandmother, mother, and paternal aunt; Heart disease in her father, maternal grandmother, mother, and paternal grandfather; Hyperlipidemia in her mother; Hypertension in her mother; Leukemia in her maternal grandfather; Myelodysplastic syndrome in her paternal grandmother; Stroke in her maternal grandfather; Thyroid disease in her paternal grandmother.     Social History: reports that she quit smoking about 36 years ago. Her smoking use included cigarettes. She started smoking about 37 years ago. She has a 0.25 pack-year smoking history. She has been exposed to tobacco smoke. She has never used smokeless tobacco. She reports current alcohol use. She reports that she does not use drugs.    Allergies: Patient has no known allergies.          Current Outpatient Medications:     ondansetron (Zofran) 4 MG tablet, Take 1 tablet by mouth Every 8 (Eight) Hours As Needed for Nausea or Vomiting., Disp: 20 tablet, Rfl: 2    acetaminophen (TYLENOL) 650 MG 8 hr tablet, Tylenol Arthritis Pain 650 mg oral tablet extended release take 2 tablets by oral route every 8 hours   Active, Disp: , Rfl:     amLODIPine (NORVASC) 10 MG tablet, TAKE 1 TABLET BY MOUTH EVERY DAY, Disp: 90 tablet, Rfl: 1    aspirin 81 MG EC tablet, aspirin 81 mg oral tablet,delayed release (DR/EC) take 1 tablet (81 mg) by oral route once daily   Active, Disp: , Rfl:     atorvastatin (LIPITOR) 10 MG tablet, TAKE 1 TABLET BY MOUTH EVERYDAY AT BEDTIME, Disp: 90 tablet, Rfl: 1    benazepril (LOTENSIN) 40 MG tablet, TAKE 1 TABLET BY MOUTH EVERY DAY, Disp: 90 tablet, Rfl: 1    busPIRone (BUSPAR) 5 MG tablet, Take 1 tablet by mouth 3 (Three) Times a Day., Disp: 270 tablet, Rfl: 1     carvedilol (COREG) 12.5 MG tablet, TAKE 1 TABLET BY MOUTH TWICE A DAY WITH MEALS, Disp: 180 tablet, Rfl: 1    Cholecalciferol 50 MCG (2000 UT) capsule, Vitamin D3 50 mcg (2,000 unit) oral capsule take 1 capsule by oral route daily   Active, Disp: , Rfl:     cyanocobalamin (VITAMIN B-12) 50 MCG tablet, Take 1 tablet by mouth., Disp: , Rfl:     docusate sodium (COLACE) 50 MG capsule, Take 1 capsule by mouth., Disp: , Rfl:     etodolac XL (LODINE XL) 400 MG 24 hr tablet, Take 1 tablet by mouth Daily., Disp: 90 tablet, Rfl: 1    famotidine (PEPCID) 40 MG tablet, Take 1 tablet by mouth 2 (Two) Times a Day., Disp: 180 tablet, Rfl: 1    lidocaine (LIDODERM) 5 %, Place 1 patch on the skin as directed by provider Daily. Remove & Discard patch within 12 hours or as directed by MD, Disp: 90 each, Rfl: 0    metFORMIN ER (GLUCOPHAGE-XR) 500 MG 24 hr tablet, TAKE 2 TABLETS BY MOUTH TWICE A DAY, Disp: 360 tablet, Rfl: 1    pantoprazole (PROTONIX) 40 MG EC tablet, Take 1 tablet by mouth Daily., Disp: 90 tablet, Rfl: 1    spironolactone (ALDACTONE) 25 MG tablet, TAKE 1 TABLET BY MOUTH DAILY ON MONDAY, WEDNESDAY, AND FRIDAY., Disp: 90 tablet, Rfl: 1    traMADol (ULTRAM) 50 MG tablet, TAKE 1 TABLET BY MOUTH EVERY 6 HOURS AS NEEDED FOR PAIN (MODERATE PAIN), Disp: 90 tablet, Rfl: 0    Medications Discontinued During This Encounter   Medication Reason    ondansetron (Zofran) 4 MG tablet Reorder         Review of Systems   Constitutional:  Negative for fever.   Respiratory:  Negative for cough and shortness of breath.    Cardiovascular:  Negative for chest pain, palpitations and leg swelling.   Gastrointestinal:  Positive for abdominal pain.   Musculoskeletal:  Positive for back pain.   Neurological:  Negative for dizziness, weakness, numbness and headache.        Objective         Vitals:    11/10/23 1322   BP: 114/80   BP Location: Right arm   Patient Position: Sitting   Cuff Size: Large Adult   Pulse: 72   Resp: 16   Temp: 96.1 °F  "(35.6 °C)   TempSrc: Temporal   SpO2: 96%   Weight: 115 kg (253 lb)   Height: 167.6 cm (65.98\")     Body mass index is 40.86 kg/m².         Physical Exam  Vitals reviewed.   Constitutional:       Appearance: Normal appearance. She is well-developed.   HENT:      Head: Normocephalic and atraumatic.      Mouth/Throat:      Pharynx: No oropharyngeal exudate.   Eyes:      Conjunctiva/sclera: Conjunctivae normal.      Pupils: Pupils are equal, round, and reactive to light.   Cardiovascular:      Rate and Rhythm: Normal rate and regular rhythm.      Heart sounds: Normal heart sounds. No murmur heard.     No friction rub. No gallop.   Pulmonary:      Effort: Pulmonary effort is normal.      Breath sounds: Normal breath sounds. No wheezing or rhonchi.   Skin:     General: Skin is warm and dry.   Neurological:      Mental Status: She is alert and oriented to person, place, and time.   Psychiatric:         Mood and Affect: Mood and affect normal.         Behavior: Behavior normal.         Thought Content: Thought content normal.         Judgment: Judgment normal.             Result Review :               Assessment and Plan     Diagnoses and all orders for this visit:    1. Need for influenza vaccination (Primary)  -     Fluzone >6 Months ([id number])    2. Nausea  -     ondansetron (Zofran) 4 MG tablet; Take 1 tablet by mouth Every 8 (Eight) Hours As Needed for Nausea or Vomiting.  Dispense: 20 tablet; Refill: 2    3. Left upper quadrant abdominal tenderness with rebound tenderness    4. S/P bariatric surgery  -     Ambulatory Referral to Bariatric Surgery              Follow Up     Return in about 3 months (around 2/10/2024).    Patient was given instructions and counseling regarding her condition or for health maintenance advice. Please see specific information pulled into the AVS if appropriate.         "

## 2023-11-17 DIAGNOSIS — M54.50 CHRONIC LOW BACK PAIN, UNSPECIFIED BACK PAIN LATERALITY, UNSPECIFIED WHETHER SCIATICA PRESENT: ICD-10-CM

## 2023-11-17 DIAGNOSIS — G89.29 CHRONIC LOW BACK PAIN, UNSPECIFIED BACK PAIN LATERALITY, UNSPECIFIED WHETHER SCIATICA PRESENT: ICD-10-CM

## 2023-11-17 DIAGNOSIS — G89.29 CHRONIC BACK PAIN, UNSPECIFIED BACK LOCATION, UNSPECIFIED BACK PAIN LATERALITY: ICD-10-CM

## 2023-11-17 DIAGNOSIS — M54.9 CHRONIC BACK PAIN, UNSPECIFIED BACK LOCATION, UNSPECIFIED BACK PAIN LATERALITY: ICD-10-CM

## 2023-11-17 DIAGNOSIS — M54.2 CERVICALGIA: ICD-10-CM

## 2023-11-17 RX ORDER — TRAMADOL HYDROCHLORIDE 50 MG/1
50 TABLET ORAL EVERY 6 HOURS PRN
Qty: 90 TABLET | Refills: 0 | Status: SHIPPED | OUTPATIENT
Start: 2023-11-17

## 2023-11-17 RX ORDER — LIDOCAINE 50 MG/G
1 PATCH TOPICAL EVERY 24 HOURS
Qty: 90 PATCH | Refills: 1 | Status: SHIPPED | OUTPATIENT
Start: 2023-11-17

## 2023-12-04 ENCOUNTER — TELEPHONE (OUTPATIENT)
Dept: ORTHOPEDIC SURGERY | Facility: CLINIC | Age: 58
End: 2023-12-04
Payer: COMMERCIAL

## 2023-12-04 NOTE — TELEPHONE ENCOUNTER
lvm for patient that we needed to re-jose her apt with Dr. Coppola do to him needing to be in surg ok for [facility] to re-jose

## 2023-12-11 ENCOUNTER — OFFICE VISIT (OUTPATIENT)
Dept: ORTHOPEDIC SURGERY | Facility: CLINIC | Age: 58
End: 2023-12-11
Payer: COMMERCIAL

## 2023-12-11 VITALS
BODY MASS INDEX: 41.32 KG/M2 | HEIGHT: 65 IN | DIASTOLIC BLOOD PRESSURE: 79 MMHG | WEIGHT: 248 LBS | HEART RATE: 66 BPM | OXYGEN SATURATION: 97 % | SYSTOLIC BLOOD PRESSURE: 120 MMHG

## 2023-12-11 DIAGNOSIS — R20.2 PARESTHESIA OF HAND, BILATERAL: Primary | ICD-10-CM

## 2023-12-11 DIAGNOSIS — M48.02 CERVICAL SPINAL STENOSIS: ICD-10-CM

## 2023-12-11 DIAGNOSIS — M75.41 IMPINGEMENT SYNDROME OF RIGHT SHOULDER: ICD-10-CM

## 2023-12-11 DIAGNOSIS — M75.42 IMPINGEMENT SYNDROME OF LEFT SHOULDER: ICD-10-CM

## 2023-12-11 PROCEDURE — 99213 OFFICE O/P EST LOW 20 MIN: CPT | Performed by: STUDENT IN AN ORGANIZED HEALTH CARE EDUCATION/TRAINING PROGRAM

## 2023-12-11 NOTE — PROGRESS NOTES
"Chief Complaint  Follow-up and Pain of the Left Shoulder and Follow-up and Pain of the Right Shoulder    Subjective          Marlo Barney presents to [facility] for   History of Present Illness    The patient presents here today for follow up evaluation of the bilateral shoulder pain. She has been treating her bilateral shoulder impingement conservatively. She had injections 3 months ago. She got about 2 months of relief with the left shoulder and only about 1 week for the right shoulder. She denies relief with the home exercises. She admits to numbness and tingling to her hands as well. She states she wears gloves. She does office type work.     No Known Allergies     Social History     Socioeconomic History    Marital status:    Tobacco Use    Smoking status: Former     Packs/day: 0.25     Years: 1.00     Additional pack years: 0.00     Total pack years: 0.25     Types: Cigarettes     Start date: 1986     Quit date: 1987     Years since quittin.3     Passive exposure: Past    Smokeless tobacco: Never   Vaping Use    Vaping Use: Never used   Substance and Sexual Activity    Alcohol use: Yes     Comment: occationally    Drug use: Never    Sexual activity: Not Currently     Partners: Male        I reviewed the patient's chief complaint, history of present illness, review of systems, past medical history, surgical history, family history, social history, medications, and allergy list.     REVIEW OF SYSTEMS    Constitutional: Denies fevers, chills, weight loss  Cardiovascular: Denies chest pain, shortness of breath  Skin: Denies rashes, acute skin changes  Neurologic: Denies headache, loss of consciousness  MSK: bilateral upper extremity pain      Objective   Vital Signs:   /79   Pulse 66   Ht 165.1 cm (65\")   Wt 112 kg (248 lb)   SpO2 97%   BMI 41.27 kg/m²     Body mass index is 41.27 kg/m².    Physical Exam    General: Alert. No acute distress. "   Bilateral shoulder: Active elevation to 110 degrees, external rotation of 45 degrees, internal rotation to the back pocket.  5 out of 5 with rotator cuff testing with no pain.  Positive impingement.  Negative speeds.  Negative [place]'s.  Neurovascular intact. Positive tinel and compression testing at the wrists.     Procedures    Imaging Results (Most Recent)       None                     Assessment and Plan        No results found.     Diagnoses and all orders for this visit:    1. Paresthesia of hand, bilateral (Primary)    2. Impingement syndrome of left shoulder    3. Impingement syndrome of right shoulder    4. Cervical spinal stenosis        Discussed the treatment plan with the patient.  Plan for MRI of the bilateral shoulders to evaluate the rotator cuff. The patient expressed understanding and wished to proceed.   Discussed the treatment plan with the patient.  Plan for EMG of the bilateral hands to evaluate her carpal tunnel syndrome. The patient expressed understanding and wished to proceed.         Call or return if worsening symptoms.    Scribed for Tereso Coppola MD by Freda Crain  12/11/2023   13:21 EST         Follow Up       MRI and EMG results    Patient was given instructions and counseling regarding her condition or for health maintenance advice. Please see specific information pulled into the AVS if appropriate.       I have personally performed the services described in this document as scribed by the above individual and it is both accurate and complete.     Tereso Coppola MD  12/11/23  13:28 EST

## 2023-12-29 DIAGNOSIS — G89.29 CHRONIC BACK PAIN, UNSPECIFIED BACK LOCATION, UNSPECIFIED BACK PAIN LATERALITY: ICD-10-CM

## 2023-12-29 DIAGNOSIS — M54.9 CHRONIC BACK PAIN, UNSPECIFIED BACK LOCATION, UNSPECIFIED BACK PAIN LATERALITY: ICD-10-CM

## 2023-12-29 RX ORDER — TRAMADOL HYDROCHLORIDE 50 MG/1
50 TABLET ORAL EVERY 6 HOURS PRN
Qty: 90 TABLET | Refills: 0 | Status: SHIPPED | OUTPATIENT
Start: 2023-12-29

## 2024-01-09 DIAGNOSIS — M75.41 IMPINGEMENT SYNDROME OF RIGHT SHOULDER: ICD-10-CM

## 2024-01-11 ENCOUNTER — OFFICE VISIT (OUTPATIENT)
Dept: BARIATRICS/WEIGHT MGMT | Facility: CLINIC | Age: 59
End: 2024-01-11
Payer: COMMERCIAL

## 2024-01-11 VITALS
TEMPERATURE: 96.9 F | WEIGHT: 246 LBS | BODY MASS INDEX: 43.59 KG/M2 | HEART RATE: 60 BPM | DIASTOLIC BLOOD PRESSURE: 92 MMHG | SYSTOLIC BLOOD PRESSURE: 153 MMHG | HEIGHT: 63 IN

## 2024-01-11 DIAGNOSIS — I10 ESSENTIAL HYPERTENSION: ICD-10-CM

## 2024-01-11 DIAGNOSIS — E66.01 OBESITY, CLASS III, BMI 40-49.9 (MORBID OBESITY): Primary | ICD-10-CM

## 2024-01-11 DIAGNOSIS — K21.9 GASTROESOPHAGEAL REFLUX DISEASE, UNSPECIFIED WHETHER ESOPHAGITIS PRESENT: ICD-10-CM

## 2024-01-11 DIAGNOSIS — E11.69 DIABETES MELLITUS TYPE 2 IN OBESE: ICD-10-CM

## 2024-01-11 DIAGNOSIS — Z98.84 HISTORY OF ROUX-EN-Y GASTRIC BYPASS: ICD-10-CM

## 2024-01-11 DIAGNOSIS — K59.1 FUNCTIONAL DIARRHEA: ICD-10-CM

## 2024-01-11 DIAGNOSIS — K91.1 DUMPING SYNDROME: ICD-10-CM

## 2024-01-11 DIAGNOSIS — E66.9 DIABETES MELLITUS TYPE 2 IN OBESE: ICD-10-CM

## 2024-01-11 DIAGNOSIS — R10.32 LEFT LOWER QUADRANT ABDOMINAL PAIN: ICD-10-CM

## 2024-01-11 PROBLEM — E66.813 OBESITY, CLASS III, BMI 40-49.9 (MORBID OBESITY): Status: ACTIVE | Noted: 2024-01-11

## 2024-01-11 NOTE — PATIENT INSTRUCTIONS
Bariatric Manual    You were provided a manual specific to the procedure that you have chosen.  Please refer to that with any questions or call the office at [phone] Metamucil one Tablespoon in 8 oz of water then follow with another 8 oz of water in the morning or evening

## 2024-01-11 NOTE — PROGRESS NOTES
[facility]  [address] [address]  [phone]  [address] [address]  [phone]  Dept: [phone]  1/11/2024      Marlo Barney.  [id number]  [id number]  1965  female      Chief Complaint   Patient presents with    Consult     KEEGAN - RNY 1986       BH Post-Op Bariatric Surgery:   Marlo Barney is status post Open RNY Gastric Bypass procedure, performed on 1986 at [facility].    HPI:   Today's weight is 112 kg (246 lb) pounds, today's BMI is Body mass index is 43.05 kg/m².. HIS@ greatest weight loss from surgery was 60 pounds. The patient reports an unwanted weight gain of 0 pounds.  [unfilled] denies fever, chills, chest pain, SOA, melena, hematochezia, hematemesis, dysuria, frequency, hematuria, jaundice.    58-year-old female status post open Shashank-en-Y gastric bypass by unknown surgeon at [facility] who started having problems about 8 months ago.  She is lost about 20 pounds since that time.  She states that she has explosive diarrhea after eating certain meals that is intermittent in nature.  She describes the pain in the left lower quadrant during these episodes.  She has not had this in the past.  She went through her daily routine and is drinking regular sodas at times and also eating sugar snacks.  She has had very extensive workup including endoscopy upper and lower as well as CT scan of the abdomen and pelvis.  Everything is turned up negative.  She was referred to us for further evaluation.  She went through her daily routine and eats breakfast on a regular basis and does lunch or a snack in the afternoon and then does meals in the evening.  She will do sweets like a dessert or cookies for snack after dinner.  She is a diabetic on metformin.  Status post cholecystectomy      Diet and Exercise:   Diet history reviewed and  discussed with the patient. Weight loss/gains to date discussed with the patient. She reports eating 2-3 meals per day, a typical portion size of 1 cup, eating 2 snacks per day, drinking 3 or more 8-oz. glasses of water per day, no carbonated beverage consumption and exercising regularly.     Supplements: Vitamin D, p.o. B12    Review of Systems   Constitutional:  Positive for fatigue.   Gastrointestinal:  Positive for abdominal pain and constipation.   Musculoskeletal:  Positive for arthralgias and back pain.   All other systems reviewed and are negative.      Patient Active Problem List   Diagnosis    Diabetes    Essential hypertension    Hyperlipidemia    Anemia    Anxiety    Asthma    Disorder    Diverticulitis    Headache    Low back pain    GERD (gastroesophageal reflux disease)    ERRONEOUS ENCOUNTER--DISREGARD    Left lower quadrant abdominal pain    Functional diarrhea    Obesity, Class III, BMI 40-49.9 (morbid obesity)    History of Shashank-en-Y gastric bypass    Dumping syndrome    Diabetes mellitus type 2 in obese       Past Medical History:   Diagnosis Date    Anxiety     Arthritis     Depression     Diabetes mellitus     Diverticulitis     GERD (gastroesophageal reflux disease)     Headache     Hyperlipidemia     Hypertension        Past Surgical History:   Procedure Laterality Date    ENDOMETRIAL ABLATION      GASTRIC BYPASS  1986    HERNIA REPAIR      TONSILLECTOMY         No Known Allergies      Current Outpatient Medications:     acetaminophen (TYLENOL) 650 MG 8 hr tablet, Tylenol Arthritis Pain 650 mg oral tablet extended release take 2 tablets by oral route every 8 hours   Active, Disp: , Rfl:     amLODIPine (NORVASC) 10 MG tablet, TAKE 1 TABLET BY MOUTH EVERY DAY, Disp: 90 tablet, Rfl: 1    aspirin 81 MG EC tablet, aspirin 81 mg oral tablet,delayed release (DR/EC) take 1 tablet (81 mg) by oral route once daily   Active, Disp: , Rfl:     atorvastatin (LIPITOR) 10 MG tablet, TAKE 1 TABLET BY MOUTH  EVERYDAY AT BEDTIME, Disp: 90 tablet, Rfl: 1    benazepril (LOTENSIN) 40 MG tablet, TAKE 1 TABLET BY MOUTH EVERY DAY, Disp: 90 tablet, Rfl: 1    busPIRone (BUSPAR) 5 MG tablet, Take 1 tablet by mouth 3 (Three) Times a Day., Disp: 270 tablet, Rfl: 1    carvedilol (COREG) 12.5 MG tablet, TAKE 1 TABLET BY MOUTH TWICE A DAY WITH MEALS, Disp: 180 tablet, Rfl: 1    Cholecalciferol 50 MCG (2000 UT) capsule, Vitamin D3 50 mcg (2,000 unit) oral capsule take 1 capsule by oral route daily   Active, Disp: , Rfl:     cyanocobalamin (VITAMIN B-12) 50 MCG tablet, Take 1 tablet by mouth., Disp: , Rfl:     docusate sodium (COLACE) 50 MG capsule, Take 1 capsule by mouth., Disp: , Rfl:     etodolac XL (LODINE XL) 400 MG 24 hr tablet, Take 1 tablet by mouth Daily., Disp: 90 tablet, Rfl: 1    famotidine (PEPCID) 40 MG tablet, Take 1 tablet by mouth 2 (Two) Times a Day., Disp: 180 tablet, Rfl: 1    lidocaine (LIDODERM) 5 %, PLACE 1 PATCH ON THE SKIN AS DIRECTED BY PROVIDER DAILY. REMOVE & DISCARD PATCH WITHIN 12 HOURS OR AS DIRECTED BY MD, Disp: 90 patch, Rfl: 1    metFORMIN ER (GLUCOPHAGE-XR) 500 MG 24 hr tablet, TAKE 2 TABLETS BY MOUTH TWICE A DAY, Disp: 360 tablet, Rfl: 1    ondansetron (Zofran) 4 MG tablet, Take 1 tablet by mouth Every 8 (Eight) Hours As Needed for Nausea or Vomiting., Disp: 20 tablet, Rfl: 2    pantoprazole (PROTONIX) 40 MG EC tablet, Take 1 tablet by mouth Daily., Disp: 90 tablet, Rfl: 1    spironolactone (ALDACTONE) 25 MG tablet, TAKE 1 TABLET BY MOUTH DAILY ON MONDAY, WEDNESDAY, AND FRIDAY., Disp: 90 tablet, Rfl: 1    traMADol (ULTRAM) 50 MG tablet, TAKE 1 TABLET BY MOUTH EVERY 6 HOURS AS NEEDED FOR MODERATE PAIN, Disp: 90 tablet, Rfl: 0    Social History     Socioeconomic History    Marital status:    Tobacco Use    Smoking status: Former     Packs/day: 0.25     Years: 1.00     Additional pack years: 0.00     Total pack years: 0.25     Types: Cigarettes     Start date: 8/18/1986     Quit date: 8/18/1987      Years since quittin.4     Passive exposure: Past    Smokeless tobacco: Never   Vaping Use    Vaping Use: Never used   Substance and Sexual Activity    Alcohol use: Yes     Comment: occationally    Drug use: Never    Sexual activity: Not Currently     Partners: Male       Family History   Problem Relation Age of Onset    Diabetes Mother     Hyperlipidemia Mother     Heart disease Mother     Hypertension Mother     Heart disease Father     COPD Father     Diabetes Maternal Aunt     Diabetes Paternal Aunt     Heart disease Maternal Grandmother     Diabetes Maternal Grandmother     Leukemia Maternal Grandfather     Stroke Maternal Grandfather     Thyroid disease Paternal Grandmother     Myelodysplastic syndrome Paternal Grandmother     Cerebral aneurysm Paternal Grandfather     Heart disease Paternal Grandfather        The following portions of the patient's history were reviewed and updated as appropriate: allergies, current medications, past family history, past medical history, past social history, past surgical history, and problem list.    Vitals:    24 1416   BP: 153/92   Pulse: 60   Temp: 96.9 °F (36.1 °C)       Physical Exam  Vitals reviewed.   HENT:      Head: Normocephalic and atraumatic.      Mouth/Throat:      Mouth: Mucous membranes are moist.      Pharynx: Oropharynx is clear.   Eyes:      General: No scleral icterus.     Extraocular Movements: Extraocular movements intact.      Conjunctiva/sclera: Conjunctivae normal.      Pupils: Pupils are equal, round, and reactive to light.   Neck:      Thyroid: No thyromegaly.   Cardiovascular:      Rate and Rhythm: Normal rate.   Pulmonary:      Effort: Pulmonary effort is normal. No respiratory distress.      Breath sounds: Normal breath sounds. No stridor. No wheezing or rhonchi.   Abdominal:      General: Bowel sounds are normal.      Palpations: Abdomen is soft.      Tenderness: There is abdominal tenderness. There is no right CVA tenderness, left  CVA tenderness, guarding or rebound.      Hernia: No hernia is present.      Comments: Tender left lower quadrant without guarding rebound or tympany.  No palpable masses.  No evidence of hernia.  Midline scar   Musculoskeletal:         General: Normal range of motion.      Cervical back: Normal range of motion and neck supple.   Lymphadenopathy:      Cervical: No cervical adenopathy.   Skin:     General: Skin is warm and dry.      Findings: No erythema.   Neurological:      Mental Status: She is alert and oriented to person, place, and time.   Psychiatric:         Mood and Affect: Mood normal.         Behavior: Behavior normal.         Thought Content: Thought content normal.         Judgment: Judgment normal.           Assessment:   Marlo Barney has severe obesity with multiple co-morbidities who would like to transfer her bariatric care to us and participate in our bariatric program.      Encounter Diagnoses   Name Primary?    Obesity, Class III, BMI 40-49.9 (morbid obesity) Yes    History of Shashank-en-Y gastric bypass     Left lower quadrant abdominal pain     Functional diarrhea     Dumping syndrome     Diabetes mellitus type 2 in obese     Essential hypertension     Gastroesophageal reflux disease, unspecified whether esophagitis present          Discussion/Summary/Plan:     58-year-old female status post open Shashank-en-Y gastric bypass 1986 by unknown surgeon here in [facility] who started having explosive diarrhea after certain foods that is intermittent in nature about 8 months ago.  She has had extensive workup with upper and lower endoscopy as well as CT scan of the abdomen and seeing gastroenterologist specialist.  Everything is turned up negative.  We went through her daily routine and she is eating quite a bit of sugar as well as drinking regular sodas.  She is eating a lot of sugar snacks as well as some greasy fatty foods at times.  She is not frying her foods and does  not eat much fried foods.  We had a long discussion regarding clean eating concentrating on lean protein and vegetables and fruits.  Discussed meals only and no snacking and making sure that does not do any sure whether drinking or eating.  I explained to her about dumping syndrome and also about irritable bowel syndrome.  I started her on Metamucil to help bulk up her stools and to help her colon to settle down.  She will clean up her diet and stop all sugar.  I instructed her about bariatric vitamins and what she needs with her history of gastric bypass that she would need to do different vitamins and not absorbing her p.o. B12 and have to be either sublingual or injection.  She will do these things and then follow-up as needed.    Recommended patient be sure to eat at least three meals per day all with high lean protein, vegetables and fruit. Be sure to limit/cut back on daily simple carbohydrate intake. Discussed with the patient the recommended amount of water per day to intake. Reviewed vitamin requirements. Be sure to do routine exercise including both cardio and strength training. Recommended patient to see our dietician to go into more detail regarding diet changes.    Instructions / Recommendations: dietary counseling recommended, recommended a daily protein intake of  grams, vitamin supplements recommended, recommended exercising at least 150 minutes per week, behavior modifications recommended and instructed to call the office for concerns, questions, or problems.    The patient was instructed to follow up in as needed.     The patient was counseled regarding diet, exercise and the surgical procedures available. Dietician appointment was recommended as well.  Total time of encounter was over 45 minutes counseling the patient and going over the procedure.  Dietary changes as well as exercise were also discussed.  Time was also spent before the encounter to review their history and any documentation  provided..

## 2024-01-15 DIAGNOSIS — E78.2 MIXED HYPERLIPIDEMIA: ICD-10-CM

## 2024-01-15 DIAGNOSIS — F41.9 ANXIETY: ICD-10-CM

## 2024-01-15 DIAGNOSIS — E11.65 TYPE 2 DIABETES MELLITUS WITH HYPERGLYCEMIA, WITHOUT LONG-TERM CURRENT USE OF INSULIN: ICD-10-CM

## 2024-01-15 DIAGNOSIS — I10 ESSENTIAL HYPERTENSION: ICD-10-CM

## 2024-01-15 RX ORDER — METFORMIN HYDROCHLORIDE 500 MG/1
TABLET, EXTENDED RELEASE ORAL
Qty: 360 TABLET | Refills: 1 | Status: SHIPPED | OUTPATIENT
Start: 2024-01-15

## 2024-01-15 RX ORDER — AMLODIPINE BESYLATE 10 MG/1
TABLET ORAL
Qty: 90 TABLET | Refills: 1 | Status: SHIPPED | OUTPATIENT
Start: 2024-01-15

## 2024-01-15 RX ORDER — BUSPIRONE HYDROCHLORIDE 5 MG/1
5 TABLET ORAL 3 TIMES DAILY
Qty: 270 TABLET | Refills: 1 | Status: SHIPPED | OUTPATIENT
Start: 2024-01-15

## 2024-01-15 RX ORDER — CARVEDILOL 12.5 MG/1
12.5 TABLET ORAL 2 TIMES DAILY WITH MEALS
Qty: 180 TABLET | Refills: 1 | Status: SHIPPED | OUTPATIENT
Start: 2024-01-15

## 2024-01-15 RX ORDER — ATORVASTATIN CALCIUM 10 MG/1
TABLET, FILM COATED ORAL
Qty: 90 TABLET | Refills: 1 | Status: SHIPPED | OUTPATIENT
Start: 2024-01-15

## 2024-01-15 RX ORDER — BENAZEPRIL HYDROCHLORIDE 40 MG/1
TABLET ORAL
Qty: 90 TABLET | Refills: 1 | Status: SHIPPED | OUTPATIENT
Start: 2024-01-15

## 2024-02-01 ENCOUNTER — OFFICE VISIT (OUTPATIENT)
Dept: FAMILY MEDICINE CLINIC | Facility: CLINIC | Age: 59
End: 2024-02-01
Payer: COMMERCIAL

## 2024-02-01 ENCOUNTER — LAB (OUTPATIENT)
Dept: LAB | Facility: HOSPITAL | Age: 59
End: 2024-02-01
Payer: COMMERCIAL

## 2024-02-01 VITALS
HEIGHT: 63 IN | RESPIRATION RATE: 16 BRPM | BODY MASS INDEX: 44.3 KG/M2 | TEMPERATURE: 96.9 F | DIASTOLIC BLOOD PRESSURE: 84 MMHG | WEIGHT: 250 LBS | SYSTOLIC BLOOD PRESSURE: 124 MMHG | OXYGEN SATURATION: 97 % | HEART RATE: 83 BPM

## 2024-02-01 DIAGNOSIS — K91.1 DUMPING SYNDROME: ICD-10-CM

## 2024-02-01 DIAGNOSIS — G89.29 CHRONIC BACK PAIN, UNSPECIFIED BACK LOCATION, UNSPECIFIED BACK PAIN LATERALITY: ICD-10-CM

## 2024-02-01 DIAGNOSIS — K59.1 FUNCTIONAL DIARRHEA: ICD-10-CM

## 2024-02-01 DIAGNOSIS — E11.65 TYPE 2 DIABETES MELLITUS WITH HYPERGLYCEMIA, WITHOUT LONG-TERM CURRENT USE OF INSULIN: ICD-10-CM

## 2024-02-01 DIAGNOSIS — M54.9 CHRONIC BACK PAIN, UNSPECIFIED BACK LOCATION, UNSPECIFIED BACK PAIN LATERALITY: ICD-10-CM

## 2024-02-01 DIAGNOSIS — Z51.81 MEDICATION MONITORING ENCOUNTER: Primary | ICD-10-CM

## 2024-02-01 LAB
ALBUMIN SERPL-MCNC: 4.3 G/DL (ref 3.5–5.2)
ALBUMIN UR-MCNC: <1.2 MG/DL
ALBUMIN/GLOB SERPL: 2 G/DL
ALP SERPL-CCNC: 39 U/L (ref 39–117)
ALT SERPL W P-5'-P-CCNC: 8 U/L (ref 1–33)
AMPHET+METHAMPHET UR QL: NEGATIVE
AMPHETAMINE INTERNAL CONTROL: NORMAL
AMPHETAMINES UR QL: NEGATIVE
ANION GAP SERPL CALCULATED.3IONS-SCNC: 12 MMOL/L (ref 5–15)
AST SERPL-CCNC: 9 U/L (ref 1–32)
BACTERIA UR QL AUTO: ABNORMAL /HPF
BARBITURATE INTERNAL CONTROL: NORMAL
BARBITURATES UR QL SCN: NEGATIVE
BASOPHILS # BLD AUTO: 0.04 10*3/MM3 (ref 0–0.2)
BASOPHILS NFR BLD AUTO: 0.6 % (ref 0–1.5)
BENZODIAZ UR QL SCN: NEGATIVE
BENZODIAZEPINE INTERNAL CONTROL: NORMAL
BILIRUB SERPL-MCNC: 0.4 MG/DL (ref 0–1.2)
BILIRUB UR QL STRIP: NEGATIVE
BUN SERPL-MCNC: 10 MG/DL (ref 6–20)
BUN/CREAT SERPL: 16.4 (ref 7–25)
BUPRENORPHINE INTERNAL CONTROL: NORMAL
BUPRENORPHINE SERPL-MCNC: NEGATIVE NG/ML
CALCIUM SPEC-SCNC: 9.1 MG/DL (ref 8.6–10.5)
CANNABINOIDS SERPL QL: NEGATIVE
CHLORIDE SERPL-SCNC: 102 MMOL/L (ref 98–107)
CHOLEST SERPL-MCNC: 102 MG/DL (ref 0–200)
CLARITY UR: CLEAR
CO2 SERPL-SCNC: 25 MMOL/L (ref 22–29)
COCAINE INTERNAL CONTROL: NORMAL
COCAINE UR QL: NEGATIVE
COLOR UR: YELLOW
CREAT SERPL-MCNC: 0.61 MG/DL (ref 0.57–1)
CREAT UR-MCNC: 43 MG/DL
DEPRECATED RDW RBC AUTO: 40.1 FL (ref 37–54)
EGFRCR SERPLBLD CKD-EPI 2021: 105.1 ML/MIN/1.73
EOSINOPHIL # BLD AUTO: 0.15 10*3/MM3 (ref 0–0.4)
EOSINOPHIL NFR BLD AUTO: 2.3 % (ref 0.3–6.2)
ERYTHROCYTE [DISTWIDTH] IN BLOOD BY AUTOMATED COUNT: 12.4 % (ref 12.3–15.4)
EXPIRATION DATE: NORMAL
GLOBULIN UR ELPH-MCNC: 2.2 GM/DL
GLUCOSE SERPL-MCNC: 114 MG/DL (ref 65–99)
GLUCOSE UR STRIP-MCNC: NEGATIVE MG/DL
HBA1C MFR BLD: 7 % (ref 4.8–5.6)
HCT VFR BLD AUTO: 36.4 % (ref 34–46.6)
HDLC SERPL-MCNC: 28 MG/DL (ref 40–60)
HGB BLD-MCNC: 12.1 G/DL (ref 12–15.9)
HGB UR QL STRIP.AUTO: NEGATIVE
HOLD SPECIMEN: NORMAL
HYALINE CASTS UR QL AUTO: ABNORMAL /LPF
IMM GRANULOCYTES # BLD AUTO: 0.01 10*3/MM3 (ref 0–0.05)
IMM GRANULOCYTES NFR BLD AUTO: 0.2 % (ref 0–0.5)
KETONES UR QL STRIP: NEGATIVE
LDLC SERPL CALC-MCNC: 46 MG/DL (ref 0–100)
LDLC/HDLC SERPL: 1.48 {RATIO}
LEUKOCYTE ESTERASE UR QL STRIP.AUTO: ABNORMAL
LYMPHOCYTES # BLD AUTO: 1.77 10*3/MM3 (ref 0.7–3.1)
LYMPHOCYTES NFR BLD AUTO: 26.6 % (ref 19.6–45.3)
Lab: NORMAL
MCH RBC QN AUTO: 29.4 PG (ref 26.6–33)
MCHC RBC AUTO-ENTMCNC: 33.2 G/DL (ref 31.5–35.7)
MCV RBC AUTO: 88.3 FL (ref 79–97)
MDMA (ECSTASY) INTERNAL CONTROL: NORMAL
MDMA UR QL SCN: NEGATIVE
METHADONE INTERNAL CONTROL: NORMAL
METHADONE UR QL SCN: NEGATIVE
METHAMPHETAMINE INTERNAL CONTROL: NORMAL
MICROALBUMIN/CREAT UR: NORMAL MG/G{CREAT}
MONOCYTES # BLD AUTO: 0.34 10*3/MM3 (ref 0.1–0.9)
MONOCYTES NFR BLD AUTO: 5.1 % (ref 5–12)
MORPHINE INTERNAL CONTROL: NORMAL
MORPHINE/OPIATES SCREEN, URINE: NEGATIVE
NEUTROPHILS NFR BLD AUTO: 4.34 10*3/MM3 (ref 1.7–7)
NEUTROPHILS NFR BLD AUTO: 65.2 % (ref 42.7–76)
NITRITE UR QL STRIP: NEGATIVE
NRBC BLD AUTO-RTO: 0 /100 WBC (ref 0–0.2)
OXYCODONE INTERNAL CONTROL: NORMAL
OXYCODONE UR QL SCN: NEGATIVE
PCP UR QL SCN: NEGATIVE
PH UR STRIP.AUTO: 5.5 [PH] (ref 5–8)
PHENCYCLIDINE INTERNAL CONTROL: NORMAL
PLATELET # BLD AUTO: 306 10*3/MM3 (ref 140–450)
PMV BLD AUTO: 10.7 FL (ref 6–12)
POTASSIUM SERPL-SCNC: 4.4 MMOL/L (ref 3.5–5.2)
PROT SERPL-MCNC: 6.5 G/DL (ref 6–8.5)
PROT UR QL STRIP: NEGATIVE
RBC # BLD AUTO: 4.12 10*6/MM3 (ref 3.77–5.28)
RBC # UR STRIP: ABNORMAL /HPF
REF LAB TEST METHOD: ABNORMAL
SODIUM SERPL-SCNC: 139 MMOL/L (ref 136–145)
SP GR UR STRIP: 1.01 (ref 1–1.03)
SQUAMOUS #/AREA URNS HPF: ABNORMAL /HPF
THC INTERNAL CONTROL: NORMAL
TRIGL SERPL-MCNC: 163 MG/DL (ref 0–150)
UROBILINOGEN UR QL STRIP: ABNORMAL
VLDLC SERPL-MCNC: 28 MG/DL (ref 5–40)
WBC # UR STRIP: ABNORMAL /HPF
WBC NRBC COR # BLD AUTO: 6.65 10*3/MM3 (ref 3.4–10.8)

## 2024-02-01 PROCEDURE — 82570 ASSAY OF URINE CREATININE: CPT

## 2024-02-01 PROCEDURE — 85025 COMPLETE CBC W/AUTO DIFF WBC: CPT

## 2024-02-01 PROCEDURE — 87086 URINE CULTURE/COLONY COUNT: CPT

## 2024-02-01 PROCEDURE — 81001 URINALYSIS AUTO W/SCOPE: CPT

## 2024-02-01 PROCEDURE — 36415 COLL VENOUS BLD VENIPUNCTURE: CPT

## 2024-02-01 PROCEDURE — 83036 HEMOGLOBIN GLYCOSYLATED A1C: CPT

## 2024-02-01 PROCEDURE — 80061 LIPID PANEL: CPT

## 2024-02-01 PROCEDURE — 80053 COMPREHEN METABOLIC PANEL: CPT

## 2024-02-01 PROCEDURE — 82043 UR ALBUMIN QUANTITATIVE: CPT

## 2024-02-01 RX ORDER — TRAMADOL HYDROCHLORIDE 50 MG/1
50 TABLET ORAL EVERY 6 HOURS PRN
Qty: 90 TABLET | Refills: 0 | Status: CANCELLED | OUTPATIENT
Start: 2024-02-01

## 2024-02-01 RX ORDER — AMOXICILLIN 250 MG
CAPSULE ORAL
COMMUNITY
Start: 2024-01-12

## 2024-02-01 NOTE — PROGRESS NOTES
Answers submitted by the patient for this visit:  Other (Submitted on 1/25/2024)  Please describe your symptoms.: Follow up of health issues and forms for leave & short/long term disability  Have you had these symptoms before?: Yes  How long have you been having these symptoms?: Greater than 2 weeks  Primary Reason for Visit (Submitted on 1/25/2024)  What is the primary reason for your visit?: Other  Chief Complaint  Abdominal Pain, Back Pain, Shoulder Pain, and knot on left hand 2nd digit    Subjective        Marlo Barney presents to [facility]  History of Present Illness  Shoulder pain:  Seein dr Coppola for with wrist pain possible carpal tunnel     and has dumping syndrome and they have encouraged her to change her diet.  At [facility].  Has had upper and lower scopes and is referred to Gastro for dumping syndrome.  Just started on new diet for abdominal issues but doesn't see much results yet.  Abdominal Pain  Pertinent negatives include no fever.   Back Pain  Associated symptoms include abdominal pain. Pertinent negatives include no chest pain, fever, numbness or weakness.       The following portions of the patient's history were personally reviewed and updated as appropriate: allergies, current medications, past medical history, past surgical history, past family history, and past social history.     Body mass index is 43.75 kg/m².           Past History:    Medical History: has a past medical history of Anxiety, Arthritis, Depression, Diabetes mellitus, Diverticulitis, GERD (gastroesophageal reflux disease), Headache, Hyperlipidemia, and Hypertension.     Surgical History: has a past surgical history that includes Tonsillectomy; Gastric bypass (1986); Endometrial ablation; and Hernia repair.     Family History: family history includes COPD in her father; Cerebral aneurysm in her paternal grandfather; Diabetes in her maternal aunt, maternal grandmother, mother, and  paternal aunt; Heart disease in her father, maternal grandmother, mother, and paternal grandfather; Hyperlipidemia in her mother; Hypertension in her mother; Leukemia in her maternal grandfather; Myelodysplastic syndrome in her paternal grandmother; Stroke in her maternal grandfather; Thyroid disease in her paternal grandmother.     Social History: reports that she quit smoking about 36 years ago. Her smoking use included cigarettes. She started smoking about 37 years ago. She has a 0.25 pack-year smoking history. She has been exposed to tobacco smoke. She has never used smokeless tobacco. She reports current alcohol use. She reports that she does not use drugs.    Allergies: Patient has no known allergies.          Current Outpatient Medications:     acetaminophen (TYLENOL) 650 MG 8 hr tablet, Tylenol Arthritis Pain 650 mg oral tablet extended release take 2 tablets by oral route every 8 hours   Active, Disp: , Rfl:     amLODIPine (NORVASC) 10 MG tablet, TAKE 1 TABLET BY MOUTH EVERY DAY, Disp: 90 tablet, Rfl: 1    aspirin 81 MG EC tablet, aspirin 81 mg oral tablet,delayed release (DR/EC) take 1 tablet (81 mg) by oral route once daily   Active, Disp: , Rfl:     atorvastatin (LIPITOR) 10 MG tablet, TAKE 1 TABLET BY MOUTH EVERYDAY AT BEDTIME, Disp: 90 tablet, Rfl: 1    benazepril (LOTENSIN) 40 MG tablet, TAKE 1 TABLET BY MOUTH EVERY DAY, Disp: 90 tablet, Rfl: 1    busPIRone (BUSPAR) 5 MG tablet, TAKE 1 TABLET BY MOUTH THREE TIMES A DAY, Disp: 270 tablet, Rfl: 1    Calcium Carbonate (Calcium 600) 1500 (600 Ca) MG tablet, , Disp: , Rfl:     carvedilol (COREG) 12.5 MG tablet, TAKE 1 TABLET BY MOUTH TWICE A DAY WITH FOOD, Disp: 180 tablet, Rfl: 1    Cholecalciferol 50 MCG (2000 UT) capsule, Vitamin D3 50 mcg (2,000 unit) oral capsule take 1 capsule by oral route daily   Active, Disp: , Rfl:     Cobalamin Combinations (B-12) 100-5000 MCG sublingual tablet, , Disp: , Rfl:     docusate sodium (COLACE) 50 MG capsule, Take 1  capsule by mouth., Disp: , Rfl:     etodolac XL (LODINE XL) 400 MG 24 hr tablet, Take 1 tablet by mouth Daily., Disp: 90 tablet, Rfl: 1    famotidine (PEPCID) 40 MG tablet, Take 1 tablet by mouth 2 (Two) Times a Day., Disp: 180 tablet, Rfl: 1    Iron, Ferrous Sulfate, 325 (65 Fe) MG tablet, , Disp: , Rfl:     lidocaine (LIDODERM) 5 %, PLACE 1 PATCH ON THE SKIN AS DIRECTED BY PROVIDER DAILY. REMOVE & DISCARD PATCH WITHIN 12 HOURS OR AS DIRECTED BY MD, Disp: 90 patch, Rfl: 1    metFORMIN ER (GLUCOPHAGE-XR) 500 MG 24 hr tablet, TAKE 2 TABLETS BY MOUTH TWICE A DAY, Disp: 360 tablet, Rfl: 1    Multiple Vitamins-Minerals (MULTIVIT/MULTIMINERAL ADULT PO), , Disp: , Rfl:     ondansetron (Zofran) 4 MG tablet, Take 1 tablet by mouth Every 8 (Eight) Hours As Needed for Nausea or Vomiting., Disp: 20 tablet, Rfl: 2    pantoprazole (PROTONIX) 40 MG EC tablet, Take 1 tablet by mouth Daily., Disp: 90 tablet, Rfl: 1    Psyllium 0.36 g capsule, , Disp: , Rfl:     spironolactone (ALDACTONE) 25 MG tablet, TAKE 1 TABLET BY MOUTH DAILY ON MONDAY, WEDNESDAY, AND FRIDAY., Disp: 90 tablet, Rfl: 1    traMADol (ULTRAM) 50 MG tablet, TAKE 1 TABLET BY MOUTH EVERY 6 HOURS AS NEEDED FOR MODERATE PAIN, Disp: 90 tablet, Rfl: 0    Medications Discontinued During This Encounter   Medication Reason    cyanocobalamin (VITAMIN B-12) 50 MCG tablet *Therapy completed         Review of Systems   Constitutional:  Negative for fever.   Respiratory:  Negative for cough and shortness of breath.    Cardiovascular:  Negative for chest pain, palpitations and leg swelling.   Gastrointestinal:  Positive for abdominal pain.   Musculoskeletal:  Positive for back pain.   Neurological:  Negative for dizziness, weakness, numbness and headache.        Objective         Vitals:    02/01/24 1256   BP: 124/84   BP Location: Right arm   Patient Position: Sitting   Cuff Size: Large Adult   Pulse: 83   Resp: 16   Temp: 96.9 °F (36.1 °C)   TempSrc: Temporal   SpO2: 97%  "  Weight: 113 kg (250 lb)   Height: 161 cm (63.39\")     Body mass index is 43.75 kg/m².         Physical Exam  Vitals reviewed.   Constitutional:       Appearance: Normal appearance. She is well-developed.   HENT:      Head: Normocephalic and atraumatic.      Mouth/Throat:      Pharynx: No oropharyngeal exudate.   Eyes:      Conjunctiva/sclera: Conjunctivae normal.      Pupils: Pupils are equal, round, and reactive to light.   Cardiovascular:      Rate and Rhythm: Normal rate and regular rhythm.      Heart sounds: Normal heart sounds. No murmur heard.     No friction rub. No gallop.   Pulmonary:      Effort: Pulmonary effort is normal.      Breath sounds: Normal breath sounds. No wheezing or rhonchi.   Skin:     General: Skin is warm and dry.   Neurological:      Mental Status: She is alert and oriented to person, place, and time.   Psychiatric:         Mood and Affect: Mood and affect normal.         Behavior: Behavior normal.         Thought Content: Thought content normal.         Judgment: Judgment normal.             Result Review :               Assessment and Plan     Diagnoses and all orders for this visit:    1. Medication monitoring encounter (Primary)  -     POC Medline 12 Panel Urine Drug Screen    2. Chronic back pain, unspecified back location, unspecified back pain laterality    3. Dumping syndrome  -     Ambulatory Referral to Gastroenterology    4. Functional diarrhea  -     Ambulatory Referral to Gastroenterology          30 minutes spent with patient and filling out paperwork with evaluation and discussion of chronic conditions.    Follow Up     Return in about 2 months (around 4/1/2024).    Patient was given instructions and counseling regarding her condition or for health maintenance advice. Please see specific information pulled into the AVS if appropriate.         "

## 2024-02-02 ENCOUNTER — HOSPITAL ENCOUNTER (OUTPATIENT)
Facility: HOSPITAL | Age: 59
Discharge: HOME | End: 2024-02-02
Payer: COMMERCIAL

## 2024-02-02 DIAGNOSIS — R20.2 PARESTHESIA OF HAND, BILATERAL: ICD-10-CM

## 2024-02-02 DIAGNOSIS — M54.9 CHRONIC BACK PAIN, UNSPECIFIED BACK LOCATION, UNSPECIFIED BACK PAIN LATERALITY: ICD-10-CM

## 2024-02-02 DIAGNOSIS — G89.29 CHRONIC BACK PAIN, UNSPECIFIED BACK LOCATION, UNSPECIFIED BACK PAIN LATERALITY: ICD-10-CM

## 2024-02-02 DIAGNOSIS — M48.02 CERVICAL SPINAL STENOSIS: ICD-10-CM

## 2024-02-02 DIAGNOSIS — M75.42 IMPINGEMENT SYNDROME OF LEFT SHOULDER: ICD-10-CM

## 2024-02-02 DIAGNOSIS — M75.41 IMPINGEMENT SYNDROME OF RIGHT SHOULDER: ICD-10-CM

## 2024-02-02 LAB — BACTERIA SPEC AEROBE CULT: NO GROWTH

## 2024-02-02 PROCEDURE — 95886 MUSC TEST DONE W/N TEST COMP: CPT

## 2024-02-02 PROCEDURE — 95911 NRV CNDJ TEST 9-10 STUDIES: CPT

## 2024-02-02 RX ORDER — TRAMADOL HYDROCHLORIDE 50 MG/1
50 TABLET ORAL EVERY 8 HOURS PRN
Qty: 90 TABLET | Refills: 0 | Status: SHIPPED | OUTPATIENT
Start: 2024-02-02

## 2024-02-04 DIAGNOSIS — K21.9 GASTROESOPHAGEAL REFLUX DISEASE WITHOUT ESOPHAGITIS: ICD-10-CM

## 2024-02-04 DIAGNOSIS — G89.29 CHRONIC LOW BACK PAIN, UNSPECIFIED BACK PAIN LATERALITY, UNSPECIFIED WHETHER SCIATICA PRESENT: ICD-10-CM

## 2024-02-04 DIAGNOSIS — M54.2 CERVICALGIA: ICD-10-CM

## 2024-02-04 DIAGNOSIS — M54.50 CHRONIC LOW BACK PAIN, UNSPECIFIED BACK PAIN LATERALITY, UNSPECIFIED WHETHER SCIATICA PRESENT: ICD-10-CM

## 2024-02-05 RX ORDER — FAMOTIDINE 40 MG/1
40 TABLET, FILM COATED ORAL 2 TIMES DAILY
Qty: 180 TABLET | Refills: 1 | Status: SHIPPED | OUTPATIENT
Start: 2024-02-05

## 2024-02-05 RX ORDER — ETODOLAC 400 MG/1
400 TABLET, EXTENDED RELEASE ORAL DAILY
Qty: 90 TABLET | Refills: 1 | Status: SHIPPED | OUTPATIENT
Start: 2024-02-05

## 2024-02-09 ENCOUNTER — TELEPHONE (OUTPATIENT)
Dept: ORTHOPEDIC SURGERY | Facility: CLINIC | Age: 59
End: 2024-02-09

## 2024-02-09 ENCOUNTER — OFFICE VISIT (OUTPATIENT)
Dept: ORTHOPEDIC SURGERY | Facility: CLINIC | Age: 59
End: 2024-02-09
Payer: COMMERCIAL

## 2024-02-09 VITALS
BODY MASS INDEX: 44.3 KG/M2 | SYSTOLIC BLOOD PRESSURE: 106 MMHG | HEART RATE: 61 BPM | WEIGHT: 250 LBS | HEIGHT: 63 IN | OXYGEN SATURATION: 95 % | DIASTOLIC BLOOD PRESSURE: 70 MMHG

## 2024-02-09 DIAGNOSIS — G56.03 CARPAL TUNNEL SYNDROME, BILATERAL: ICD-10-CM

## 2024-02-09 DIAGNOSIS — M75.81 TENDINITIS OF RIGHT ROTATOR CUFF: Primary | ICD-10-CM

## 2024-02-09 DIAGNOSIS — M75.82 ROTATOR CUFF TENDONITIS, LEFT: ICD-10-CM

## 2024-02-09 RX ORDER — LIDOCAINE HYDROCHLORIDE 10 MG/ML
1 INJECTION, SOLUTION INFILTRATION; PERINEURAL
Status: COMPLETED | OUTPATIENT
Start: 2024-02-09 | End: 2024-02-09

## 2024-02-09 RX ORDER — TRIAMCINOLONE ACETONIDE 40 MG/ML
40 INJECTION, SUSPENSION INTRA-ARTICULAR; INTRAMUSCULAR
Status: COMPLETED | OUTPATIENT
Start: 2024-02-09 | End: 2024-02-09

## 2024-02-09 RX ADMIN — TRIAMCINOLONE ACETONIDE 40 MG: 40 INJECTION, SUSPENSION INTRA-ARTICULAR; INTRAMUSCULAR at 13:10

## 2024-02-09 RX ADMIN — LIDOCAINE HYDROCHLORIDE 1 ML: 10 INJECTION, SOLUTION INFILTRATION; PERINEURAL at 13:10

## 2024-02-09 RX ADMIN — LIDOCAINE HYDROCHLORIDE 1 ML: 10 INJECTION, SOLUTION INFILTRATION; PERINEURAL at 13:09

## 2024-02-09 RX ADMIN — TRIAMCINOLONE ACETONIDE 40 MG: 40 INJECTION, SUSPENSION INTRA-ARTICULAR; INTRAMUSCULAR at 13:09

## 2024-02-09 NOTE — PROGRESS NOTES
Chief Complaint  Follow-up and Pain of the Right Shoulder, Follow-up and Pain of the Left Shoulder, Follow-up and Pain of the Right Hand, and Follow-up and Pain of the Left Hand    Subjective          Marlo Barney presents to [facility] for   History of Present Illness    The patient presents here today for follow up evaluation of the bilateral upper extremities.  The patient recently had an MRI of both shoulders and an emg of her bilateral hands. To review,  She has been treating her bilateral shoulder impingement conservatively. She had injections 3 months ago. She got about 2 months of relief with the left shoulder and only about 1 week for the right shoulder. She denies relief with the home exercises. She admits to numbness and tingling to her hands as well. She states she wears gloves. She does office type work.      No Known Allergies     Social History     Socioeconomic History    Marital status:    Tobacco Use    Smoking status: Former     Packs/day: 0.25     Years: 1.00     Additional pack years: 0.00     Total pack years: 0.25     Types: Cigarettes     Start date: 1986     Quit date: 1987     Years since quittin.5     Passive exposure: Past    Smokeless tobacco: Never   Vaping Use    Vaping Use: Never used   Substance and Sexual Activity    Alcohol use: Yes     Comment: occationally    Drug use: Never    Sexual activity: Not Currently     Partners: Male        I reviewed the patient's chief complaint, history of present illness, review of systems, past medical history, surgical history, family history, social history, medications, and allergy list.     REVIEW OF SYSTEMS    Constitutional: Denies fevers, chills, weight loss  Cardiovascular: Denies chest pain, shortness of breath  Skin: Denies rashes, acute skin changes  Neurologic: Denies headache, loss of consciousness  MSK: bilateral upper extremity pain      Objective   Vital Signs:   /70   " Pulse 61   Ht 160 cm (63\")   Wt 113 kg (250 lb)   SpO2 95%   BMI 44.29 kg/m²     Body mass index is 44.29 kg/m².    Physical Exam    General: Alert. No acute distress.   Bilateral upper extremity : Active elevation to 110 degrees, external rotation of 45 degrees, internal rotation to the back pocket.  5 out of 5 with rotator cuff testing with no pain.  Positive impingement.  Negative speeds.  Negative Graves's.  Neurovascular intact. Positive tinel and compression testing at the wrists.      Medium Joint Arthrocentesis  Date/Time: 2/9/2024 1:09 PM  Consent given by: patient  Site marked: site marked  Timeout: Immediately prior to procedure a time out was called to verify the correct patient, procedure, equipment, support staff and site/side marked as required   Supporting Documentation  Indications: pain   Procedure Details  Location: wrist (right) -   Needle size: 23 G  Medications administered: 40 mg triamcinolone acetonide 40 MG/ML; 1 mL lidocaine 1 %  Patient tolerance: patient tolerated the procedure well with no immediate complications      Medium Joint Arthrocentesis  Date/Time: 2/9/2024 1:10 PM  Consent given by: patient  Site marked: site marked  Timeout: Immediately prior to procedure a time out was called to verify the correct patient, procedure, equipment, support staff and site/side marked as required   Supporting Documentation  Indications: pain   Procedure Details  Location: wrist (left) -   Needle size: 23 G  Medications administered: 1 mL lidocaine 1 %; 40 mg triamcinolone acetonide 40 MG/ML  Patient tolerance: patient tolerated the procedure well with no immediate complications      EMG- Evidence of a mild to moderate, demyelinating, focal neuropathy of the left median nerve at the wrist (carpal tunnel). 2. Evidence of a mild, demyelinating, focal neuropathy of the right median nerve at the wrist (carpal tunnel). 3. No electrophysiologic evidence of a cervical radiculopathy, brachial " plexopathy, additional focal neuropathy, polyneuropathic changes, myopathy, or motor neuron pathology at this time.    MRI Right shoulder- IMPRESSION: Mild-to-moderate pre insertional supraspinatus tendinosis without tear. Mild AC joint arthropathy with minimal periarticular inflammation    MRI Left shoulder- IMPRESSION: Mild supraspinatus tendinosis possibly acute on chronic given attenuation of the supraspinatus. No tear.  Mild AC joint arthropathy with minimal periarticular inflammation    Imaging Results (Most Recent)       None                     Assessment and Plan        EMG & Nerve Conduction Test    Result Date: 2/2/2024  Narrative: See attached EMG/NCS report. Electronically signed by Avery Mchugh PT, 02/02/24, 3:10 PM EST.       Diagnoses and all orders for this visit:    1. Tendinitis of right rotator cuff (Primary)    2. Rotator cuff tendonitis, left    3. Carpal tunnel syndrome, bilateral        Discussed the treatment plan with the patient.  I reviewed the MRI and EMG with the patient. Plan for conservative treatment at this time. The patient declined physical therapy at this time. Home exercises given today. Carpal tunnel braces given today. Discussed the risks and benefits of bilateral carpal tunnel injections, The patient expressed understanding and wished to proceed. She tolerated the injections well.     Call or return if worsening symptoms.    Scribed for Tereso Coppola MD by Freda Crain  02/09/2024   12:51 EST         Follow Up       PRN    Patient was given instructions and counseling regarding her condition or for health maintenance advice. Please see specific information pulled into the AVS if appropriate.       I have personally performed the services described in this document as scribed by the above individual and it is both accurate and complete.     Tereso Coppola MD  02/09/24  13:04 EST

## 2024-02-09 NOTE — TELEPHONE ENCOUNTER
PATIENT CALLED BACK - I LET HER KNOW SHE IS ABLE TO  THE BRACES AND EXERCISES MONDAY, 02.19.24 AND THAT OUR BUSINESS HOURS ARE MON-FRI 8:00AM - 11:00AM AND 1:00PM - 4:00PM    PATIENT STATED SHE LIVES 45 MINUTES AWAY, AND THIS IS VERY INCONVENIENT AS THIS SHOULD HAVE BEEN HANDLED AT HER APPOINTMENT - THEREFORE SHE WILL COME IN SOMETIME NEXT WEEK

## 2024-02-09 NOTE — TELEPHONE ENCOUNTER
Caller: RENATA   Relationship to Patient: SELF  Phone Number: [phone] (home)    Reason for Call: PATIENT ASKING QUESTIONS ABOUT HER APPT NOTES STATING THAT SHE RECEIVED EXERCISES TODAY AS WELL AS [employer] BRACES FOR HER CARPAL TUNNEL. SHE STATES SHE DID NOT RECEIVE THE EXERCISES OR THE BRACES.

## 2024-02-09 NOTE — TELEPHONE ENCOUNTER
LVM for patient to stop by [address] office Monday and we can get the carpal tunnel braces and home excersises.

## 2024-02-29 DIAGNOSIS — G89.29 CHRONIC BACK PAIN, UNSPECIFIED BACK LOCATION, UNSPECIFIED BACK PAIN LATERALITY: ICD-10-CM

## 2024-02-29 DIAGNOSIS — M54.9 CHRONIC BACK PAIN, UNSPECIFIED BACK LOCATION, UNSPECIFIED BACK PAIN LATERALITY: ICD-10-CM

## 2024-02-29 DIAGNOSIS — R11.0 NAUSEA: ICD-10-CM

## 2024-02-29 RX ORDER — ONDANSETRON 4 MG/1
TABLET, FILM COATED ORAL
Qty: 18 TABLET | Refills: 3 | Status: SHIPPED | OUTPATIENT
Start: 2024-02-29

## 2024-02-29 RX ORDER — TRAMADOL HYDROCHLORIDE 50 MG/1
50 TABLET ORAL EVERY 8 HOURS PRN
Qty: 90 TABLET | Refills: 0 | Status: SHIPPED | OUTPATIENT
Start: 2024-02-29

## 2024-03-01 ENCOUNTER — TELEPHONE (OUTPATIENT)
Dept: FAMILY MEDICINE CLINIC | Facility: CLINIC | Age: 59
End: 2024-03-01
Payer: COMMERCIAL

## 2024-03-01 NOTE — TELEPHONE ENCOUNTER
Caller: PRUDENTIAL    Relationship to patient: Other    Best call back number: [phone]    Patient is needing: [facility] CALLED IN AND SAID THEY HAD FAXED A COVER SHEET AND TWO PAGE LETTER TO OFFICE ON 2/22/2024 AND 2/23/2024 AND ARE RE-FAXING DOCUMENT AND WOULD LIKE A CALL BACK WHEN THAT LETTER IS RECEIVED

## 2024-03-04 ENCOUNTER — TELEPHONE (OUTPATIENT)
Dept: FAMILY MEDICINE CLINIC | Facility: CLINIC | Age: 59
End: 2024-03-04
Payer: COMMERCIAL

## 2024-03-04 NOTE — TELEPHONE ENCOUNTER
Called Brittney and advised that fax had not come through to us and ask her to email documents to me

## 2024-03-04 NOTE — TELEPHONE ENCOUNTER
Caller: ALISTAIR     Relationship to patient: JF     Best call back number: [phone]     Patient is needing: JF CALLED IN AND SAID THEY HAD FAXED A COVER SHEET AND TWO PAGE LETTER TO OFFICE ON 2/22/2024 AND 2/23/2024 AND ARE RE-FAXING DOCUMENT AND WOULD LIKE A CALL BACK WHEN THAT LETTER IS RECEIVED.  SHE ALSO STATED THAT THEY SENT THE FAX 2 TIMES ON 3/1/24 [phone]. THEY RECEIVED FAX CONFIRMATION.  REQUESTING A CALL BACK WHEN THIS IS RECEIVED OR NOT RECEIVED AND A CALL TODAY IF POSSIBLE. .

## 2024-03-05 ENCOUNTER — TELEPHONE (OUTPATIENT)
Dept: FAMILY MEDICINE CLINIC | Facility: CLINIC | Age: 59
End: 2024-03-05
Payer: COMMERCIAL

## 2024-03-05 NOTE — TELEPHONE ENCOUNTER
Tried reaching out to patient, bad connection.    Patient is scheduled to visit with Tung Arias on 4/1/24. Provider will be out of the office during this time. Office would like to reschedule patient to later date.     Hub to share and relay

## 2024-03-18 ENCOUNTER — TELEPHONE (OUTPATIENT)
Dept: FAMILY MEDICINE CLINIC | Facility: CLINIC | Age: 59
End: 2024-03-18
Payer: COMMERCIAL

## 2024-03-18 NOTE — TELEPHONE ENCOUNTER
Caller: Marlo Barney    Relationship to patient: Self    Best call back number: [phone]     Additional notes:PATIENT WOULD LIKE TO CHANGE UPCOMING APPOINTMENT ON  03/28 AT 1045 TO A VIDEO VISIT    IF KAREEM COULD CALL PATIENT AND LET KNOW IF THIS IS POSSIBLE - SHE WOULD LIKE TO ADDRESS A COUPLE OF ISSUES ABOUT THE VISIT AS WELL

## 2024-03-26 DIAGNOSIS — I10 ESSENTIAL HYPERTENSION: ICD-10-CM

## 2024-03-26 RX ORDER — SPIRONOLACTONE 25 MG/1
TABLET ORAL
Qty: 36 TABLET | Refills: 4 | Status: SHIPPED | OUTPATIENT
Start: 2024-03-26

## 2024-03-28 ENCOUNTER — TELEMEDICINE (OUTPATIENT)
Dept: FAMILY MEDICINE CLINIC | Facility: CLINIC | Age: 59
End: 2024-03-28
Payer: COMMERCIAL

## 2024-03-28 DIAGNOSIS — E66.9 DIABETES MELLITUS TYPE 2 IN OBESE: ICD-10-CM

## 2024-03-28 DIAGNOSIS — R10.84 GENERALIZED ABDOMINAL PAIN: Primary | ICD-10-CM

## 2024-03-28 DIAGNOSIS — M54.50 CHRONIC LOW BACK PAIN WITHOUT SCIATICA, UNSPECIFIED BACK PAIN LATERALITY: ICD-10-CM

## 2024-03-28 DIAGNOSIS — E11.69 DIABETES MELLITUS TYPE 2 IN OBESE: ICD-10-CM

## 2024-03-28 DIAGNOSIS — K91.1 DUMPING SYNDROME: ICD-10-CM

## 2024-03-28 DIAGNOSIS — G89.29 CHRONIC LOW BACK PAIN WITHOUT SCIATICA, UNSPECIFIED BACK PAIN LATERALITY: ICD-10-CM

## 2024-03-28 PROCEDURE — 99214 OFFICE O/P EST MOD 30 MIN: CPT | Performed by: NURSE PRACTITIONER

## 2024-03-28 RX ORDER — GLIPIZIDE 5 MG/1
5 TABLET ORAL
Qty: 180 TABLET | Refills: 1 | Status: SHIPPED | OUTPATIENT
Start: 2024-03-28

## 2024-03-28 NOTE — PROGRESS NOTES
Chief Complaint  Abdominal Pain, Back Pain, and Weight Loss (Pt stated she has lot weight)    Subjective        Marlo Barney presents to [facility]  History of Present Illness  Abdominal pain:  and metamucil helping but is still using bathroom a lit and is urgent.  States will be going 2-3 hours at [facility] when occurs.    Back pain:  still has been using lidocaine patches but will make her nauseated. Didn't go to pain management  due to finances.  States the tramadol and patches is managing but cautious with patches.    States has lost weight to 235 from 250.  Did start at 287.  And has slowly lost weight although good for health has not been intentional.  Has had CT of abdomen without cancer. And continues with the dumping syndrome.    Has stopped metformin in the past and didn't help  dumping syndrome.  Will start glipizide.       The following portions of the patient's history were personally reviewed and updated as appropriate: allergies, current medications, past medical history, past surgical history, past family history, and past social history.     There is no height or weight on file to calculate BMI.           Past History:    Medical History: has a past medical history of Anxiety, Arthritis, Depression, Diabetes mellitus, Diverticulitis, GERD (gastroesophageal reflux disease), Headache, Hyperlipidemia, and Hypertension.     Surgical History: has a past surgical history that includes Tonsillectomy; Gastric bypass (1986); Endometrial ablation; and Hernia repair.     Family History: family history includes COPD in her father; Cerebral aneurysm in her paternal grandfather; Diabetes in her maternal aunt, maternal grandmother, mother, and paternal aunt; Heart disease in her father, maternal grandmother, mother, and paternal grandfather; Hyperlipidemia in her mother; Hypertension in her mother; Leukemia in her maternal grandfather; Myelodysplastic syndrome in her paternal  grandmother; Stroke in her maternal grandfather; Thyroid disease in her paternal grandmother.     Social History: reports that she quit smoking about 36 years ago. Her smoking use included cigarettes. She started smoking about 37 years ago. She has a 0.3 pack-year smoking history. She has been exposed to tobacco smoke. She has never used smokeless tobacco. She reports current alcohol use. She reports that she does not use drugs.    Allergies: Patient has no known allergies.          Current Outpatient Medications:     acetaminophen (TYLENOL) 650 MG 8 hr tablet, Tylenol Arthritis Pain 650 mg oral tablet extended release take 2 tablets by oral route every 8 hours   Active, Disp: , Rfl:     amLODIPine (NORVASC) 10 MG tablet, TAKE 1 TABLET BY MOUTH EVERY DAY, Disp: 90 tablet, Rfl: 1    aspirin 81 MG EC tablet, aspirin 81 mg oral tablet,delayed release (DR/EC) take 1 tablet (81 mg) by oral route once daily   Active, Disp: , Rfl:     atorvastatin (LIPITOR) 10 MG tablet, TAKE 1 TABLET BY MOUTH EVERYDAY AT BEDTIME, Disp: 90 tablet, Rfl: 1    benazepril (LOTENSIN) 40 MG tablet, TAKE 1 TABLET BY MOUTH EVERY DAY, Disp: 90 tablet, Rfl: 1    busPIRone (BUSPAR) 5 MG tablet, TAKE 1 TABLET BY MOUTH THREE TIMES A DAY, Disp: 270 tablet, Rfl: 1    Calcium Carbonate (Calcium 600) 1500 (600 Ca) MG tablet, , Disp: , Rfl:     carvedilol (COREG) 12.5 MG tablet, TAKE 1 TABLET BY MOUTH TWICE A DAY WITH FOOD, Disp: 180 tablet, Rfl: 1    Cholecalciferol 50 MCG (2000 UT) capsule, Vitamin D3 50 mcg (2,000 unit) oral capsule take 1 capsule by oral route daily   Active, Disp: , Rfl:     Cobalamin Combinations (B-12) 100-5000 MCG sublingual tablet, , Disp: , Rfl:     famotidine (PEPCID) 40 MG tablet, TAKE 1 TABLET BY MOUTH TWICE A DAY, Disp: 180 tablet, Rfl: 1    Iron, Ferrous Sulfate, 325 (65 Fe) MG tablet, , Disp: , Rfl:     lidocaine (LIDODERM) 5 %, PLACE 1 PATCH ON THE SKIN AS DIRECTED BY PROVIDER DAILY. REMOVE & DISCARD PATCH WITHIN 12 HOURS  OR AS DIRECTED BY MD, Disp: 90 patch, Rfl: 1    metFORMIN ER (GLUCOPHAGE-XR) 500 MG 24 hr tablet, TAKE 2 TABLETS BY MOUTH TWICE A DAY, Disp: 360 tablet, Rfl: 1    Multiple Vitamins-Minerals (MULTIVIT/MULTIMINERAL ADULT PO), , Disp: , Rfl:     ondansetron (ZOFRAN) 4 MG tablet, TAKE 1 TABLET BY MOUTH EVERY 8 HOURS AS NEEDED FOR NAUSEA/VOMITING. (18 TABS/21 DAYS FOR INSURANCE), Disp: 18 tablet, Rfl: 3    pantoprazole (PROTONIX) 40 MG EC tablet, Take 1 tablet by mouth Daily., Disp: 90 tablet, Rfl: 1    Psyllium 0.36 g capsule, , Disp: , Rfl:     spironolactone (ALDACTONE) 25 MG tablet, TAKE 1 TABLET BY MOUTH DAILY. ON MONDAY,WED, AND FRIDAY, Disp: 36 tablet, Rfl: 4    traMADol (ULTRAM) 50 MG tablet, TAKE 1 TABLET BY MOUTH EVERY 8 HOURS AS NEEDED FOR MODERATE PAIN, Disp: 90 tablet, Rfl: 0    docusate sodium (COLACE) 50 MG capsule, Take 1 capsule by mouth., Disp: , Rfl:     etodolac XL (LODINE XL) 400 MG 24 hr tablet, TAKE 1 TABLET BY MOUTH EVERY DAY (Patient not taking: Reported on 3/28/2024), Disp: 90 tablet, Rfl: 1    glipizide (Glucotrol) 5 MG tablet, Take 1 tablet by mouth 2 (Two) Times a Day Before Meals., Disp: 180 tablet, Rfl: 1    There are no discontinued medications.      Review of Systems   Constitutional:  Negative for fever.   Respiratory:  Negative for cough and shortness of breath.    Cardiovascular:  Negative for chest pain, palpitations and leg swelling.   Neurological:  Negative for dizziness, weakness, numbness and headache.        Objective         There were no vitals filed for this visit.  There is no height or weight on file to calculate BMI.         Physical Exam  HENT:      Head: Normocephalic.   Pulmonary:      Effort: Pulmonary effort is normal.   Neurological:      Mental Status: She is alert and oriented to person, place, and time.   Psychiatric:         Mood and Affect: Mood normal.         Behavior: Behavior normal.         Thought Content: Thought content normal.         Judgment: Judgment  normal.             Result Review :               Assessment and Plan     Diagnoses and all orders for this visit:    1. Generalized abdominal pain (Primary)    2. Chronic low back pain without sciatica, unspecified back pain laterality    3. Dumping syndrome    4. Diabetes mellitus type 2 in obese  -     glipizide (Glucotrol) 5 MG tablet; Take 1 tablet by mouth 2 (Two) Times a Day Before Meals.  Dispense: 180 tablet; Refill: 1  -     CBC & Differential; Future  -     Comprehensive Metabolic Panel; Future  -     Hemoglobin A1c; Future  -     Urinalysis With Culture If Indicated -; Future  -     Lipid Panel; Future  -     Microalbumin / Creatinine Urine Ratio - Urine, Clean Catch; Future  -     Hemoglobin A1c; Future    Hold metformin while starting glipizide one a day and check sugars man  not need second dose or metformin.      Twilio video visit  with patients consent lasting   [facility].    Follow Up     Return in about 2 months (around 5/28/2024).    Patient was given instructions and counseling regarding her condition or for health maintenance advice. Please see specific information pulled into the AVS if appropriate.

## 2024-04-14 DIAGNOSIS — M54.9 CHRONIC BACK PAIN, UNSPECIFIED BACK LOCATION, UNSPECIFIED BACK PAIN LATERALITY: ICD-10-CM

## 2024-04-14 DIAGNOSIS — G89.29 CHRONIC BACK PAIN, UNSPECIFIED BACK LOCATION, UNSPECIFIED BACK PAIN LATERALITY: ICD-10-CM

## 2024-04-15 RX ORDER — TRAMADOL HYDROCHLORIDE 50 MG/1
50 TABLET ORAL EVERY 8 HOURS PRN
Qty: 90 TABLET | Refills: 0 | Status: SHIPPED | OUTPATIENT
Start: 2024-04-15

## 2024-05-17 ENCOUNTER — OFFICE VISIT (OUTPATIENT)
Dept: GASTROENTEROLOGY | Facility: CLINIC | Age: 59
End: 2024-05-17
Payer: COMMERCIAL

## 2024-05-17 VITALS
SYSTOLIC BLOOD PRESSURE: 126 MMHG | HEIGHT: 63 IN | DIASTOLIC BLOOD PRESSURE: 70 MMHG | OXYGEN SATURATION: 100 % | HEART RATE: 56 BPM | WEIGHT: 248.4 LBS | BODY MASS INDEX: 44.01 KG/M2

## 2024-05-17 DIAGNOSIS — R68.81 EARLY SATIETY: ICD-10-CM

## 2024-05-17 DIAGNOSIS — R19.7 DIARRHEA, UNSPECIFIED TYPE: ICD-10-CM

## 2024-05-17 DIAGNOSIS — Z98.84 H/O GASTRIC BYPASS: ICD-10-CM

## 2024-05-17 DIAGNOSIS — R19.4 ALTERED BOWEL HABITS: ICD-10-CM

## 2024-05-17 DIAGNOSIS — K59.00 CONSTIPATION, UNSPECIFIED CONSTIPATION TYPE: ICD-10-CM

## 2024-05-17 DIAGNOSIS — R11.0 NAUSEA: Primary | ICD-10-CM

## 2024-05-17 NOTE — PROGRESS NOTES
Chief Complaint  Diarrhea, Constipation (3-4 days ), Abdominal Pain (Left side ), Nausea, and Weight Loss (Lost 50 pds over 2 months )    Marlo Barney is a 58 y.o. female who presents to [facility]- [facility] on referral from HARLEY Dixon for a gastroenterology evaluation of altered bowel habits, left sided abdominal pain, and nausea.      History of Present Illness  New patient presents to the office for altered bowel habits, left sided abdominal pain, and nausea.  History of gastric by pass 1986. For the last year she has struggled with altered bowel habits. She is currently struggling with more constipation. She has a bowel movement every 3-4 days. Stool is hard followed by liquid. Has LLQ pain Denies melena and hematochezia. Heartburn controlled with famotidine 40mg. Does struggle with early satiety and nausea. Denies vomiting, epigastric pain, and dysphagia.     EGD/Colonoscopy 10/11/23 - normal EGD. 5mm tubular adenoma polyp in transverse colon otherwise normal.     CT abdomen/pelvis 9/11/2023 - 8mm nodule in left lower lung lobe    Past Medical History:   Diagnosis Date    Anxiety     Arthritis     Depression     Diabetes mellitus     Diverticulitis     GERD (gastroesophageal reflux disease)     Headache     Hyperlipidemia     Hypertension        Past Surgical History:   Procedure Laterality Date    COLONOSCOPY      ENDOMETRIAL ABLATION      GASTRIC BYPASS  1986    HERNIA REPAIR      TONSILLECTOMY      UPPER GASTROINTESTINAL ENDOSCOPY           Current Outpatient Medications:     acetaminophen (TYLENOL) 650 MG 8 hr tablet, Tylenol Arthritis Pain 650 mg oral tablet extended release take 2 tablets by oral route every 8 hours   Active, Disp: , Rfl:     amLODIPine (NORVASC) 10 MG tablet, TAKE 1 TABLET BY MOUTH EVERY DAY, Disp: 90 tablet, Rfl: 1    aspirin 81 MG EC tablet, aspirin 81 mg oral tablet,delayed release (DR/EC) take 1 tablet (81 mg) by oral route once daily    Active, Disp: , Rfl:     atorvastatin (LIPITOR) 10 MG tablet, TAKE 1 TABLET BY MOUTH EVERYDAY AT BEDTIME, Disp: 90 tablet, Rfl: 1    benazepril (LOTENSIN) 40 MG tablet, TAKE 1 TABLET BY MOUTH EVERY DAY, Disp: 90 tablet, Rfl: 1    busPIRone (BUSPAR) 5 MG tablet, TAKE 1 TABLET BY MOUTH THREE TIMES A DAY, Disp: 270 tablet, Rfl: 1    Calcium Carbonate (Calcium 600) 1500 (600 Ca) MG tablet, , Disp: , Rfl:     carvedilol (COREG) 12.5 MG tablet, TAKE 1 TABLET BY MOUTH TWICE A DAY WITH FOOD, Disp: 180 tablet, Rfl: 1    Cholecalciferol 50 MCG (2000 UT) capsule, Vitamin D3 50 mcg (2,000 unit) oral capsule take 1 capsule by oral route daily   Active, Disp: , Rfl:     Cobalamin Combinations (B-12) 100-5000 MCG sublingual tablet, , Disp: , Rfl:     docusate sodium (COLACE) 50 MG capsule, Take 1 capsule by mouth., Disp: , Rfl:     etodolac XL (LODINE XL) 400 MG 24 hr tablet, TAKE 1 TABLET BY MOUTH EVERY DAY, Disp: 90 tablet, Rfl: 1    famotidine (PEPCID) 40 MG tablet, TAKE 1 TABLET BY MOUTH TWICE A DAY, Disp: 180 tablet, Rfl: 1    glipizide (Glucotrol) 5 MG tablet, Take 1 tablet by mouth 2 (Two) Times a Day Before Meals., Disp: 180 tablet, Rfl: 1    Iron, Ferrous Sulfate, 325 (65 Fe) MG tablet, , Disp: , Rfl:     lidocaine (LIDODERM) 5 %, PLACE 1 PATCH ON THE SKIN AS DIRECTED BY PROVIDER DAILY. REMOVE & DISCARD PATCH WITHIN 12 HOURS OR AS DIRECTED BY MD, Disp: 90 patch, Rfl: 1    Multiple Vitamins-Minerals (MULTIVIT/MULTIMINERAL ADULT PO), , Disp: , Rfl:     ondansetron (ZOFRAN) 4 MG tablet, TAKE 1 TABLET BY MOUTH EVERY 8 HOURS AS NEEDED FOR NAUSEA/VOMITING. (18 TABS/21 DAYS FOR INSURANCE), Disp: 18 tablet, Rfl: 3    Psyllium 0.36 g capsule, , Disp: , Rfl:     spironolactone (ALDACTONE) 25 MG tablet, TAKE 1 TABLET BY MOUTH DAILY. ON MONDAY,WED, AND FRIDAY, Disp: 36 tablet, Rfl: 4    traMADol (ULTRAM) 50 MG tablet, TAKE 1 TABLET BY MOUTH EVERY 8 HOURS AS NEEDED FOR MODERATE PAIN., Disp: 90 tablet, Rfl: 0    metFORMIN ER  "(GLUCOPHAGE-XR) 500 MG 24 hr tablet, TAKE 2 TABLETS BY MOUTH TWICE A DAY, Disp: 360 tablet, Rfl: 1    pantoprazole (PROTONIX) 40 MG EC tablet, Take 1 tablet by mouth Daily. (Patient not taking: Reported on 5/17/2024), Disp: 90 tablet, Rfl: 1     No Known Allergies    Family History   Problem Relation Age of Onset    Diabetes Mother     Hyperlipidemia Mother     Heart disease Mother     Hypertension Mother     Heart disease Father     COPD Father     Diabetes Maternal Aunt     Diabetes Paternal Aunt     Heart disease Maternal Grandmother     Diabetes Maternal Grandmother     Leukemia Maternal Grandfather     Stroke Maternal Grandfather     Thyroid disease Paternal Grandmother     Myelodysplastic syndrome Paternal Grandmother     Cerebral aneurysm Paternal Grandfather     Heart disease Paternal Grandfather     Colon cancer Neg Hx         Social History     Social History Narrative    Not on file       Immunization:  Immunization History   Administered Date(s) Administered    Flu Vaccine Quad PF >36MO 11/08/2016, 11/25/2020    Fluzone (or Fluarix & Flulaval for VFC) >6mos 01/05/2023, 11/10/2023    Influenza TIV (IM) 10/01/2018    Influenza, Unspecified 11/25/2020, 01/05/2023    Pneumococcal Polysaccharide (PPSV23) 11/01/2017        Objective     Vital Signs:   /70 (BP Location: Left arm, Patient Position: Sitting, Cuff Size: Adult)   Pulse 56   Ht 160 cm (62.99\")   Wt 113 kg (248 lb 6.4 oz)   SpO2 100%   BMI 44.01 kg/m²       Physical Exam  Constitutional:       Appearance: Normal appearance. She is normal weight.   HENT:      Head: Normocephalic and atraumatic.      Nose: Nose normal.   Pulmonary:      Effort: Pulmonary effort is normal.   Skin:     General: Skin is warm and dry.   Neurological:      Mental Status: She is alert and oriented to person, place, and time. Mental status is at baseline.   Psychiatric:         Mood and Affect: Mood normal.         Behavior: Behavior normal.         Thought " Content: Thought content normal.         Judgment: Judgment normal.         Result Review :                     Assessment and Plan    Diagnoses and all orders for this visit:    1. Nausea (Primary)  -     Cancel: NM Gastric Emptying; Future  -     NM Gastric Emptying; Future    2. Early satiety  -     Cancel: NM Gastric Emptying; Future  -     NM Gastric Emptying; Future    3. H/O gastric bypass    4. Altered bowel habits    5. Constipation, unspecified constipation type    6. Diarrhea, unspecified type    Recommend trying Miralax, educated patient on how to titrate. If constipation still persists may consider Trulance of Linzess.  Gastric emptying study.     Follow Up   Return in about 4 months (around 9/17/2024).  Patient was given instructions and counseling regarding her condition or for health maintenance advice. Please see specific information pulled into the AVS if appropriate.

## 2024-05-20 ENCOUNTER — LAB (OUTPATIENT)
Dept: LAB | Facility: HOSPITAL | Age: 59
End: 2024-05-20
Payer: COMMERCIAL

## 2024-05-20 ENCOUNTER — TELEPHONE (OUTPATIENT)
Dept: FAMILY MEDICINE CLINIC | Facility: CLINIC | Age: 59
End: 2024-05-20
Payer: COMMERCIAL

## 2024-05-20 DIAGNOSIS — S80.861A TICK BITE OF RIGHT LOWER LEG, INITIAL ENCOUNTER: Primary | ICD-10-CM

## 2024-05-20 DIAGNOSIS — S80.861A TICK BITE OF RIGHT LOWER LEG, INITIAL ENCOUNTER: ICD-10-CM

## 2024-05-20 DIAGNOSIS — E11.69 TYPE 2 DIABETES MELLITUS WITH OBESITY: ICD-10-CM
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
## 2024-05-20 DIAGNOSIS — E66.9 TYPE 2 DIABETES MELLITUS WITH OBESITY: ICD-10-CM
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
## 2024-05-20 LAB — HBA1C MFR BLD: 6 % (ref 4.8–5.6)

## 2024-05-20 PROCEDURE — 86618 LYME DISEASE ANTIBODY: CPT

## 2024-05-20 PROCEDURE — 87798 DETECT AGENT NOS DNA AMP: CPT

## 2024-05-20 PROCEDURE — 83036 HEMOGLOBIN GLYCOSYLATED A1C: CPT

## 2024-05-20 PROCEDURE — 36415 COLL VENOUS BLD VENIPUNCTURE: CPT

## 2024-05-20 PROCEDURE — 87484 EHRLICHA CHAFFEENSIS AMP PRB: CPT

## 2024-05-20 PROCEDURE — 87468 ANAPLSMA PHGCYTOPHLM AMP PRB: CPT

## 2024-05-20 NOTE — TELEPHONE ENCOUNTER
Patient is on cancellation list and advised patient to reach out mornings at 7am for same day besides Tuesdays.

## 2024-05-20 NOTE — TELEPHONE ENCOUNTER
Patient states she may have [place] Spotted Fever. Patient states she had an embedded tick. Patient states since Friday she has been having major joint aches, hurts to move, small fever and randomly gets cold. Patient offered an appointment with  tomorrow 5/21/24 patient declined. Patient scheduled with Sarbjit 6/12/24. Patient requesting to speak with Gwyn.

## 2024-05-20 NOTE — TELEPHONE ENCOUNTER
Placed order for tick panel, pt will go and get labs done  Pt's tick was on her right leg about 3 inches up from her ankle..

## 2024-05-22 LAB — B BURGDOR IGG+IGM SER QL IA: NEGATIVE

## 2024-05-25 LAB
A PHAGOCYTOPH DNA BLD QL NAA+PROBE: NEGATIVE
E CHAFFEENSIS DNA BLD QL NAA+PROBE: NEGATIVE
RICKETTSIA RICKETTSII DNA, RT: NOT DETECTED

## 2024-06-03 ENCOUNTER — HOSPITAL ENCOUNTER (OUTPATIENT)
Dept: NUCLEAR MEDICINE | Facility: HOSPITAL | Age: 59
Discharge: HOME | End: 2024-06-03
Payer: COMMERCIAL

## 2024-06-03 DIAGNOSIS — R68.81 EARLY SATIETY: ICD-10-CM

## 2024-06-03 DIAGNOSIS — R11.0 NAUSEA: ICD-10-CM

## 2024-06-03 PROCEDURE — A9541 TC99M SULFUR COLLOID: HCPCS

## 2024-06-03 PROCEDURE — 0 TECHNETIUM SULFUR COLLOID

## 2024-06-03 PROCEDURE — 78264 GASTRIC EMPTYING IMG STUDY: CPT

## 2024-06-03 RX ADMIN — TECHNETIUM TC 99M SULFUR COLLOID 1 DOSE: KIT at 07:42

## 2024-06-04 ENCOUNTER — TELEPHONE (OUTPATIENT)
Dept: GASTROENTEROLOGY | Facility: CLINIC | Age: 59
End: 2024-06-04
Payer: COMMERCIAL

## 2024-06-04 NOTE — TELEPHONE ENCOUNTER
We can start patient on Linzess and stop MiraLAX.  Please see if this is something the patient is agreeable to, diarrhea can occur the first week of Linzess but usually gets better the second week.

## 2024-06-04 NOTE — TELEPHONE ENCOUNTER
----- Message from Latoya Skaggs sent at 6/3/2024  5:25 PM EDT -----  Gastric emptying scan is normal, please check on patient's symptoms, noted follow-up is not until November

## 2024-06-04 NOTE — TELEPHONE ENCOUNTER
S/w patient. Patients follow up has been moved to a sooner date per patients request,   Patient asking if their is anything we can try in the meantime.     Patient is currently taking Miralax daily and still having BM's every 2-3 days, once she starts having BM's they can turn into loose stool.  Patient denies blood in stool.   Patient is experiencing abdominal pain about 1-2 inches away from her belly button on the left side, it's only on occasion and does feel better once she has a BM.     Patient does have daily nausea but denies vomiting   Please advise

## 2024-06-06 DIAGNOSIS — G89.29 CHRONIC BACK PAIN, UNSPECIFIED BACK LOCATION, UNSPECIFIED BACK PAIN LATERALITY: ICD-10-CM

## 2024-06-06 DIAGNOSIS — M54.9 CHRONIC BACK PAIN, UNSPECIFIED BACK LOCATION, UNSPECIFIED BACK PAIN LATERALITY: ICD-10-CM

## 2024-06-06 RX ORDER — TRAMADOL HYDROCHLORIDE 50 MG/1
50 TABLET ORAL EVERY 8 HOURS PRN
Qty: 90 TABLET | Refills: 0 | Status: SHIPPED | OUTPATIENT
Start: 2024-06-06

## 2024-06-06 NOTE — TELEPHONE ENCOUNTER
Itzel from [facility] is going to be filling all her medication from now on.  She requested a refill of Tramadol for her.    UDS and consent  UTD 02/01/24    LOV 03/28/24  NOV 06/12/24

## 2024-06-07 DIAGNOSIS — K59.00 CONSTIPATION, UNSPECIFIED CONSTIPATION TYPE: Primary | ICD-10-CM

## 2024-06-12 ENCOUNTER — OFFICE VISIT (OUTPATIENT)
Dept: ORTHOPEDIC SURGERY | Facility: CLINIC | Age: 59
End: 2024-06-12
Payer: COMMERCIAL

## 2024-06-12 ENCOUNTER — OFFICE VISIT (OUTPATIENT)
Dept: FAMILY MEDICINE CLINIC | Facility: CLINIC | Age: 59
End: 2024-06-12
Payer: COMMERCIAL

## 2024-06-12 ENCOUNTER — LAB (OUTPATIENT)
Dept: LAB | Facility: HOSPITAL | Age: 59
End: 2024-06-12
Payer: COMMERCIAL

## 2024-06-12 VITALS
DIASTOLIC BLOOD PRESSURE: 70 MMHG | SYSTOLIC BLOOD PRESSURE: 116 MMHG | RESPIRATION RATE: 18 BRPM | OXYGEN SATURATION: 98 % | WEIGHT: 243.2 LBS | HEART RATE: 63 BPM | HEIGHT: 63 IN | BODY MASS INDEX: 43.09 KG/M2 | TEMPERATURE: 96.5 F

## 2024-06-12 VITALS
WEIGHT: 243 LBS | HEIGHT: 63 IN | SYSTOLIC BLOOD PRESSURE: 106 MMHG | DIASTOLIC BLOOD PRESSURE: 75 MMHG | OXYGEN SATURATION: 97 % | HEART RATE: 62 BPM | BODY MASS INDEX: 43.05 KG/M2

## 2024-06-12 DIAGNOSIS — M54.50 CHRONIC LOW BACK PAIN, UNSPECIFIED BACK PAIN LATERALITY, UNSPECIFIED WHETHER SCIATICA PRESENT: ICD-10-CM

## 2024-06-12 DIAGNOSIS — R53.83 FATIGUE, UNSPECIFIED TYPE: ICD-10-CM
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
## 2024-06-12 DIAGNOSIS — S80.861A TICK BITE OF RIGHT LOWER LEG, INITIAL ENCOUNTER: ICD-10-CM

## 2024-06-12 DIAGNOSIS — G89.29 CHRONIC LOW BACK PAIN, UNSPECIFIED BACK PAIN LATERALITY, UNSPECIFIED WHETHER SCIATICA PRESENT: ICD-10-CM

## 2024-06-12 DIAGNOSIS — Z00.00 ANNUAL PHYSICAL EXAM: Primary | ICD-10-CM

## 2024-06-12 DIAGNOSIS — G56.03 CARPAL TUNNEL SYNDROME, BILATERAL: Primary | ICD-10-CM

## 2024-06-12 DIAGNOSIS — R30.0 DYSURIA: ICD-10-CM

## 2024-06-12 DIAGNOSIS — R82.90 ABNORMAL URINALYSIS: ICD-10-CM

## 2024-06-12 DIAGNOSIS — Z12.4 SCREENING FOR CERVICAL CANCER: ICD-10-CM

## 2024-06-12 DIAGNOSIS — E66.01 OBESITY, CLASS III, BMI 40-49.9 (MORBID OBESITY): ICD-10-CM

## 2024-06-12 LAB
BILIRUB BLD-MCNC: ABNORMAL MG/DL
CLARITY, POC: CLEAR
COLOR UR: YELLOW
EXPIRATION DATE: ABNORMAL
GLUCOSE UR STRIP-MCNC: NEGATIVE MG/DL
KETONES UR QL: NEGATIVE
LEUKOCYTE EST, POC: ABNORMAL
Lab: ABNORMAL
NITRITE UR-MCNC: NEGATIVE MG/ML
PH UR: 5.5 [PH] (ref 5–8)
PROT UR STRIP-MCNC: NEGATIVE MG/DL
RBC # UR STRIP: NEGATIVE /UL
SP GR UR: 1.03 (ref 1–1.03)
UROBILINOGEN UR QL: ABNORMAL

## 2024-06-12 PROCEDURE — 87624 HPV HI-RISK TYP POOLED RSLT: CPT | Performed by: NURSE PRACTITIONER

## 2024-06-12 PROCEDURE — 87468 ANAPLSMA PHGCYTOPHLM AMP PRB: CPT

## 2024-06-12 PROCEDURE — 87484 EHRLICHA CHAFFEENSIS AMP PRB: CPT

## 2024-06-12 PROCEDURE — 81003 URINALYSIS AUTO W/O SCOPE: CPT | Performed by: NURSE PRACTITIONER

## 2024-06-12 PROCEDURE — 36415 COLL VENOUS BLD VENIPUNCTURE: CPT

## 2024-06-12 PROCEDURE — 87798 DETECT AGENT NOS DNA AMP: CPT

## 2024-06-12 PROCEDURE — 99396 PREV VISIT EST AGE 40-64: CPT | Performed by: NURSE PRACTITIONER

## 2024-06-12 PROCEDURE — 87086 URINE CULTURE/COLONY COUNT: CPT | Performed by: NURSE PRACTITIONER

## 2024-06-12 PROCEDURE — 86618 LYME DISEASE ANTIBODY: CPT

## 2024-06-12 PROCEDURE — G0123 SCREEN CERV/VAG THIN LAYER: HCPCS | Performed by: NURSE PRACTITIONER

## 2024-06-12 RX ORDER — TRIAMCINOLONE ACETONIDE 40 MG/ML
40 INJECTION, SUSPENSION INTRA-ARTICULAR; INTRAMUSCULAR
Status: COMPLETED | OUTPATIENT
Start: 2024-06-12 | End: 2024-06-12

## 2024-06-12 RX ORDER — LIDOCAINE HYDROCHLORIDE 10 MG/ML
1 INJECTION, SOLUTION INFILTRATION; PERINEURAL
Status: COMPLETED | OUTPATIENT
Start: 2024-06-12 | End: 2024-06-12

## 2024-06-12 RX ORDER — DOXYCYCLINE HYCLATE 100 MG/1
100 CAPSULE ORAL 2 TIMES DAILY
Qty: 20 CAPSULE | Refills: 0 | Status: SHIPPED | OUTPATIENT
Start: 2024-06-12

## 2024-06-12 RX ORDER — METHYLPREDNISOLONE 4 MG/1
TABLET ORAL
Qty: 21 TABLET | Refills: 0 | Status: SHIPPED | OUTPATIENT
Start: 2024-06-12

## 2024-06-12 RX ADMIN — TRIAMCINOLONE ACETONIDE 40 MG: 40 INJECTION, SUSPENSION INTRA-ARTICULAR; INTRAMUSCULAR at 10:12

## 2024-06-12 RX ADMIN — TRIAMCINOLONE ACETONIDE 40 MG: 40 INJECTION, SUSPENSION INTRA-ARTICULAR; INTRAMUSCULAR at 10:11

## 2024-06-12 RX ADMIN — LIDOCAINE HYDROCHLORIDE 1 ML: 10 INJECTION, SOLUTION INFILTRATION; PERINEURAL at 10:11

## 2024-06-12 RX ADMIN — LIDOCAINE HYDROCHLORIDE 1 ML: 10 INJECTION, SOLUTION INFILTRATION; PERINEURAL at 10:12

## 2024-06-12 NOTE — PROGRESS NOTES
"Chief Complaint  Follow-up and Pain of the Left Wrist and Follow-up and Pain of the Right Wrist    Subjective          Marlo Barney presents to [facility] for a follow up for bilateral wrist.     History of Present Illness    The patient presents here today for a follow up for bilateral wrist pain. She has been treating her bilateral carpal tunnel conservatively with injections. She was last seen in the office on 24 where she had bilateral carpal tunnel injections. She reports that these give her great relief and is requesting repeat injections today in the office. She reports no new injury, trauma or falls since her last visit.      No Known Allergies     Social History     Socioeconomic History    Marital status:    Tobacco Use    Smoking status: Former     Current packs/day: 0.00     Average packs/day: 0.3 packs/day for 1 year (0.3 ttl pk-yrs)     Types: Cigarettes     Start date: 1986     Quit date: 1987     Years since quittin.8     Passive exposure: Past    Smokeless tobacco: Never   Vaping Use    Vaping status: Never Used   Substance and Sexual Activity    Alcohol use: Yes     Comment: occationally    Drug use: Never    Sexual activity: Not Currently     Partners: Male        I reviewed the patient's chief complaint, history of present illness, review of systems, past medical history, surgical history, family history, social history, medications, and allergy list.     REVIEW OF SYSTEMS    Constitutional: Denies fevers, chills, weight loss  Cardiovascular: Denies chest pain, shortness of breath  Skin: Denies rashes, acute skin changes  Neurologic: Denies headache, loss of consciousness  MSK: Bialteral wrist pain       Objective   Vital Signs:   /75   Pulse 62   Ht 160 cm (63\")   Wt 110 kg (243 lb)   SpO2 97%   BMI 43.05 kg/m²     Body mass index is 43.05 kg/m².    Physical Exam    General: Alert. No acute distress.   Bilateral upper " extremity: Active elevation to 110 degrees, external rotation of 45 degrees, internal rotation to the back pocket.  5 out of 5 with rotator cuff testing with no pain.  Positive impingement.  Negative speeds.  Negative Worth's.  Neurovascular intact. Positive tinel and compression testing at the wrists.         Medium Joint Arthrocentesis  Date/Time: 6/12/2024 10:11 AM  Consent given by: patient  Site marked: site marked  Timeout: Immediately prior to procedure a time out was called to verify the correct patient, procedure, equipment, support staff and site/side marked as required   Supporting Documentation  Indications: pain   Procedure Details  Location: wrist (right) -   Needle size: 23 G  Medications administered: 1 mL lidocaine 1 %; 40 mg triamcinolone acetonide 40 MG/ML      Medium Joint Arthrocentesis  Date/Time: 6/12/2024 10:12 AM  Consent given by: patient  Site marked: site marked  Timeout: Immediately prior to procedure a time out was called to verify the correct patient, procedure, equipment, support staff and site/side marked as required   Supporting Documentation  Indications: pain   Procedure Details  Location: wrist (left) -   Needle size: 23 G  Medications administered: 1 mL lidocaine 1 %; 40 mg triamcinolone acetonide 40 MG/ML  Patient tolerance: patient tolerated the procedure well with no immediate complications    This injection documentation was Scribed for Tereso Coppola MD by Dennise Marino MA.  06/12/24   10:12 EDT    Imaging Results (Most Recent)       None                     Assessment and Plan        NM Gastric Emptying    Result Date: 6/3/2024  Narrative:  NM GASTRIC EMPTYING-  Date of Exam: 6/3/2024 7:40 AM  Indication: Nausea/vomiting.  Comparison: None available.   TECHNIQUE:      After obtaining the patient?s consent, images were obtained in anterior and posterior planes subsequent to the oral administration of Tc-99m sulfur colloid labeled to solid food.  Data were acquired for  determination of gastric emptying. 0.52 mCi sulfur colloid administered.  FINDINGS:    STOMACH:        Normal appearance. Gastric emptying is within normal limits.  Retained gastric activity as follows: 1 hour post-ingestion: 60    (Normal: <90%) 2 hours post-ingestion: 39    (Normal: <60%) 3 hour post-ingestion: 23    (Normal: <30%) 4 hour post-ingestion: 4   (Normal: <10%)      Impression: Normal gastric emptying  Electronically Signed By-Ra Robertson MD On:6/3/2024 3:04 PM        Diagnoses and all orders for this visit:    1. Carpal tunnel syndrome, bilateral (Primary)    2. Obesity, Class III, BMI 40-49.9 (morbid obesity)        The patient presents here today for a follow up for bilateral carpal tunnel.    Discussed the risks and benefits of bilateral carpal tunnel injections today in the office. Patient expressed understanding and wishes to proceed. She tolerated the injections well and without any complications.     Educated on risk of elevated BMI.  Discussed options for weight loss/decreasing BMI prior to procedure including dietician consult, weight loss options and exercise program. and Call or return if worsening symptoms.    Scribed for Tereso Coppola MD by Tala Acevedo  06/12/2024   10:01 EDT         Follow Up       3 months     Patient was given instructions and counseling regarding her condition or for health maintenance advice. Please see specific information pulled into the AVS if appropriate.       I have personally performed the services described in this document as scribed by the above individual and it is both accurate and complete. Tereso Coppola MD 06/12/24 10:30 EDT

## 2024-06-12 NOTE — PROGRESS NOTES
Chief Complaint  Annual Exam, Gynecologic Exam, joint pain, cold intolerance, Back Pain (low), and Difficulty Urinating    Subjective        Marlo Barney presents to [facility]  History of Present Illness  Tick bite was on right lower leg 4-6 weeks ago still having flares of fatigue  and joint aches with myalgias this past weekend.    Having a flare of sciatica since taken off meds for testing so has been of tramadol and it started back.    Difficulty urinating with more lbp than usual and cloudy as well.  Started cranberry pills last Tuesday.    Obesity: still having diarrhea   but has lost over 40 pounds in the last 3 years.  5 in the last year.  Gynecologic Exam  Associated symptoms include back pain. Pertinent negatives include no fever.   Back Pain  Pertinent negatives include no chest pain, fever, numbness or weakness.   Difficulty Urinating         The following portions of the patient's history were personally reviewed and updated as appropriate: allergies, current medications, past medical history, past surgical history, past family history, and past social history.     Body mass index is 43.09 kg/m².           Past History:    Medical History: has a past medical history of Anxiety, Arthritis, Depression, Diabetes mellitus, Diverticulitis, GERD (gastroesophageal reflux disease), Headache, Hyperlipidemia, and Hypertension.     Surgical History: has a past surgical history that includes Tonsillectomy; Gastric bypass (1986); Endometrial ablation; Hernia repair; Colonoscopy; and Upper gastrointestinal endoscopy.     Family History: family history includes COPD in her father; Cerebral aneurysm in her paternal grandfather; Diabetes in her maternal aunt, maternal grandmother, mother, and paternal aunt; Heart disease in her father, maternal grandmother, mother, and paternal grandfather; Hyperlipidemia in her mother; Hypertension in her mother; Leukemia in her maternal  grandfather; Myelodysplastic syndrome in her paternal grandmother; Stroke in her maternal grandfather; Thyroid disease in her paternal grandmother.     Social History: reports that she quit smoking about 36 years ago. Her smoking use included cigarettes. She started smoking about 37 years ago. She has a 0.3 pack-year smoking history. She has been exposed to tobacco smoke. She has never used smokeless tobacco. She reports current alcohol use. She reports that she does not use drugs.    Allergies: Patient has no known allergies.          Current Outpatient Medications:     acetaminophen (TYLENOL) 650 MG 8 hr tablet, Tylenol Arthritis Pain 650 mg oral tablet extended release take 2 tablets by oral route every 8 hours   Active, Disp: , Rfl:     amLODIPine (NORVASC) 10 MG tablet, TAKE 1 TABLET BY MOUTH EVERY DAY, Disp: 90 tablet, Rfl: 1    aspirin 81 MG EC tablet, aspirin 81 mg oral tablet,delayed release (DR/EC) take 1 tablet (81 mg) by oral route once daily   Active, Disp: , Rfl:     atorvastatin (LIPITOR) 10 MG tablet, TAKE 1 TABLET BY MOUTH EVERYDAY AT BEDTIME, Disp: 90 tablet, Rfl: 1    benazepril (LOTENSIN) 40 MG tablet, TAKE 1 TABLET BY MOUTH EVERY DAY, Disp: 90 tablet, Rfl: 1    busPIRone (BUSPAR) 5 MG tablet, TAKE 1 TABLET BY MOUTH THREE TIMES A DAY, Disp: 270 tablet, Rfl: 1    Calcium Carbonate (Calcium 600) 1500 (600 Ca) MG tablet, , Disp: , Rfl:     carvedilol (COREG) 12.5 MG tablet, TAKE 1 TABLET BY MOUTH TWICE A DAY WITH FOOD, Disp: 180 tablet, Rfl: 1    Cholecalciferol 50 MCG (2000 UT) capsule, Vitamin D3 50 mcg (2,000 unit) oral capsule take 1 capsule by oral route daily   Active, Disp: , Rfl:     Cobalamin Combinations (B-12) 100-5000 MCG sublingual tablet, , Disp: , Rfl:     docusate sodium (COLACE) 50 MG capsule, Take 1 capsule by mouth., Disp: , Rfl:     etodolac XL (LODINE XL) 400 MG 24 hr tablet, TAKE 1 TABLET BY MOUTH EVERY DAY, Disp: 90 tablet, Rfl: 1    famotidine (PEPCID) 40 MG tablet, TAKE 1  TABLET BY MOUTH TWICE A DAY, Disp: 180 tablet, Rfl: 1    glipizide (Glucotrol) 5 MG tablet, Take 1 tablet by mouth 2 (Two) Times a Day Before Meals., Disp: 180 tablet, Rfl: 1    Iron, Ferrous Sulfate, 325 (65 Fe) MG tablet, , Disp: , Rfl:     lidocaine (LIDODERM) 5 %, PLACE 1 PATCH ON THE SKIN AS DIRECTED BY PROVIDER DAILY. REMOVE & DISCARD PATCH WITHIN 12 HOURS OR AS DIRECTED BY MD, Disp: 90 patch, Rfl: 1    linaclotide (Linzess) 72 MCG capsule capsule, Take 1 capsule by mouth Every Morning Before Breakfast for 90 days., Disp: 90 capsule, Rfl: 3    Multiple Vitamins-Minerals (MULTIVIT/MULTIMINERAL ADULT PO), , Disp: , Rfl:     ondansetron (ZOFRAN) 4 MG tablet, TAKE 1 TABLET BY MOUTH EVERY 8 HOURS AS NEEDED FOR NAUSEA/VOMITING. (18 TABS/21 DAYS FOR INSURANCE), Disp: 18 tablet, Rfl: 3    Psyllium 0.36 g capsule, , Disp: , Rfl:     spironolactone (ALDACTONE) 25 MG tablet, TAKE 1 TABLET BY MOUTH DAILY. ON MONDAY,WED, AND FRIDAY, Disp: 36 tablet, Rfl: 4    traMADol (ULTRAM) 50 MG tablet, Take 1 tablet by mouth Every 8 (Eight) Hours As Needed for Moderate Pain., Disp: 90 tablet, Rfl: 0    doxycycline (VIBRAMYCIN) 100 MG capsule, Take 1 capsule by mouth 2 (Two) Times a Day., Disp: 20 capsule, Rfl: 0    methylPREDNISolone (MEDROL) 4 MG dose pack, Take as directed on package instructions., Disp: 21 tablet, Rfl: 0    Medications Discontinued During This Encounter   Medication Reason    pantoprazole (PROTONIX) 40 MG EC tablet *Therapy completed    metFORMIN ER (GLUCOPHAGE-XR) 500 MG 24 hr tablet *Therapy completed         Review of Systems   Constitutional:  Negative for fever.   Respiratory:  Negative for cough and shortness of breath.    Cardiovascular:  Negative for chest pain, palpitations and leg swelling.   Genitourinary:  Positive for difficulty urinating.   Musculoskeletal:  Positive for back pain.   Neurological:  Negative for dizziness, weakness, numbness and headache.        Objective         Vitals:    06/12/24  "0821   BP: 116/70   BP Location: Right arm   Patient Position: Sitting   Cuff Size: Large Adult   Pulse: 63   Resp: 18   Temp: 96.5 °F (35.8 °C)   TempSrc: Temporal   SpO2: 98%   Weight: 110 kg (243 lb 3.2 oz)   Height: 160 cm (62.99\")     Body mass index is 43.09 kg/m².         Physical Exam  Vitals reviewed.   Constitutional:       Appearance: Normal appearance. She is well-developed.   HENT:      Head: Normocephalic and atraumatic.      Mouth/Throat:      Pharynx: No oropharyngeal exudate.   Eyes:      Conjunctiva/sclera: Conjunctivae normal.      Pupils: Pupils are equal, round, and reactive to light.   Neck:      Thyroid: No thyroid mass, thyromegaly or thyroid tenderness.   Cardiovascular:      Rate and Rhythm: Normal rate and regular rhythm.      Heart sounds: Normal heart sounds. No murmur heard.     No friction rub. No gallop.   Pulmonary:      Effort: Pulmonary effort is normal.      Breath sounds: Normal breath sounds. No wheezing or rhonchi.   Abdominal:      General: Bowel sounds are normal. There is no distension.      Palpations: Abdomen is soft.      Tenderness: There is no abdominal tenderness. There is no guarding.      Hernia: There is no hernia in the left inguinal area or right inguinal area.   Genitourinary:     Pubic Area: No rash.       Labia:         Right: No rash or injury.         Left: No rash or injury.       Urethra: No prolapse.      Vagina: Normal.      Cervix: Normal.      Uterus: Normal.       Adnexa: Right adnexa normal and left adnexa normal.      Rectum: Normal.   Musculoskeletal:         General: Normal range of motion.      Cervical back: Normal range of motion.   Skin:     General: Skin is warm and dry.      Capillary Refill: Capillary refill takes less than 2 seconds.   Neurological:      Mental Status: She is alert and oriented to person, place, and time.      Cranial Nerves: No cranial nerve deficit.   Psychiatric:         Mood and Affect: Mood and affect normal.         " Behavior: Behavior normal.         Thought Content: Thought content normal.         Judgment: Judgment normal.             Result Review :               Assessment and Plan     Diagnoses and all orders for this visit:    1. Annual physical exam (Primary)  Comments:  discussed diet and exercise.    2. Screening for cervical cancer  -     IgP, Aptima HPV; Future  -     IgP, Aptima HPV    3. Dysuria  -     POC Urinalysis Dipstick, Automated  -     Urine Culture - Urine, Urine, Clean Catch  -     doxycycline (VIBRAMYCIN) 100 MG capsule; Take 1 capsule by mouth 2 (Two) Times a Day.  Dispense: 20 capsule; Refill: 0    4. Abnormal urinalysis  -     Urine Culture - Urine, Urine, Clean Catch    5. Fatigue, unspecified type  -     Tick Panel; Future    6. Tick bite of right lower leg, initial encounter  -     Tick Panel; Future    7. Chronic low back pain, unspecified back pain laterality, unspecified whether sciatica present  -     methylPREDNISolone (MEDROL) 4 MG dose pack; Take as directed on package instructions.  Dispense: 21 tablet; Refill: 0              Follow Up     Return in about 6 months (around 12/12/2024) for Next scheduled follow up.    Patient was given instructions and counseling regarding her condition or for health maintenance advice. Please see specific information pulled into the AVS if appropriate.

## 2024-06-14 LAB
B BURGDOR IGG+IGM SER QL IA: NEGATIVE
BACTERIA SPEC AEROBE CULT: NO GROWTH
CYTOLOGIST CVX/VAG CYTO: NORMAL
CYTOLOGY CVX/VAG DOC CYTO: NORMAL
CYTOLOGY CVX/VAG DOC THIN PREP: NORMAL
DX ICD CODE: NORMAL
HPV I/H RISK 4 DNA CVX QL PROBE+SIG AMP: NEGATIVE
Lab: NORMAL
OTHER STN SPEC: NORMAL
STAT OF ADQ CVX/VAG CYTO-IMP: NORMAL

## 2024-06-16 LAB — RICKETTSIA RICKETTSII DNA, RT: NOT DETECTED

## 2024-06-17 LAB
A PHAGOCYTOPH DNA BLD QL NAA+PROBE: NEGATIVE
E CHAFFEENSIS DNA BLD QL NAA+PROBE: NEGATIVE

## 2024-06-25 ENCOUNTER — PATIENT MESSAGE (OUTPATIENT)
Dept: FAMILY MEDICINE CLINIC | Facility: CLINIC | Age: 59
End: 2024-06-25
Payer: COMMERCIAL

## 2024-06-26 NOTE — TELEPHONE ENCOUNTER
From: Marlo Barney  To: Tung Arias  Sent: 6/25/2024 9:37 AM EDT  Subject: Paperwork    I have forms from [employer] for the Dr most familiar with my conditions. Will you fill them if I make an appointment? My hearing is 09/25/24.

## 2024-07-08 DIAGNOSIS — G89.29 CHRONIC BACK PAIN, UNSPECIFIED BACK LOCATION, UNSPECIFIED BACK PAIN LATERALITY: ICD-10-CM

## 2024-07-08 DIAGNOSIS — R11.0 NAUSEA: ICD-10-CM

## 2024-07-08 DIAGNOSIS — M54.9 CHRONIC BACK PAIN, UNSPECIFIED BACK LOCATION, UNSPECIFIED BACK PAIN LATERALITY: ICD-10-CM

## 2024-07-08 RX ORDER — ONDANSETRON 4 MG/1
4 TABLET, FILM COATED ORAL EVERY 8 HOURS PRN
Qty: 18 TABLET | Refills: 0 | Status: SHIPPED | OUTPATIENT
Start: 2024-07-08

## 2024-07-08 RX ORDER — TRAMADOL HYDROCHLORIDE 50 MG/1
50 TABLET ORAL EVERY 8 HOURS PRN
Qty: 90 TABLET | Refills: 0 | Status: SHIPPED | OUTPATIENT
Start: 2024-07-08

## 2024-07-08 NOTE — TELEPHONE ENCOUNTER
UPCOMING APPTS  With Family Medicine (HARLEY Dixon)  12/11/2024 at 10:45 AM  LAST OFFICE VISIT - THIS DEPT  6/12/2024 Tung Arias APRN    Uds 2/1/24

## 2024-07-24 DIAGNOSIS — I10 ESSENTIAL HYPERTENSION: ICD-10-CM

## 2024-07-24 DIAGNOSIS — E78.2 MIXED HYPERLIPIDEMIA: ICD-10-CM

## 2024-07-24 RX ORDER — BENAZEPRIL HYDROCHLORIDE 40 MG/1
TABLET ORAL
Qty: 90 TABLET | Refills: 1 | Status: SHIPPED | OUTPATIENT
Start: 2024-07-24

## 2024-07-24 RX ORDER — CARVEDILOL 12.5 MG/1
12.5 TABLET ORAL 2 TIMES DAILY WITH MEALS
Qty: 180 TABLET | Refills: 1 | Status: SHIPPED | OUTPATIENT
Start: 2024-07-24

## 2024-07-24 RX ORDER — ATORVASTATIN CALCIUM 10 MG/1
TABLET, FILM COATED ORAL
Qty: 90 TABLET | Refills: 1 | Status: SHIPPED | OUTPATIENT
Start: 2024-07-24

## 2024-07-24 RX ORDER — AMLODIPINE BESYLATE 10 MG/1
TABLET ORAL
Qty: 90 TABLET | Refills: 1 | Status: SHIPPED | OUTPATIENT
Start: 2024-07-24

## 2024-07-26 ENCOUNTER — OFFICE VISIT (OUTPATIENT)
Dept: GASTROENTEROLOGY | Facility: CLINIC | Age: 59
End: 2024-07-26
Payer: COMMERCIAL

## 2024-07-26 VITALS
WEIGHT: 243 LBS | DIASTOLIC BLOOD PRESSURE: 62 MMHG | SYSTOLIC BLOOD PRESSURE: 105 MMHG | HEART RATE: 61 BPM | OXYGEN SATURATION: 100 % | BODY MASS INDEX: 43.05 KG/M2 | HEIGHT: 63 IN

## 2024-07-26 DIAGNOSIS — R19.4 ALTERED BOWEL HABITS: ICD-10-CM

## 2024-07-26 DIAGNOSIS — K58.2 IRRITABLE BOWEL SYNDROME WITH BOTH CONSTIPATION AND DIARRHEA: Primary | ICD-10-CM

## 2024-07-26 DIAGNOSIS — K59.00 CONSTIPATION, UNSPECIFIED CONSTIPATION TYPE: ICD-10-CM

## 2024-07-26 DIAGNOSIS — R11.0 NAUSEA: ICD-10-CM

## 2024-07-26 DIAGNOSIS — R19.7 DIARRHEA, UNSPECIFIED TYPE: ICD-10-CM

## 2024-07-26 DIAGNOSIS — R10.32 LLQ PAIN: ICD-10-CM

## 2024-07-26 NOTE — PROGRESS NOTES
Chief Complaint  Follow-up, Constipation, Diarrhea, Abdominal Pain (Left lower abdomina pain ), and Nausea    Marlo aBrney is a 59 y.o. female who presents to [facility]- [facility] for follow up.     History of present Illness  Established care 5/17/2024 for nausea, LLQ pain, and constipation. Bowel movement every 3-4 days. Heartburn controlled with pepcid 40mg daily. Recommended Miralax, consider Linzess if no relief.   CT abdomen/pelvis 9/11/2023 for LLQ pain - 8mm nodule in left lower lung lobe  Last EGD/Colonoscopy 10/11/23 - normal EGD. 5mm tubular adenoma polyp in transverse colon otherwise normal.   Gastric emptying study 6/3/24 - normal  Patient call 6/4/24 - persistent constipation, minimal relief with Miralax. Linzess 72 prescribed.     Patient presents to the office for follow up. She is taking Linzess 72 daily. She still goes 2-3 days without a bowel movement. She has formed stool followed by diarrhea. Denies melena and hematochezia. Still has LLQ abdominal pain, seems to be worse with constipation. Nausea relieved with OTC miriam.     Past Medical History:   Diagnosis Date    Anxiety     Arthritis     Depression     Diabetes mellitus     Diverticulitis     GERD (gastroesophageal reflux disease)     Headache     Hyperlipidemia     Hypertension        Past Surgical History:   Procedure Laterality Date    COLONOSCOPY      ENDOMETRIAL ABLATION      GASTRIC BYPASS  1986    HERNIA REPAIR      TONSILLECTOMY      UPPER GASTROINTESTINAL ENDOSCOPY           Current Outpatient Medications:     acetaminophen (TYLENOL) 650 MG 8 hr tablet, Tylenol Arthritis Pain 650 mg oral tablet extended release take 2 tablets by oral route every 8 hours   Active, Disp: , Rfl:     amLODIPine (NORVASC) 10 MG tablet, TAKE 1 TABLET BY MOUTH EVERY DAY, Disp: 90 tablet, Rfl: 1    aspirin 81 MG EC tablet, aspirin 81 mg oral tablet,delayed release (DR/EC) take 1 tablet (81 mg) by oral route once  daily   Active, Disp: , Rfl:     atorvastatin (LIPITOR) 10 MG tablet, TAKE 1 TABLET BY MOUTH EVERYDAY AT BEDTIME, Disp: 90 tablet, Rfl: 1    benazepril (LOTENSIN) 40 MG tablet, TAKE 1 TABLET BY MOUTH EVERY DAY, Disp: 90 tablet, Rfl: 1    busPIRone (BUSPAR) 5 MG tablet, TAKE 1 TABLET BY MOUTH THREE TIMES A DAY, Disp: 270 tablet, Rfl: 1    Calcium Carbonate (Calcium 600) 1500 (600 Ca) MG tablet, , Disp: , Rfl:     carvedilol (COREG) 12.5 MG tablet, TAKE 1 TABLET BY MOUTH TWICE A DAY WITH FOOD, Disp: 180 tablet, Rfl: 1    Cholecalciferol 50 MCG (2000 UT) capsule, Vitamin D3 50 mcg (2,000 unit) oral capsule take 1 capsule by oral route daily   Active, Disp: , Rfl:     Cobalamin Combinations (B-12) 100-5000 MCG sublingual tablet, , Disp: , Rfl:     etodolac XL (LODINE XL) 400 MG 24 hr tablet, TAKE 1 TABLET BY MOUTH EVERY DAY, Disp: 90 tablet, Rfl: 1    famotidine (PEPCID) 40 MG tablet, TAKE 1 TABLET BY MOUTH TWICE A DAY, Disp: 180 tablet, Rfl: 1    glipizide (Glucotrol) 5 MG tablet, Take 1 tablet by mouth 2 (Two) Times a Day Before Meals., Disp: 180 tablet, Rfl: 1    Iron, Ferrous Sulfate, 325 (65 Fe) MG tablet, , Disp: , Rfl:     lidocaine (LIDODERM) 5 %, PLACE 1 PATCH ON THE SKIN AS DIRECTED BY PROVIDER DAILY. REMOVE & DISCARD PATCH WITHIN 12 HOURS OR AS DIRECTED BY MD, Disp: 90 patch, Rfl: 1    Multiple Vitamins-Minerals (MULTIVIT/MULTIMINERAL ADULT PO), , Disp: , Rfl:     ondansetron (ZOFRAN) 4 MG tablet, TAKE 1 TABLET BY MOUTH EVERY 8 HOURS AS NEEDED FOR NAUSEA AND VOMITING, Disp: 18 tablet, Rfl: 0    Psyllium 0.36 g capsule, , Disp: , Rfl:     spironolactone (ALDACTONE) 25 MG tablet, TAKE 1 TABLET BY MOUTH DAILY. ON MONDAY,WED, AND FRIDAY, Disp: 36 tablet, Rfl: 4    traMADol (ULTRAM) 50 MG tablet, TAKE 1 TABLET BY MOUTH EVERY 8 HOURS AS NEEDED FOR MODERATE PAIN, Disp: 90 tablet, Rfl: 0    docusate sodium (COLACE) 50 MG capsule, Take 1 capsule by mouth. (Patient not taking: Reported on 7/26/2024), Disp: , Rfl:      "doxycycline (VIBRAMYCIN) 100 MG capsule, Take 1 capsule by mouth 2 (Two) Times a Day. (Patient not taking: Reported on 7/26/2024), Disp: 20 capsule, Rfl: 0    linaclotide (Linzess) 145 MCG capsule capsule, Take 1 capsule by mouth Every Morning Before Breakfast for 90 days., Disp: 90 capsule, Rfl: 1    methylPREDNISolone (MEDROL) 4 MG dose pack, Take as directed on package instructions. (Patient not taking: Reported on 7/26/2024), Disp: 21 tablet, Rfl: 0     No Known Allergies    Family History   Problem Relation Age of Onset    Diabetes Mother     Hyperlipidemia Mother     Heart disease Mother     Hypertension Mother     Heart disease Father     COPD Father     Diabetes Maternal Aunt     Diabetes Paternal Aunt     Heart disease Maternal Grandmother     Diabetes Maternal Grandmother     Leukemia Maternal Grandfather     Stroke Maternal Grandfather     Thyroid disease Paternal Grandmother     Myelodysplastic syndrome Paternal Grandmother     Cerebral aneurysm Paternal Grandfather     Heart disease Paternal Grandfather     Colon cancer Neg Hx         Social History     Social History Narrative    Not on file       Objective       Vital Signs:   /62 (BP Location: Left arm, Patient Position: Sitting, Cuff Size: Adult)   Pulse 61   Ht 160 cm (62.99\")   Wt 110 kg (243 lb)   SpO2 100%   BMI 43.06 kg/m²       Physical Exam  Constitutional:       Appearance: Normal appearance. She is normal weight.   HENT:      Head: Normocephalic and atraumatic.      Nose: Nose normal.   Pulmonary:      Effort: Pulmonary effort is normal.   Skin:     General: Skin is warm and dry.   Neurological:      Mental Status: She is alert and oriented to person, place, and time. Mental status is at baseline.   Psychiatric:         Mood and Affect: Mood normal.         Behavior: Behavior normal.         Thought Content: Thought content normal.         Judgment: Judgment normal.         Result Review :       CBC w/diff          8/18/2023    " 14:42 2/1/2024    10:09   CBC w/Diff   WBC 10.53  6.65    RBC 4.40  4.12    Hemoglobin 13.1  12.1    Hematocrit 38.7  36.4    MCV 88.0  88.3    MCH 29.8  29.4    MCHC 33.9  33.2    RDW 12.6  12.4    Platelets 367  306    Neutrophil Rel % 58.1  65.2    Immature Granulocyte Rel % 0.3  0.2    Lymphocyte Rel % 32.3  26.6    Monocyte Rel % 5.6  5.1    Eosinophil Rel % 2.9  2.3    Basophil Rel % 0.8  0.6      CMP          8/18/2023    14:42 2/1/2024    10:09   CMP   Glucose 127  114    BUN 11  10    Creatinine 0.61  0.61    EGFR 105.1  105.1    Sodium 138  139    Potassium 4.6  4.4    Chloride 101  102    Calcium 9.5  9.1    Total Protein 6.9  6.5    Albumin 4.4  4.3    Globulin 2.5  2.2    Total Bilirubin 0.3  0.4    Alkaline Phosphatase 48  39    AST (SGOT) 14  9    ALT (SGPT) 11  8    Albumin/Globulin Ratio 1.8  2.0    BUN/Creatinine Ratio 18.0  16.4    Anion Gap 13.7  12.0              Assessment and Plan    Diagnoses and all orders for this visit:    1. Altered bowel habits (Primary)    2. Constipation, unspecified constipation type    3. Diarrhea, unspecified type    4. LLQ pain    5. Nausea    Other orders  -     linaclotide (Linzess) 145 MCG capsule capsule; Take 1 capsule by mouth Every Morning Before Breakfast for 90 days.  Dispense: 90 capsule; Refill: 1    Increase Linzess to 145, samples provided  Samples of Ibguard provided  Continue miriam supplement.    Follow Up   Return for Next scheduled follow up.  Patient was given instructions and counseling regarding her condition or for health maintenance advice. Please see specific information pulled into the AVS if appropriate.

## 2024-07-29 DIAGNOSIS — M54.2 CERVICALGIA: ICD-10-CM

## 2024-07-29 DIAGNOSIS — F41.9 ANXIETY: ICD-10-CM

## 2024-07-29 DIAGNOSIS — K21.9 GASTROESOPHAGEAL REFLUX DISEASE WITHOUT ESOPHAGITIS: ICD-10-CM

## 2024-07-29 DIAGNOSIS — M54.50 CHRONIC LOW BACK PAIN, UNSPECIFIED BACK PAIN LATERALITY, UNSPECIFIED WHETHER SCIATICA PRESENT: ICD-10-CM

## 2024-07-29 DIAGNOSIS — G89.29 CHRONIC LOW BACK PAIN, UNSPECIFIED BACK PAIN LATERALITY, UNSPECIFIED WHETHER SCIATICA PRESENT: ICD-10-CM

## 2024-07-29 DIAGNOSIS — E11.65 TYPE 2 DIABETES MELLITUS WITH HYPERGLYCEMIA, WITHOUT LONG-TERM CURRENT USE OF INSULIN: Primary | ICD-10-CM

## 2024-07-29 RX ORDER — GLIPIZIDE 5 MG/1
5 TABLET ORAL
Qty: 180 TABLET | Refills: 1 | Status: CANCELLED | OUTPATIENT
Start: 2024-07-29

## 2024-07-29 RX ORDER — FAMOTIDINE 40 MG/1
40 TABLET, FILM COATED ORAL 2 TIMES DAILY
Qty: 180 TABLET | Refills: 1 | Status: SHIPPED | OUTPATIENT
Start: 2024-07-29

## 2024-07-29 RX ORDER — BUSPIRONE HYDROCHLORIDE 5 MG/1
5 TABLET ORAL 3 TIMES DAILY
Qty: 270 TABLET | Refills: 1 | Status: SHIPPED | OUTPATIENT
Start: 2024-07-29

## 2024-07-29 RX ORDER — GLIPIZIDE 5 MG/1
5 TABLET ORAL
Qty: 180 TABLET | Refills: 1 | Status: SHIPPED | OUTPATIENT
Start: 2024-07-29

## 2024-07-29 RX ORDER — ETODOLAC 400 MG/1
400 TABLET, EXTENDED RELEASE ORAL DAILY
Qty: 90 TABLET | Refills: 1 | Status: SHIPPED | OUTPATIENT
Start: 2024-07-29

## 2024-07-29 NOTE — TELEPHONE ENCOUNTER
UPCOMING APPTS  With Family Medicine (HARLEY Dixon)  12/11/2024 at 10:45 AM  LAST OFFICE VISIT - THIS DEPT  6/12/2024 Tung Arias APRN       No

## 2024-08-16 DIAGNOSIS — M54.9 CHRONIC BACK PAIN, UNSPECIFIED BACK LOCATION, UNSPECIFIED BACK PAIN LATERALITY: ICD-10-CM

## 2024-08-16 DIAGNOSIS — G89.29 CHRONIC LOW BACK PAIN, UNSPECIFIED BACK PAIN LATERALITY, UNSPECIFIED WHETHER SCIATICA PRESENT: ICD-10-CM

## 2024-08-16 DIAGNOSIS — E78.2 MIXED HYPERLIPIDEMIA: ICD-10-CM

## 2024-08-16 DIAGNOSIS — I10 ESSENTIAL HYPERTENSION: ICD-10-CM

## 2024-08-16 DIAGNOSIS — E11.65 TYPE 2 DIABETES MELLITUS WITH HYPERGLYCEMIA, WITHOUT LONG-TERM CURRENT USE OF INSULIN: ICD-10-CM

## 2024-08-16 DIAGNOSIS — G89.29 CHRONIC BACK PAIN, UNSPECIFIED BACK LOCATION, UNSPECIFIED BACK PAIN LATERALITY: ICD-10-CM

## 2024-08-16 DIAGNOSIS — M54.50 CHRONIC LOW BACK PAIN, UNSPECIFIED BACK PAIN LATERALITY, UNSPECIFIED WHETHER SCIATICA PRESENT: ICD-10-CM

## 2024-08-16 DIAGNOSIS — M54.2 CERVICALGIA: ICD-10-CM

## 2024-08-16 NOTE — TELEPHONE ENCOUNTER
----- Message from [facility] [employer] sent at 8/16/2024 10:32 AM EDT -----  Regarding: New RX's for Mail order  Contact: [phone]  I am switching a few of my rx's to Mail order because they are cheaper. The Mail order is: [facility]. Phone # for doctors: [phone] & fax: [phone].    Rx's to transfer:  Tramadol 50 mg 3x day. Must have electronic rx for this one.  Carvedilol 12.5 mg 2x day, EtodolacER 400 mg, Glipizide 5 mg Gen 1000 2x day & Atorvastatin 10 mg.    All other rx's will stay at [facility] in [place], [place]    Thanks for all you do, Marlo

## 2024-08-17 RX ORDER — TRAMADOL HYDROCHLORIDE 50 MG/1
50 TABLET ORAL EVERY 8 HOURS PRN
Qty: 90 TABLET | Refills: 0 | Status: SHIPPED | OUTPATIENT
Start: 2024-08-17

## 2024-08-17 RX ORDER — CARVEDILOL 12.5 MG/1
12.5 TABLET ORAL 2 TIMES DAILY WITH MEALS
Qty: 180 TABLET | Refills: 1 | Status: SHIPPED | OUTPATIENT
Start: 2024-08-17

## 2024-08-17 RX ORDER — ATORVASTATIN CALCIUM 10 MG/1
10 TABLET, FILM COATED ORAL
Qty: 90 TABLET | Refills: 1 | Status: SHIPPED | OUTPATIENT
Start: 2024-08-17

## 2024-08-17 RX ORDER — GLIPIZIDE 5 MG/1
5 TABLET ORAL
Qty: 180 TABLET | Refills: 1 | Status: SHIPPED | OUTPATIENT
Start: 2024-08-17

## 2024-08-17 RX ORDER — ETODOLAC 400 MG/1
400 TABLET, EXTENDED RELEASE ORAL DAILY
Qty: 90 TABLET | Refills: 1 | Status: SHIPPED | OUTPATIENT
Start: 2024-08-17

## 2024-09-22 DIAGNOSIS — G89.29 CHRONIC BACK PAIN, UNSPECIFIED BACK LOCATION, UNSPECIFIED BACK PAIN LATERALITY: ICD-10-CM

## 2024-09-22 DIAGNOSIS — M54.9 CHRONIC BACK PAIN, UNSPECIFIED BACK LOCATION, UNSPECIFIED BACK PAIN LATERALITY: ICD-10-CM

## 2024-09-23 RX ORDER — TRAMADOL HYDROCHLORIDE 50 MG/1
50 TABLET ORAL EVERY 8 HOURS PRN
Qty: 90 TABLET | Refills: 0 | Status: SHIPPED | OUTPATIENT
Start: 2024-09-23

## 2024-10-08 ENCOUNTER — PATIENT MESSAGE (OUTPATIENT)
Dept: FAMILY MEDICINE CLINIC | Facility: CLINIC | Age: 59
End: 2024-10-08
Payer: COMMERCIAL

## 2024-10-16 ENCOUNTER — OFFICE VISIT (OUTPATIENT)
Dept: ORTHOPEDIC SURGERY | Facility: CLINIC | Age: 59
End: 2024-10-16
Payer: COMMERCIAL

## 2024-10-16 VITALS
WEIGHT: 243 LBS | SYSTOLIC BLOOD PRESSURE: 116 MMHG | HEART RATE: 62 BPM | OXYGEN SATURATION: 95 % | BODY MASS INDEX: 43.05 KG/M2 | HEIGHT: 63 IN | DIASTOLIC BLOOD PRESSURE: 82 MMHG

## 2024-10-16 DIAGNOSIS — G56.03 CARPAL TUNNEL SYNDROME, BILATERAL: Primary | ICD-10-CM

## 2024-10-16 RX ORDER — LIDOCAINE HYDROCHLORIDE 10 MG/ML
1 INJECTION, SOLUTION INFILTRATION; PERINEURAL
Status: COMPLETED | OUTPATIENT
Start: 2024-10-16 | End: 2024-10-16

## 2024-10-16 RX ORDER — TRIAMCINOLONE ACETONIDE 40 MG/ML
40 INJECTION, SUSPENSION INTRA-ARTICULAR; INTRAMUSCULAR
Status: COMPLETED | OUTPATIENT
Start: 2024-10-16 | End: 2024-10-16

## 2024-10-16 RX ADMIN — LIDOCAINE HYDROCHLORIDE 1 ML: 10 INJECTION, SOLUTION INFILTRATION; PERINEURAL at 13:21

## 2024-10-16 RX ADMIN — TRIAMCINOLONE ACETONIDE 40 MG: 40 INJECTION, SUSPENSION INTRA-ARTICULAR; INTRAMUSCULAR at 13:21

## 2024-10-16 NOTE — PROGRESS NOTES
"Chief Complaint  Pain and Follow-up of the Right Wrist and Pain and Follow-up of the Left Wrist    Subjective          Marlo Barney presents to [facility] for a follow up for her bilateral wrist.     History of Present Illness    The patient presents here today for a follow up for bilateral wrist. She has been treating her bilateral carpal tunnel conservatively. She was last seen in the office on 24 where she had bilateral carpal tunnel injections. She reports these gave her great relief and is requesting repeat injections today in the office. She denies any new injury or falls since her last visit.     No Known Allergies     Social History     Socioeconomic History    Marital status:    Tobacco Use    Smoking status: Former     Current packs/day: 0.00     Average packs/day: 0.3 packs/day for 1 year (0.3 ttl pk-yrs)     Types: Cigarettes     Start date: 1986     Quit date: 1987     Years since quittin.1     Passive exposure: Past    Smokeless tobacco: Never   Vaping Use    Vaping status: Never Used   Substance and Sexual Activity    Alcohol use: Yes     Comment: occationally    Drug use: Never    Sexual activity: Not Currently     Partners: Male        I reviewed the patient's chief complaint, history of present illness, review of systems, past medical history, surgical history, family history, social history, medications, and allergy list.     REVIEW OF SYSTEMS    Constitutional: Denies fevers, chills, weight loss  Cardiovascular: Denies chest pain, shortness of breath  Skin: Denies rashes, acute skin changes  Neurologic: Denies headache, loss of consciousness  MSK: bilateral hand pain       Objective   Vital Signs:   /82   Pulse 62   Ht 160 cm (63\")   Wt 110 kg (243 lb)   SpO2 95%   BMI 43.05 kg/m²     Body mass index is 43.05 kg/m².    Physical Exam    General: Alert. No acute distress.   Bilateral Hands: Skin intact. Well perfused. No " muscle atrophy.     Medium Joint Arthrocentesis  Date/Time: 10/16/2024 1:21 PM  Consent given by: patient  Site marked: site marked  Timeout: Immediately prior to procedure a time out was called to verify the correct patient, procedure, equipment, support staff and site/side marked as required   Procedure Details  Location: wrist (right) -   Needle size: 23 G  Medications administered: 1 mL lidocaine 1 %; 40 mg triamcinolone acetonide 40 MG/ML  Patient tolerance: patient tolerated the procedure well with no immediate complications      Medium Joint Arthrocentesis  Date/Time: 10/16/2024 1:21 PM  Consent given by: patient  Site marked: site marked  Timeout: Immediately prior to procedure a time out was called to verify the correct patient, procedure, equipment, support staff and site/side marked as required   Supporting Documentation  Indications: pain   Procedure Details  Location: wrist (left) -   Needle size: 23 G  Medications administered: 1 mL lidocaine 1 %; 40 mg triamcinolone acetonide 40 MG/ML  Patient tolerance: patient tolerated the procedure well with no immediate complications    This injection documentation was Scribed for Tereso Coppola MD by Dennise Marino MA.  10/16/24   13:22 EDT    Imaging Results (Most Recent)       None                     Assessment and Plan        No results found.     Diagnoses and all orders for this visit:    1. Carpal tunnel syndrome, bilateral (Primary)        The patient presents here today for a follow up for bilateral carpal tunnel.     Discussed the risks and benefits of bilateral carpal tunnel injections today in the office. Patient expressed understanding and wishes to proceed. She tolerated the injections well and without any complications.     Call or return if worsening symptoms.    Scribed for Tereso Coppola MD by Tala Acevedo  10/16/2024   13:05 EDT     Follow Up       3 months     Patient was given instructions and counseling regarding her condition or for  health maintenance advice. Please see specific information pulled into the AVS if appropriate.       I have personally performed the services described in this document as scribed by the above individual and it is both accurate and complete. Tereso Coppola MD 10/16/24 14:25 EDT

## 2024-10-20 DIAGNOSIS — G89.29 CHRONIC BACK PAIN, UNSPECIFIED BACK LOCATION, UNSPECIFIED BACK PAIN LATERALITY: ICD-10-CM

## 2024-10-20 DIAGNOSIS — M54.9 CHRONIC BACK PAIN, UNSPECIFIED BACK LOCATION, UNSPECIFIED BACK PAIN LATERALITY: ICD-10-CM

## 2024-10-20 DIAGNOSIS — F41.9 ANXIETY: ICD-10-CM

## 2024-10-21 RX ORDER — BUSPIRONE HYDROCHLORIDE 5 MG/1
5 TABLET ORAL 3 TIMES DAILY
Qty: 270 TABLET | Refills: 1 | Status: SHIPPED | OUTPATIENT
Start: 2024-10-21

## 2024-10-21 RX ORDER — TRAMADOL HYDROCHLORIDE 50 MG/1
50 TABLET ORAL EVERY 8 HOURS PRN
Qty: 90 TABLET | Refills: 0 | Status: SHIPPED | OUTPATIENT
Start: 2024-10-21

## 2024-10-21 NOTE — TELEPHONE ENCOUNTER
UPCOMING APPTS  With Family Medicine ([facility])  12/11/2024 at 10:45 AM  LAST OFFICE VISIT - THIS DEPT  6/12/2024 Tung Arias APRN

## 2024-10-28 ENCOUNTER — OFFICE VISIT (OUTPATIENT)
Dept: ORTHOPEDIC SURGERY | Facility: CLINIC | Age: 59
End: 2024-10-28
Payer: COMMERCIAL

## 2024-10-28 VITALS
HEART RATE: 76 BPM | HEIGHT: 63 IN | SYSTOLIC BLOOD PRESSURE: 126 MMHG | DIASTOLIC BLOOD PRESSURE: 74 MMHG | WEIGHT: 243 LBS | OXYGEN SATURATION: 97 % | BODY MASS INDEX: 43.05 KG/M2

## 2024-10-28 DIAGNOSIS — M25.511 RIGHT SHOULDER PAIN, UNSPECIFIED CHRONICITY: Primary | ICD-10-CM

## 2024-10-28 DIAGNOSIS — M25.512 LEFT SHOULDER PAIN, UNSPECIFIED CHRONICITY: ICD-10-CM

## 2024-10-28 DIAGNOSIS — M75.41 IMPINGEMENT SYNDROME OF RIGHT SHOULDER: ICD-10-CM

## 2024-10-28 DIAGNOSIS — E66.01 OBESITY, CLASS III, BMI 40-49.9 (MORBID OBESITY): ICD-10-CM

## 2024-10-28 DIAGNOSIS — M75.42 IMPINGEMENT SYNDROME OF LEFT SHOULDER: ICD-10-CM

## 2024-10-28 PROCEDURE — 20610 DRAIN/INJ JOINT/BURSA W/O US: CPT | Performed by: STUDENT IN AN ORGANIZED HEALTH CARE EDUCATION/TRAINING PROGRAM

## 2024-10-28 RX ORDER — TRIAMCINOLONE ACETONIDE 40 MG/ML
40 INJECTION, SUSPENSION INTRA-ARTICULAR; INTRAMUSCULAR
Status: COMPLETED | OUTPATIENT
Start: 2024-10-28 | End: 2024-10-28

## 2024-10-28 RX ORDER — LIDOCAINE HYDROCHLORIDE 10 MG/ML
5 INJECTION, SOLUTION EPIDURAL; INFILTRATION; INTRACAUDAL; PERINEURAL
Status: COMPLETED | OUTPATIENT
Start: 2024-10-28 | End: 2024-10-28

## 2024-10-28 RX ADMIN — LIDOCAINE HYDROCHLORIDE 5 ML: 10 INJECTION, SOLUTION EPIDURAL; INFILTRATION; INTRACAUDAL; PERINEURAL at 13:51

## 2024-10-28 RX ADMIN — TRIAMCINOLONE ACETONIDE 40 MG: 40 INJECTION, SUSPENSION INTRA-ARTICULAR; INTRAMUSCULAR at 13:52

## 2024-10-28 RX ADMIN — LIDOCAINE HYDROCHLORIDE 5 ML: 10 INJECTION, SOLUTION EPIDURAL; INFILTRATION; INTRACAUDAL; PERINEURAL at 13:52

## 2024-10-28 RX ADMIN — TRIAMCINOLONE ACETONIDE 40 MG: 40 INJECTION, SUSPENSION INTRA-ARTICULAR; INTRAMUSCULAR at 13:51

## 2024-10-28 NOTE — PROGRESS NOTES
"Chief Complaint  Follow-up and Pain of the Left Shoulder and Follow-up and Pain of the Right Shoulder    Subjective          Marlo Barney presents to [facility] for a follow up for bilateral shoulder.     History of Present Illness    The patient presents here today for a follow up for bilateral shoulder. She has been treating her bilateral shoulder impingement conservatively. She has had bilateral shoulder steroid injections in the past with minimal relief. She has numbness and tingling to her hands. She locates her pain to the anterior shoulder and chest region. She has pain at nighttime.     No Known Allergies     Social History     Socioeconomic History    Marital status:    Tobacco Use    Smoking status: Former     Current packs/day: 0.00     Average packs/day: 0.3 packs/day for 1 year (0.3 ttl pk-yrs)     Types: Cigarettes     Start date: 1986     Quit date: 1987     Years since quittin.2     Passive exposure: Past    Smokeless tobacco: Never   Vaping Use    Vaping status: Never Used   Substance and Sexual Activity    Alcohol use: Yes     Comment: occationally    Drug use: Never    Sexual activity: Not Currently     Partners: Male        I reviewed the patient's chief complaint, history of present illness, review of systems, past medical history, surgical history, family history, social history, medications, and allergy list.     REVIEW OF SYSTEMS    Constitutional: Denies fevers, chills, weight loss  Cardiovascular: Denies chest pain, shortness of breath  Skin: Denies rashes, acute skin changes  Neurologic: Denies headache, loss of consciousness  MSK: Bilateral shoulder pain       Objective   Vital Signs:   /74   Pulse 76   Ht 160 cm (63\")   Wt 110 kg (243 lb)   SpO2 97%   BMI 43.05 kg/m²     Body mass index is 43.05 kg/m².    Physical Exam    General: Alert. No acute distress.   Bilateral upper extremity: forward elevation  to 180 degrees " with pain, external rotation  at the side 60 degrees, internal rotation to waist line, positive  impingement, 5/5 rotator cuff strength, neurovascularly intact, sensation intact to the medial, radial and ulnar nerve.      Large Joint Arthrocentesis  Date/Time: 10/28/2024 1:51 PM  Consent given by: patient  Site marked: site marked  Timeout: Immediately prior to procedure a time out was called to verify the correct patient, procedure, equipment, support staff and site/side marked as required   Supporting Documentation  Indications: pain   Procedure Details  Location: shoulder (RIGHT) -   Needle gauge: 21 G.  Medications administered: 5 mL lidocaine PF 1% 1 %; 40 mg triamcinolone acetonide 40 MG/ML  Patient tolerance: patient tolerated the procedure well with no immediate complications      Large Joint Arthrocentesis  Date/Time: 10/28/2024 1:52 PM  Consent given by: patient  Site marked: site marked  Timeout: Immediately prior to procedure a time out was called to verify the correct patient, procedure, equipment, support staff and site/side marked as required   Supporting Documentation  Indications: pain   Procedure Details  Location: shoulder (LEFT) -   Needle gauge: 21 G.  Medications administered: 5 mL lidocaine PF 1% 1 %; 40 mg triamcinolone acetonide 40 MG/ML  Patient tolerance: patient tolerated the procedure well with no immediate complications      This injection documentation was Scribed for Tereso Coppola MD by Daysi Cabrera.  10/28/24   13:52 EDT    Imaging Results (Most Recent)       Procedure Component Value Units Date/Time    XR Shoulder 2+ View Left [[id number]] Resulted: 10/28/24 1347     Updated: 10/28/24 1347    Narrative:      Indications: Left shoulder pain    Views: AP, Grashey, Scap Y, x-ray left shoulder    Findings: Mild degenerative changes are present at both the glenohumeral   and AC joints.  Glenohumeral joint reduced.  No fractures.  Calcific   changes along the rotator cuff  insertion.    Comparative Data: Comparative data found and reviewed today    XR Shoulder 2+ View Right [[id number]] Resulted: 10/28/24 1346     Updated: 10/28/24 1346    Narrative:      Indications: Follow-up right shoulder pain    Views: AP, Grashey, Scap Y, axillary right shoulder    Findings: Mild degenerative changes are present at both the glenohumeral   and AC joints.  Calcific changes are noted in the rotator cuff insertion.    No fractures.  Glenohumeral joint reduced.    Comparative Data: Comparative data found and reviewed today                     Assessment and Plan        XR Shoulder 2+ View Left    Result Date: 10/28/2024  Narrative: Indications: Left shoulder pain Views: AP, Grashey, Scap Y, x-ray left shoulder Findings: Mild degenerative changes are present at both the glenohumeral and AC joints.  Glenohumeral joint reduced.  No fractures.  Calcific changes along the rotator cuff insertion. Comparative Data: Comparative data found and reviewed today    XR Shoulder 2+ View Right    Result Date: 10/28/2024  Narrative: Indications: Follow-up right shoulder pain Views: AP, Grashey, Scap Y, axillary right shoulder Findings: Mild degenerative changes are present at both the glenohumeral and AC joints.  Calcific changes are noted in the rotator cuff insertion.  No fractures.  Glenohumeral joint reduced. Comparative Data: Comparative data found and reviewed today      Diagnoses and all orders for this visit:    1. Right shoulder pain, unspecified chronicity (Primary)  -     XR Shoulder 2+ View Right    2. Left shoulder pain, unspecified chronicity  -     XR Shoulder 2+ View Left    3. Impingement syndrome of left shoulder    4. Impingement syndrome of right shoulder    5. Obesity, Class III, BMI 40-49.9 (morbid obesity)    Other orders  -     Large Joint Arthrocentesis  -     Large Joint Arthrocentesis      The patient presents here today for a follow up for her bilateral shoulder. X-rays were obtained in the  office today and these were reviewed today.     Discussed the risks and benefits of bilateral shoulder steroid injections today in the office. Patient expressed understanding and wishes to proceed. She tolerated the injections well and without any complications.     Home exercises given today.     She is not able to take anti inflammatories due to a gastric sleeve.     Call or return if worsening symptoms.    Scribed for Tereso Coppola MD by Tala Acevedo  10/28/2024   13:34 EDT         Follow Up       PRN     Patient was given instructions and counseling regarding her condition or for health maintenance advice. Please see specific information pulled into the AVS if appropriate.       I have personally performed the services described in this document as scribed by the above individual and it is both accurate and complete. Tereso Coppola MD 10/28/24 14:28 EDT

## 2024-10-30 ENCOUNTER — OFFICE VISIT (OUTPATIENT)
Dept: ORTHOPEDIC SURGERY | Facility: CLINIC | Age: 59
End: 2024-10-30
Payer: COMMERCIAL

## 2024-10-30 VITALS
DIASTOLIC BLOOD PRESSURE: 75 MMHG | WEIGHT: 243 LBS | BODY MASS INDEX: 43.05 KG/M2 | HEIGHT: 63 IN | HEART RATE: 79 BPM | OXYGEN SATURATION: 96 % | SYSTOLIC BLOOD PRESSURE: 112 MMHG

## 2024-10-30 DIAGNOSIS — M70.62 TROCHANTERIC BURSITIS OF LEFT HIP: ICD-10-CM

## 2024-10-30 DIAGNOSIS — M70.61 TROCHANTERIC BURSITIS OF RIGHT HIP: ICD-10-CM

## 2024-10-30 DIAGNOSIS — M25.551 RIGHT HIP PAIN: Primary | ICD-10-CM

## 2024-10-30 DIAGNOSIS — M25.552 LEFT HIP PAIN: ICD-10-CM

## 2024-10-30 RX ORDER — LIDOCAINE HYDROCHLORIDE 10 MG/ML
5 INJECTION, SOLUTION INFILTRATION; PERINEURAL
Status: COMPLETED | OUTPATIENT
Start: 2024-10-30 | End: 2024-10-30

## 2024-10-30 RX ORDER — TRIAMCINOLONE ACETONIDE 40 MG/ML
40 INJECTION, SUSPENSION INTRA-ARTICULAR; INTRAMUSCULAR
Status: COMPLETED | OUTPATIENT
Start: 2024-10-30 | End: 2024-10-30

## 2024-10-30 RX ADMIN — TRIAMCINOLONE ACETONIDE 40 MG: 40 INJECTION, SUSPENSION INTRA-ARTICULAR; INTRAMUSCULAR at 13:35

## 2024-10-30 RX ADMIN — TRIAMCINOLONE ACETONIDE 40 MG: 40 INJECTION, SUSPENSION INTRA-ARTICULAR; INTRAMUSCULAR at 13:33

## 2024-10-30 RX ADMIN — LIDOCAINE HYDROCHLORIDE 5 ML: 10 INJECTION, SOLUTION INFILTRATION; PERINEURAL at 13:35

## 2024-10-30 RX ADMIN — LIDOCAINE HYDROCHLORIDE 5 ML: 10 INJECTION, SOLUTION INFILTRATION; PERINEURAL at 13:33

## 2024-10-30 NOTE — PROGRESS NOTES
"Chief Complaint  Initial Evaluation and Pain of the Right Hip and Initial Evaluation and Pain of the Left Hip    Subjective          Marlo Barney presents to [facility] for an evaluation  of her bilateral hip.     History of Present Illness    The patient presents here today for an evaluation of bilateral hip. Her hips have been bothering her for several months. She locates her pain to the lateral hip. She reports some popping to her hips. She reports pain at nighttime and pain when laying on her hips. She denies any specific injury, trauma, falls or prior surgery to her hips.     No Known Allergies     Social History     Socioeconomic History    Marital status:    Tobacco Use    Smoking status: Former     Current packs/day: 0.00     Average packs/day: 0.3 packs/day for 1 year (0.3 ttl pk-yrs)     Types: Cigarettes     Start date: 1986     Quit date: 1987     Years since quittin.2     Passive exposure: Past    Smokeless tobacco: Never   Vaping Use    Vaping status: Never Used   Substance and Sexual Activity    Alcohol use: Yes     Comment: occationally    Drug use: Never    Sexual activity: Not Currently     Partners: Male        I reviewed the patient's chief complaint, history of present illness, review of systems, past medical history, surgical history, family history, social history, medications, and allergy list.     REVIEW OF SYSTEMS    Constitutional: Denies fevers, chills, weight loss  Cardiovascular: Denies chest pain, shortness of breath  Skin: Denies rashes, acute skin changes  Neurologic: Denies headache, loss of consciousness  MSK: bilateral knee pain       Objective   Vital Signs:   /75   Pulse 79   Ht 160 cm (63\")   Wt 110 kg (243 lb)   SpO2 96%   BMI 43.05 kg/m²     Body mass index is 43.05 kg/m².    Physical Exam    General: Alert. No acute distress.   Bilateral hip extremity: hip flexion to 90 degrees, external rotation to 45 " degrees, internal rotation to 20 degrees, tender to the lateral hip, 5/5 hip flexion and abduction, distal neurovascularly intact, calf soft, positive  pulses, positive EHL, FHL, GS, and TA. Sensation intact to all 5 nerves of the foot.      Large Joint Arthrocentesis: R greater trochanteric bursa  Date/Time: 10/30/2024 1:33 PM  Consent given by: patient  Site marked: site marked  Timeout: Immediately prior to procedure a time out was called to verify the correct patient, procedure, equipment, support staff and site/side marked as required   Supporting Documentation  Indications: pain   Procedure Details  Location: hip - R greater trochanteric bursa  Needle gauge: 21 G.  Medications administered: 5 mL lidocaine 1 %; 40 mg triamcinolone acetonide 40 MG/ML  Patient tolerance: patient tolerated the procedure well with no immediate complications      Large Joint Arthrocentesis: L greater trochanteric bursa  Date/Time: 10/30/2024 1:35 PM  Consent given by: patient  Site marked: site marked  Timeout: Immediately prior to procedure a time out was called to verify the correct patient, procedure, equipment, support staff and site/side marked as required   Supporting Documentation  Indications: pain   Procedure Details  Location: hip - L greater trochanteric bursa  Needle gauge: 21 G.  Medications administered: 5 mL lidocaine 1 %; 40 mg triamcinolone acetonide 40 MG/ML  Patient tolerance: patient tolerated the procedure well with no immediate complications      This injection documentation was Scribed for Tereso Coppola MD by Daysi Cabrera.  10/30/24   13:35 EDT    Imaging Results (Most Recent)       Procedure Component Value Units Date/Time    XR Hips Bilateral With or Without Pelvis 3-4 View [[id number]] Resulted: 10/30/24 1313     Updated: 10/30/24 1313    Narrative:      Indications: Bilateral hip pain    Views: AP and frog lateral bilateral hips    Findings: Enthesophyte changes over the greater trochanters bilaterally.     Hips are reduced.  Articular height appears maintained within the hip   joint.  Mild marginal spurring present.  No fracture seen.    Comparative Data: Comparative data found and reviewed today                  Assessment and Plan        XR Hips Bilateral With or Without Pelvis 3-4 View    Result Date: 10/30/2024  Narrative: Indications: Bilateral hip pain Views: AP and frog lateral bilateral hips Findings: Enthesophyte changes over the greater trochanters bilaterally.  Hips are reduced.  Articular height appears maintained within the hip joint.  Mild marginal spurring present.  No fracture seen. Comparative Data: Comparative data found and reviewed today    XR Shoulder 2+ View Left    Result Date: 10/28/2024  Narrative: Indications: Left shoulder pain Views: AP, Grashey, Scap Y, x-ray left shoulder Findings: Mild degenerative changes are present at both the glenohumeral and AC joints.  Glenohumeral joint reduced.  No fractures.  Calcific changes along the rotator cuff insertion. Comparative Data: Comparative data found and reviewed today    XR Shoulder 2+ View Right    Result Date: 10/28/2024  Narrative: Indications: Follow-up right shoulder pain Views: AP, Grashey, Scap Y, axillary right shoulder Findings: Mild degenerative changes are present at both the glenohumeral and AC joints.  Calcific changes are noted in the rotator cuff insertion.  No fractures.  Glenohumeral joint reduced. Comparative Data: Comparative data found and reviewed today      Diagnoses and all orders for this visit:    1. Right hip pain (Primary)  -     XR Hips Bilateral With or Without Pelvis 3-4 View    2. Left hip pain  -     XR Hips Bilateral With or Without Pelvis 3-4 View    3. Trochanteric bursitis of left hip    4. Trochanteric bursitis of right hip    Other orders  -     Large Joint Arthrocentesis: R greater trochanteric bursa  -     Large Joint Arthrocentesis: L greater trochanteric bursa      The patient presents here today for  an evaluation  of bilateral hip. X-rays were obtained in the office today and these were reviewed today.     Discussed the risks and benefits of bilateral hip steroid injections today in the office. Patient expressed understanding and wishes to proceed. She tolerated the injections well and without any complications.     Home exercises given today and will continue current medications     Call or return if worsening symptoms.    Scribed for Tereso Coppola MD by Tala Acevedo  10/30/2024   13:07 EDT   2      Follow Up     PRN     Patient was given instructions and counseling regarding her condition or for health maintenance advice. Please see specific information pulled into the AVS if appropriate.     I have personally performed the services described in this document as scribed by the above individual and it is both accurate and complete. Tereso Coppola MD 10/30/24 16:06 EDT

## 2024-11-06 ENCOUNTER — PATIENT MESSAGE (OUTPATIENT)
Dept: FAMILY MEDICINE CLINIC | Facility: CLINIC | Age: 59
End: 2024-11-06
Payer: COMMERCIAL

## 2024-11-06 DIAGNOSIS — M54.2 CERVICALGIA: ICD-10-CM

## 2024-11-06 DIAGNOSIS — M54.50 CHRONIC LOW BACK PAIN, UNSPECIFIED BACK PAIN LATERALITY, UNSPECIFIED WHETHER SCIATICA PRESENT: Primary | ICD-10-CM

## 2024-11-06 DIAGNOSIS — G89.29 CHRONIC LOW BACK PAIN, UNSPECIFIED BACK PAIN LATERALITY, UNSPECIFIED WHETHER SCIATICA PRESENT: Primary | ICD-10-CM

## 2024-11-21 ENCOUNTER — HOSPITAL ENCOUNTER (OUTPATIENT)
Dept: GENERAL RADIOLOGY | Facility: HOSPITAL | Age: 59
Discharge: HOME | End: 2024-11-21
Payer: COMMERCIAL

## 2024-11-21 ENCOUNTER — OFFICE VISIT (OUTPATIENT)
Dept: GASTROENTEROLOGY | Facility: CLINIC | Age: 59
End: 2024-11-21
Payer: COMMERCIAL

## 2024-11-21 ENCOUNTER — LAB (OUTPATIENT)
Dept: LAB | Facility: HOSPITAL | Age: 59
End: 2024-11-21
Payer: COMMERCIAL

## 2024-11-21 VITALS
BODY MASS INDEX: 42.7 KG/M2 | HEART RATE: 69 BPM | HEIGHT: 63 IN | OXYGEN SATURATION: 100 % | SYSTOLIC BLOOD PRESSURE: 117 MMHG | DIASTOLIC BLOOD PRESSURE: 66 MMHG | WEIGHT: 241 LBS

## 2024-11-21 DIAGNOSIS — Z86.0100 HISTORY OF COLON POLYPS: ICD-10-CM

## 2024-11-21 DIAGNOSIS — E66.9 TYPE 2 DIABETES MELLITUS WITH OBESITY: ICD-10-CM

## 2024-11-21 DIAGNOSIS — M54.2 CERVICALGIA: ICD-10-CM

## 2024-11-21 DIAGNOSIS — M54.50 CHRONIC LOW BACK PAIN, UNSPECIFIED BACK PAIN LATERALITY, UNSPECIFIED WHETHER SCIATICA PRESENT: ICD-10-CM

## 2024-11-21 DIAGNOSIS — K58.9 IRRITABLE BOWEL SYNDROME, UNSPECIFIED TYPE: Primary | ICD-10-CM

## 2024-11-21 DIAGNOSIS — E11.69 TYPE 2 DIABETES MELLITUS WITH OBESITY: ICD-10-CM

## 2024-11-21 DIAGNOSIS — G89.29 CHRONIC LOW BACK PAIN, UNSPECIFIED BACK PAIN LATERALITY, UNSPECIFIED WHETHER SCIATICA PRESENT: ICD-10-CM

## 2024-11-21 LAB
ALBUMIN SERPL-MCNC: 3.8 G/DL (ref 3.5–5.2)
ALBUMIN UR-MCNC: 5.1 MG/DL
ALBUMIN/GLOB SERPL: 1.2 G/DL
ALP SERPL-CCNC: 43 U/L (ref 39–117)
ALT SERPL W P-5'-P-CCNC: 15 U/L (ref 1–33)
ANION GAP SERPL CALCULATED.3IONS-SCNC: 9.3 MMOL/L (ref 5–15)
AST SERPL-CCNC: 10 U/L (ref 1–32)
BACTERIA UR QL AUTO: ABNORMAL /HPF
BASOPHILS # BLD AUTO: 0.02 10*3/MM3 (ref 0–0.2)
BASOPHILS NFR BLD AUTO: 0.2 % (ref 0–1.5)
BILIRUB SERPL-MCNC: 0.5 MG/DL (ref 0–1.2)
BILIRUB UR QL STRIP: ABNORMAL
BUN SERPL-MCNC: 18 MG/DL (ref 6–20)
BUN/CREAT SERPL: 19.8 (ref 7–25)
CALCIUM SPEC-SCNC: 9.6 MG/DL (ref 8.6–10.5)
CHLORIDE SERPL-SCNC: 102 MMOL/L (ref 98–107)
CHOLEST SERPL-MCNC: 188 MG/DL (ref 0–200)
CLARITY UR: ABNORMAL
CO2 SERPL-SCNC: 25.7 MMOL/L (ref 22–29)
COD CRY URNS QL: ABNORMAL /HPF
COLOR UR: ABNORMAL
CREAT SERPL-MCNC: 0.91 MG/DL (ref 0.57–1)
CREAT UR-MCNC: 457.4 MG/DL
DEPRECATED RDW RBC AUTO: 41 FL (ref 37–54)
EGFRCR SERPLBLD CKD-EPI 2021: 73.7 ML/MIN/1.73
EOSINOPHIL # BLD AUTO: 0.05 10*3/MM3 (ref 0–0.4)
EOSINOPHIL NFR BLD AUTO: 0.5 % (ref 0.3–6.2)
ERYTHROCYTE [DISTWIDTH] IN BLOOD BY AUTOMATED COUNT: 12.2 % (ref 12.3–15.4)
GLOBULIN UR ELPH-MCNC: 3.1 GM/DL
GLUCOSE SERPL-MCNC: 207 MG/DL (ref 65–99)
GLUCOSE UR STRIP-MCNC: ABNORMAL MG/DL
GRAN CASTS URNS QL MICRO: ABNORMAL /LPF
HBA1C MFR BLD: 8.1 % (ref 4.8–5.6)
HCT VFR BLD AUTO: 40.9 % (ref 34–46.6)
HDLC SERPL-MCNC: 47 MG/DL (ref 40–60)
HGB BLD-MCNC: 13.7 G/DL (ref 12–15.9)
HGB UR QL STRIP.AUTO: NEGATIVE
HOLD SPECIMEN: NORMAL
HYALINE CASTS UR QL AUTO: ABNORMAL /LPF
IMM GRANULOCYTES # BLD AUTO: 0.04 10*3/MM3 (ref 0–0.05)
IMM GRANULOCYTES NFR BLD AUTO: 0.4 % (ref 0–0.5)
KETONES UR QL STRIP: ABNORMAL
LDLC SERPL CALC-MCNC: 116 MG/DL (ref 0–100)
LDLC/HDLC SERPL: 2.39 {RATIO}
LEUKOCYTE ESTERASE UR QL STRIP.AUTO: ABNORMAL
LYMPHOCYTES # BLD AUTO: 1.94 10*3/MM3 (ref 0.7–3.1)
LYMPHOCYTES NFR BLD AUTO: 21.1 % (ref 19.6–45.3)
MCH RBC QN AUTO: 31.1 PG (ref 26.6–33)
MCHC RBC AUTO-ENTMCNC: 33.5 G/DL (ref 31.5–35.7)
MCV RBC AUTO: 92.7 FL (ref 79–97)
MICROALBUMIN/CREAT UR: 11.1 MG/G (ref 0–29)
MONOCYTES # BLD AUTO: 0.56 10*3/MM3 (ref 0.1–0.9)
MONOCYTES NFR BLD AUTO: 6.1 % (ref 5–12)
NEUTROPHILS NFR BLD AUTO: 6.6 10*3/MM3 (ref 1.7–7)
NEUTROPHILS NFR BLD AUTO: 71.7 % (ref 42.7–76)
NITRITE UR QL STRIP: POSITIVE
NRBC BLD AUTO-RTO: 0 /100 WBC (ref 0–0.2)
PH UR STRIP.AUTO: 5.5 [PH] (ref 5–8)
PLATELET # BLD AUTO: 273 10*3/MM3 (ref 140–450)
PMV BLD AUTO: 9.7 FL (ref 6–12)
POTASSIUM SERPL-SCNC: 4.7 MMOL/L (ref 3.5–5.2)
PROT SERPL-MCNC: 6.9 G/DL (ref 6–8.5)
PROT UR QL STRIP: ABNORMAL
RBC # BLD AUTO: 4.41 10*6/MM3 (ref 3.77–5.28)
RBC # UR STRIP: ABNORMAL /HPF
REF LAB TEST METHOD: ABNORMAL
SODIUM SERPL-SCNC: 137 MMOL/L (ref 136–145)
SP GR UR STRIP: >1.03 (ref 1–1.03)
SQUAMOUS #/AREA URNS HPF: ABNORMAL /HPF
STARCH GRANULES URNS QL MICRO: ABNORMAL /HPF
TRIGL SERPL-MCNC: 143 MG/DL (ref 0–150)
UROBILINOGEN UR QL STRIP: ABNORMAL
VLDLC SERPL-MCNC: 25 MG/DL (ref 5–40)
WBC # UR STRIP: ABNORMAL /HPF
WBC NRBC COR # BLD AUTO: 9.21 10*3/MM3 (ref 3.4–10.8)

## 2024-11-21 PROCEDURE — 87086 URINE CULTURE/COLONY COUNT: CPT

## 2024-11-21 PROCEDURE — 85025 COMPLETE CBC W/AUTO DIFF WBC: CPT

## 2024-11-21 PROCEDURE — 81001 URINALYSIS AUTO W/SCOPE: CPT

## 2024-11-21 PROCEDURE — 80061 LIPID PANEL: CPT

## 2024-11-21 PROCEDURE — 80053 COMPREHEN METABOLIC PANEL: CPT

## 2024-11-21 PROCEDURE — 82043 UR ALBUMIN QUANTITATIVE: CPT

## 2024-11-21 PROCEDURE — 72110 X-RAY EXAM L-2 SPINE 4/>VWS: CPT

## 2024-11-21 PROCEDURE — 82570 ASSAY OF URINE CREATININE: CPT

## 2024-11-21 PROCEDURE — 36415 COLL VENOUS BLD VENIPUNCTURE: CPT

## 2024-11-21 PROCEDURE — 72050 X-RAY EXAM NECK SPINE 4/5VWS: CPT

## 2024-11-21 PROCEDURE — 83036 HEMOGLOBIN GLYCOSYLATED A1C: CPT

## 2024-11-21 NOTE — PROGRESS NOTES
Chief Complaint  Follow-up, Diarrhea, and Constipation    Marlo Barney is a 59 y.o. female who presents to [facility] for follow up.     History of present Illness  Established care 5/17/2024 for nausea, LLQ pain, and constipation. Bowel movement every 3-4 days. Heartburn controlled with pepcid 40mg daily. Recommended Miralax, consider Linzess if no relief.   CT abdomen/pelvis 9/11/2023 for LLQ pain - 8mm nodule in left lower lung lobe  Last EGD/Colonoscopy 10/11/23 - normal EGD. 5mm tubular adenoma polyp in transverse colon otherwise normal.   Gastric emptying study 6/3/24 - normal  Patient call 6/4/24 - persistent constipation, minimal relief with Miralax. Linzess 72 prescribed.     Last office visit 7/26/24 -  taking Linzess 72 daily. She still goes 2-3 days without a bowel movement. She has formed stool followed by diarrhea. Linzess increased to 145. Samples of Ibgard provided.     Patient presents to the office for follow up. Overall Linzess 145 has improved bowel movements. Stools are more formed, can have some intermittent loose stools. Nausea managed with miriam at night.     Past Medical History:   Diagnosis Date    Anxiety     Arthritis     Depression     Diabetes mellitus     Diverticulitis     GERD (gastroesophageal reflux disease)     Headache     Hyperlipidemia     Hypertension        Past Surgical History:   Procedure Laterality Date    COLONOSCOPY      ENDOMETRIAL ABLATION      GASTRIC BYPASS  1986    HERNIA REPAIR      TONSILLECTOMY      UPPER GASTROINTESTINAL ENDOSCOPY           Current Outpatient Medications:     acetaminophen (TYLENOL) 650 MG 8 hr tablet, Tylenol Arthritis Pain 650 mg oral tablet extended release take 2 tablets by oral route every 8 hours   Active, Disp: , Rfl:     amLODIPine (NORVASC) 10 MG tablet, TAKE 1 TABLET BY MOUTH EVERY DAY, Disp: 90 tablet, Rfl: 1    aspirin 81 MG EC tablet, aspirin 81 mg oral tablet,delayed release  (DR/EC) take 1 tablet (81 mg) by oral route once daily   Active, Disp: , Rfl:     atorvastatin (LIPITOR) 10 MG tablet, Take 1 tablet by mouth every night at bedtime., Disp: 90 tablet, Rfl: 1    benazepril (LOTENSIN) 40 MG tablet, TAKE 1 TABLET BY MOUTH EVERY DAY, Disp: 90 tablet, Rfl: 1    busPIRone (BUSPAR) 5 MG tablet, TAKE 1 TABLET BY MOUTH THREE TIMES A DAY, Disp: 270 tablet, Rfl: 1    Calcium Carbonate (Calcium 600) 1500 (600 Ca) MG tablet, , Disp: , Rfl:     carvedilol (COREG) 12.5 MG tablet, Take 1 tablet by mouth 2 (Two) Times a Day With Meals., Disp: 180 tablet, Rfl: 1    Cholecalciferol 50 MCG (2000 UT) capsule, Vitamin D3 50 mcg (2,000 unit) oral capsule take 1 capsule by oral route daily   Active, Disp: , Rfl:     Cobalamin Combinations (B-12) 100-5000 MCG sublingual tablet, , Disp: , Rfl:     etodolac XL (LODINE XL) 400 MG 24 hr tablet, Take 1 tablet by mouth Daily., Disp: 90 tablet, Rfl: 1    famotidine (PEPCID) 40 MG tablet, Take 1 tablet by mouth 2 (Two) Times a Day., Disp: 180 tablet, Rfl: 1    glipizide (Glucotrol) 5 MG tablet, Take 1 tablet by mouth 2 (Two) Times a Day Before Meals., Disp: 180 tablet, Rfl: 1    Iron, Ferrous Sulfate, 325 (65 Fe) MG tablet, , Disp: , Rfl:     lidocaine (LIDODERM) 5 %, PLACE 1 PATCH ON THE SKIN AS DIRECTED BY PROVIDER DAILY. REMOVE & DISCARD PATCH WITHIN 12 HOURS OR AS DIRECTED BY MD, Disp: 90 patch, Rfl: 1    linaclotide (Linzess) 145 MCG capsule capsule, Take 1 capsule by mouth Every Morning Before Breakfast for 90 days., Disp: 90 capsule, Rfl: 1    Multiple Vitamins-Minerals (MULTIVIT/MULTIMINERAL ADULT PO), , Disp: , Rfl:     ondansetron (ZOFRAN) 4 MG tablet, TAKE 1 TABLET BY MOUTH EVERY 8 HOURS AS NEEDED FOR NAUSEA AND VOMITING, Disp: 18 tablet, Rfl: 0    Psyllium 0.36 g capsule, , Disp: , Rfl:     spironolactone (ALDACTONE) 25 MG tablet, TAKE 1 TABLET BY MOUTH DAILY. ON MONDAY,WED, AND FRIDAY, Disp: 36 tablet, Rfl: 4    traMADol (ULTRAM) 50 MG tablet, Take 1  "tablet by mouth Every 8 (Eight) Hours As Needed for Moderate Pain., Disp: 90 tablet, Rfl: 0    docusate sodium (COLACE) 50 MG capsule, Take 1 capsule by mouth. (Patient not taking: Reported on 7/26/2024), Disp: , Rfl:     doxycycline (VIBRAMYCIN) 100 MG capsule, Take 1 capsule by mouth 2 (Two) Times a Day. (Patient not taking: Reported on 7/26/2024), Disp: 20 capsule, Rfl: 0    methylPREDNISolone (MEDROL) 4 MG dose pack, Take as directed on package instructions. (Patient not taking: Reported on 7/26/2024), Disp: 21 tablet, Rfl: 0    ultra-purified peppermint oil (IBGARD) 90 mg capsule capsule, Take 2 capsules by mouth 3 (Three) Times a Day As Needed (3 Times Daily PRN)., Disp: 16 capsule, Rfl: 0     No Known Allergies    Family History   Problem Relation Age of Onset    Diabetes Mother     Hyperlipidemia Mother     Heart disease Mother     Hypertension Mother     Heart disease Father     COPD Father     Diabetes Maternal Aunt     Diabetes Paternal Aunt     Heart disease Maternal Grandmother     Diabetes Maternal Grandmother     Leukemia Maternal Grandfather     Stroke Maternal Grandfather     Thyroid disease Paternal Grandmother     Myelodysplastic syndrome Paternal Grandmother     Cerebral aneurysm Paternal Grandfather     Heart disease Paternal Grandfather     Colon cancer Neg Hx         Social History     Social History Narrative    Not on file       Objective       Vital Signs:   /66 (BP Location: Left arm, Patient Position: Sitting, Cuff Size: Adult)   Pulse 69   Ht 160 cm (62.99\")   Wt 109 kg (241 lb)   SpO2 100%   BMI 42.70 kg/m²       Physical Exam  Constitutional:       Appearance: Normal appearance. She is normal weight.   HENT:      Head: Normocephalic and atraumatic.      Nose: Nose normal.   Pulmonary:      Effort: Pulmonary effort is normal.   Skin:     General: Skin is warm and dry.   Neurological:      Mental Status: She is alert and oriented to person, place, and time. Mental status is at " baseline.   Psychiatric:         Mood and Affect: Mood normal.         Behavior: Behavior normal.         Thought Content: Thought content normal.         Judgment: Judgment normal.         Result Review :       CBC w/diff          2/1/2024    10:09   CBC w/Diff   WBC 6.65    RBC 4.12    Hemoglobin 12.1    Hematocrit 36.4    MCV 88.3    MCH 29.4    MCHC 33.2    RDW 12.4    Platelets 306    Neutrophil Rel % 65.2    Immature Granulocyte Rel % 0.2    Lymphocyte Rel % 26.6    Monocyte Rel % 5.1    Eosinophil Rel % 2.3    Basophil Rel % 0.6      CMP          2/1/2024    10:09   CMP   Glucose 114    BUN 10    Creatinine 0.61    EGFR 105.1    Sodium 139    Potassium 4.4    Chloride 102    Calcium 9.1    Total Protein 6.5    Albumin 4.3    Globulin 2.2    Total Bilirubin 0.4    Alkaline Phosphatase 39    AST (SGOT) 9    ALT (SGPT) 8    Albumin/Globulin Ratio 2.0    BUN/Creatinine Ratio 16.4    Anion Gap 12.0                Assessment and Plan    Diagnoses and all orders for this visit:    1. Irritable bowel syndrome, unspecified type (Primary)    2. History of colon polyps    Continue Linzess 145    Follow Up   Return in about 6 months (around 5/21/2025).  Patient was given instructions and counseling regarding her condition or for health maintenance advice. Please see specific information pulled into the AVS if appropriate.

## 2024-11-23 LAB — BACTERIA SPEC AEROBE CULT: NO GROWTH

## 2024-12-11 ENCOUNTER — OFFICE VISIT (OUTPATIENT)
Dept: FAMILY MEDICINE CLINIC | Facility: CLINIC | Age: 59
End: 2024-12-11
Payer: COMMERCIAL

## 2024-12-11 VITALS
BODY MASS INDEX: 43.62 KG/M2 | RESPIRATION RATE: 20 BRPM | OXYGEN SATURATION: 99 % | HEART RATE: 79 BPM | WEIGHT: 246.2 LBS | SYSTOLIC BLOOD PRESSURE: 102 MMHG | TEMPERATURE: 96.8 F | HEIGHT: 63 IN | DIASTOLIC BLOOD PRESSURE: 70 MMHG

## 2024-12-11 DIAGNOSIS — E11.65 TYPE 2 DIABETES MELLITUS WITH HYPERGLYCEMIA, WITHOUT LONG-TERM CURRENT USE OF INSULIN: ICD-10-CM

## 2024-12-11 DIAGNOSIS — K59.1 FUNCTIONAL DIARRHEA: ICD-10-CM

## 2024-12-11 DIAGNOSIS — Z51.81 MEDICATION MONITORING ENCOUNTER: ICD-10-CM

## 2024-12-11 DIAGNOSIS — F41.9 ANXIETY: ICD-10-CM

## 2024-12-11 DIAGNOSIS — I10 ESSENTIAL HYPERTENSION: ICD-10-CM

## 2024-12-11 DIAGNOSIS — G89.29 CHRONIC BACK PAIN, UNSPECIFIED BACK LOCATION, UNSPECIFIED BACK PAIN LATERALITY: ICD-10-CM

## 2024-12-11 DIAGNOSIS — Z23 NEED FOR INFLUENZA VACCINATION: Primary | ICD-10-CM

## 2024-12-11 DIAGNOSIS — M54.9 CHRONIC BACK PAIN, UNSPECIFIED BACK LOCATION, UNSPECIFIED BACK PAIN LATERALITY: ICD-10-CM

## 2024-12-11 DIAGNOSIS — R05.1 ACUTE COUGH: ICD-10-CM

## 2024-12-11 DIAGNOSIS — E78.2 MIXED HYPERLIPIDEMIA: ICD-10-CM

## 2024-12-11 DIAGNOSIS — K91.1 DUMPING SYNDROME: ICD-10-CM

## 2024-12-11 DIAGNOSIS — K21.9 GASTROESOPHAGEAL REFLUX DISEASE WITHOUT ESOPHAGITIS: ICD-10-CM

## 2024-12-11 DIAGNOSIS — G47.00 INSOMNIA, UNSPECIFIED TYPE: ICD-10-CM

## 2024-12-11 DIAGNOSIS — H61.23 BILATERAL IMPACTED CERUMEN: ICD-10-CM

## 2024-12-11 LAB
AMPHET+METHAMPHET UR QL: NEGATIVE
AMPHETAMINE INTERNAL CONTROL: NORMAL
AMPHETAMINES UR QL: NEGATIVE
BARBITURATE INTERNAL CONTROL: NORMAL
BARBITURATES UR QL SCN: NEGATIVE
BENZODIAZ UR QL SCN: NEGATIVE
BENZODIAZEPINE INTERNAL CONTROL: NORMAL
BUPRENORPHINE INTERNAL CONTROL: NORMAL
BUPRENORPHINE SERPL-MCNC: NEGATIVE NG/ML
CANNABINOIDS SERPL QL: NEGATIVE
COCAINE INTERNAL CONTROL: NORMAL
COCAINE UR QL: NEGATIVE
EXPIRATION DATE: NORMAL
Lab: NORMAL
MDMA (ECSTASY) INTERNAL CONTROL: NORMAL
MDMA UR QL SCN: NEGATIVE
METHADONE INTERNAL CONTROL: NORMAL
METHADONE UR QL SCN: NEGATIVE
METHAMPHETAMINE INTERNAL CONTROL: NORMAL
MORPHINE INTERNAL CONTROL: NORMAL
MORPHINE/OPIATES SCREEN, URINE: NEGATIVE
OXYCODONE INTERNAL CONTROL: NORMAL
OXYCODONE UR QL SCN: NEGATIVE
PCP UR QL SCN: NEGATIVE
PHENCYCLIDINE INTERNAL CONTROL: NORMAL
THC INTERNAL CONTROL: NORMAL

## 2024-12-11 PROCEDURE — 90656 IIV3 VACC NO PRSV 0.5 ML IM: CPT | Performed by: NURSE PRACTITIONER

## 2024-12-11 PROCEDURE — 69209 REMOVE IMPACTED EAR WAX UNI: CPT | Performed by: NURSE PRACTITIONER

## 2024-12-11 PROCEDURE — 90471 IMMUNIZATION ADMIN: CPT | Performed by: NURSE PRACTITIONER

## 2024-12-11 PROCEDURE — 99214 OFFICE O/P EST MOD 30 MIN: CPT | Performed by: NURSE PRACTITIONER

## 2024-12-11 RX ORDER — TRAMADOL HYDROCHLORIDE 50 MG/1
50 TABLET ORAL EVERY 8 HOURS PRN
Qty: 270 TABLET | Refills: 0 | Status: SHIPPED | OUTPATIENT
Start: 2024-12-11

## 2024-12-11 RX ORDER — SPIRONOLACTONE 25 MG/1
25 TABLET ORAL DAILY
Qty: 90 TABLET | Refills: 1 | Status: SHIPPED | OUTPATIENT
Start: 2024-12-11

## 2024-12-11 RX ORDER — TRAZODONE HYDROCHLORIDE 50 MG/1
50 TABLET, FILM COATED ORAL NIGHTLY
Qty: 30 TABLET | Refills: 0 | Status: SHIPPED | OUTPATIENT
Start: 2024-12-11

## 2024-12-11 RX ORDER — BLOOD-GLUCOSE METER
1 KIT MISCELLANEOUS AS NEEDED
Qty: 1 EACH | Refills: 0 | Status: SHIPPED | OUTPATIENT
Start: 2024-12-11

## 2024-12-11 RX ORDER — BUSPIRONE HYDROCHLORIDE 10 MG/1
10 TABLET ORAL 3 TIMES DAILY
Qty: 270 TABLET | Refills: 1 | Status: SHIPPED | OUTPATIENT
Start: 2024-12-11 | End: 2024-12-11

## 2024-12-11 RX ORDER — LINACLOTIDE 145 UG/1
145 CAPSULE, GELATIN COATED ORAL
COMMUNITY
Start: 2024-11-07

## 2024-12-11 RX ORDER — FAMOTIDINE 40 MG/1
40 TABLET, FILM COATED ORAL 2 TIMES DAILY
Qty: 180 TABLET | Refills: 1 | Status: SHIPPED | OUTPATIENT
Start: 2024-12-11

## 2024-12-11 RX ORDER — BUSPIRONE HYDROCHLORIDE 10 MG/1
10 TABLET ORAL 3 TIMES DAILY
Qty: 270 TABLET | Refills: 1 | Status: SHIPPED | OUTPATIENT
Start: 2024-12-11

## 2024-12-11 NOTE — PROGRESS NOTES
Chief Complaint  Headache (Since thanksgiving ), Sinusitis, Diabetes (Lost meter, sugars have been high), Neck Pain (Discuss x-rays and pain management), Back Pain (Discuss x-rays and pain management), frequent bowel movements, Shoulder Pain, Hip Pain, and Depression (Taking buspar but is angry, frustrated, )    Subjective        Marlo Barney presents to [facility]  History of Present Illness  Sinus congestion:  is better after starting flonase    Headache:  having since Thanksgiving.  Started with sore throat and cough and has had headache since  and flonase started and has helped.    Diabetes:  lost meter so hasn't been checking.  Sugars has been higher she thinks.      Neck/lumbar pain states has a high pain tolerance so tramadol helps but not all the time.  States that can't get comfortable.  CBD gummy helped.  States would go to pain management but states insurance is not good so doesn't have the finances to go.  And concerned about other pain medications  causing more stomach and bowel issues. With what she is currently battling with dumping syndrome.    Frequent bowel movements with urgency to go multiple times a day.  Has been under evaluation for.  Dumping syndrome diarrhea is better but still going as often.    Depression on Buspar but not covering during the day.        6 mo follow up  Symptoms are: recurrent.   Onset was in the past 7 days.   Symptoms occur: daily.  Symptoms include: abdominal pain, anorexia, joint pain, change in stool, nasal congestion, fatigue, headaches, myalgias, nausea, neck pain and sore throat.   Pertinent negative symptoms include no chest pain, no cough, no fever, no numbness and no weakness.   Treatment and/or Medications comments include: Same       The following portions of the patient's history were personally reviewed and updated as appropriate: allergies, current medications, past medical history, past surgical history, past  family history, and past social history.     Body mass index is 43.62 kg/m².           Past History:    Medical History: has a past medical history of Anxiety, Arthritis, Asthma (2006), Cholelithiasis (1995), Depression, Diabetes mellitus, Diverticulitis, Diverticulosis (2002), GERD (gastroesophageal reflux disease), Headache, HL (hearing loss) (2014), Hyperlipidemia, Hypertension, Irritable bowel syndrome, Low back pain, and Obesity (All my life).     Surgical History: has a past surgical history that includes Tonsillectomy; Gastric bypass (1986); Endometrial ablation; Hernia repair; Colonoscopy; Upper gastrointestinal endoscopy; Bariatric Surgery (1986); and Cholecystectomy (1996).     Family History: family history includes COPD in her father; Cerebral aneurysm in her paternal grandfather; Diabetes in her maternal aunt, maternal grandmother, mother, and paternal aunt; Hearing loss in her mother; Heart disease in her father, maternal grandmother, mother, and paternal grandfather; Hyperlipidemia in her mother; Hypertension in her mother; Kidney disease in her maternal aunt; Leukemia in her maternal grandfather; Myelodysplastic syndrome in her paternal grandmother; Stroke in her maternal grandfather and paternal grandfather; Thyroid disease in her paternal grandmother.     Social History: reports that she quit smoking about 37 years ago. Her smoking use included cigarettes. She started smoking about 39 years ago. She has a 0.5 pack-year smoking history. She has been exposed to tobacco smoke. She has never used smokeless tobacco. She reports current alcohol use. She reports that she does not use drugs.    Allergies: Patient has no known allergies.          Current Outpatient Medications:     acetaminophen (TYLENOL) 650 MG 8 hr tablet, Tylenol Arthritis Pain 650 mg oral tablet extended release take 2 tablets by oral route every 8 hours   Active, Disp: , Rfl:     amLODIPine (NORVASC) 10 MG tablet, TAKE 1 TABLET BY  MOUTH EVERY DAY, Disp: 90 tablet, Rfl: 1    aspirin 81 MG EC tablet, aspirin 81 mg oral tablet,delayed release (DR/EC) take 1 tablet (81 mg) by oral route once daily   Active, Disp: , Rfl:     atorvastatin (LIPITOR) 10 MG tablet, Take 1 tablet by mouth every night at bedtime., Disp: 90 tablet, Rfl: 1    benazepril (LOTENSIN) 40 MG tablet, TAKE 1 TABLET BY MOUTH EVERY DAY, Disp: 90 tablet, Rfl: 1    busPIRone (BUSPAR) 10 MG tablet, Take 1 tablet by mouth 3 (Three) Times a Day., Disp: 270 tablet, Rfl: 1    Calcium Carbonate (Calcium 600) 1500 (600 Ca) MG tablet, , Disp: , Rfl:     carvedilol (COREG) 12.5 MG tablet, Take 1 tablet by mouth 2 (Two) Times a Day With Meals., Disp: 180 tablet, Rfl: 1    CBD oil (cannabidiol) capsule, Take  by mouth., Disp: , Rfl:     Cholecalciferol 50 MCG (2000 UT) capsule, Vitamin D3 50 mcg (2,000 unit) oral capsule take 1 capsule by oral route daily   Active, Disp: , Rfl:     Cobalamin Combinations (B-12) 100-5000 MCG sublingual tablet, , Disp: , Rfl:     etodolac XL (LODINE XL) 400 MG 24 hr tablet, Take 1 tablet by mouth Daily., Disp: 90 tablet, Rfl: 1    famotidine (PEPCID) 40 MG tablet, Take 1 tablet by mouth 2 (Two) Times a Day., Disp: 180 tablet, Rfl: 1    glipizide (Glucotrol) 5 MG tablet, Take 1 tablet by mouth 2 (Two) Times a Day Before Meals., Disp: 180 tablet, Rfl: 1    Iron, Ferrous Sulfate, 325 (65 Fe) MG tablet, , Disp: , Rfl:     lidocaine (LIDODERM) 5 %, PLACE 1 PATCH ON THE SKIN AS DIRECTED BY PROVIDER DAILY. REMOVE & DISCARD PATCH WITHIN 12 HOURS OR AS DIRECTED BY MD, Disp: 90 patch, Rfl: 1    Linzess 145 MCG capsule capsule, Take 1 capsule by mouth Before Breakfast., Disp: , Rfl:     Multiple Vitamins-Minerals (MULTIVIT/MULTIMINERAL ADULT PO), , Disp: , Rfl:     ondansetron (ZOFRAN) 4 MG tablet, TAKE 1 TABLET BY MOUTH EVERY 8 HOURS AS NEEDED FOR NAUSEA AND VOMITING, Disp: 18 tablet, Rfl: 0    Psyllium 0.36 g capsule, , Disp: , Rfl:     spironolactone (ALDACTONE) 25 MG  "tablet, Take 1 tablet by mouth Daily., Disp: 90 tablet, Rfl: 1    traMADol (ULTRAM) 50 MG tablet, Take 1 tablet by mouth Every 8 (Eight) Hours As Needed for Moderate Pain., Disp: 270 tablet, Rfl: 0    glucose monitor monitoring kit, Use 1 each As Needed (elevated sugar)., Disp: 1 each, Rfl: 0    Semaglutide,0.25 or 0.5MG/DOS, (OZEMPIC) 2 MG/3ML solution pen-injector, Inject 0.25 mg under the skin into the appropriate area as directed 1 (One) Time Per Week., Disp: 3 mL, Rfl: 0    traZODone (DESYREL) 50 MG tablet, Take 1 tablet by mouth Every Night., Disp: 30 tablet, Rfl: 0    Medications Discontinued During This Encounter   Medication Reason    docusate sodium (COLACE) 50 MG capsule *Therapy completed    methylPREDNISolone (MEDROL) 4 MG dose pack *Therapy completed    doxycycline (VIBRAMYCIN) 100 MG capsule *Therapy completed    ultra-purified peppermint oil (IBGARD) 90 mg capsule capsule *Therapy completed    busPIRone (BUSPAR) 5 MG tablet Reorder    busPIRone (BUSPAR) 10 MG tablet     spironolactone (ALDACTONE) 25 MG tablet Reorder    famotidine (PEPCID) 40 MG tablet Reorder    traMADol (ULTRAM) 50 MG tablet Reorder         Review of Systems   Constitutional:  Positive for fatigue. Negative for fever.   HENT:  Positive for congestion and sore throat.    Respiratory:  Negative for cough and shortness of breath.    Cardiovascular:  Negative for chest pain, palpitations and leg swelling.   Gastrointestinal:  Positive for abdominal pain, anorexia and nausea.   Musculoskeletal:  Positive for back pain, joint pain, myalgias and neck pain.   Neurological:  Negative for dizziness, weakness, numbness and headache.        Objective         Vitals:    12/11/24 1032   BP: 102/70   BP Location: Right arm   Patient Position: Sitting   Cuff Size: Large Adult   Pulse: 79   Resp: 20   Temp: 96.8 °F (36 °C)   TempSrc: Temporal   SpO2: 99%   Weight: 112 kg (246 lb 3.2 oz)   Height: 160 cm (62.99\")     Body mass index is 43.62 kg/m². "         Physical Exam  Vitals reviewed.   Constitutional:       Appearance: Normal appearance. She is well-developed.   HENT:      Head: Normocephalic and atraumatic.      Mouth/Throat:      Pharynx: No oropharyngeal exudate.   Eyes:      Conjunctiva/sclera: Conjunctivae normal.      Pupils: Pupils are equal, round, and reactive to light.   Cardiovascular:      Rate and Rhythm: Normal rate and regular rhythm.      Heart sounds: Normal heart sounds. No murmur heard.     No friction rub. No gallop.   Pulmonary:      Effort: Pulmonary effort is normal.      Breath sounds: Normal breath sounds. No wheezing or rhonchi.   Skin:     General: Skin is warm and dry.   Neurological:      Mental Status: She is alert and oriented to person, place, and time.   Psychiatric:         Mood and Affect: Mood and affect normal.         Behavior: Behavior normal.         Thought Content: Thought content normal.         Judgment: Judgment normal.             Result Review :      Ear Cerumen Removal    Date/Time: 12/11/2024 11:20 AM    Performed by: Tung Arias APRN  Authorized by: Tung Arias APRN    Anesthesia:  Local Anesthetic: none  Location details: left ear and right ear  Patient tolerance: patient tolerated the procedure well with no immediate complications  Procedure type: irrigation   Sedation:  Patient sedated: no                Assessment and Plan     Diagnoses and all orders for this visit:    1. Need for influenza vaccination (Primary)  -     Fluzone >6mos ([id number])    2. Medication monitoring encounter  -     POC Medline 12 Panel Urine Drug Screen    3. Type 2 diabetes mellitus with hyperglycemia, without long-term current use of insulin  -     Semaglutide,0.25 or 0.5MG/DOS, (OZEMPIC) 2 MG/3ML solution pen-injector; Inject 0.25 mg under the skin into the appropriate area as directed 1 (One) Time Per Week.  Dispense: 3 mL; Refill: 0  -     glucose monitor monitoring kit; Use 1 each As Needed (elevated sugar).   Dispense: 1 each; Refill: 0  -     CBC & Differential; Future  -     Comprehensive Metabolic Panel; Future  -     Hemoglobin A1c; Future  -     Urinalysis With Culture If Indicated -; Future  -     Lipid Panel; Future  -     Microalbumin / Creatinine Urine Ratio - Urine, Clean Catch; Future  -     Hemoglobin A1c; Future    4. Essential hypertension  -     spironolactone (ALDACTONE) 25 MG tablet; Take 1 tablet by mouth Daily.  Dispense: 90 tablet; Refill: 1    5. Mixed hyperlipidemia    6. Gastroesophageal reflux disease without esophagitis  -     famotidine (PEPCID) 40 MG tablet; Take 1 tablet by mouth 2 (Two) Times a Day.  Dispense: 180 tablet; Refill: 1    7. Dumping syndrome    8. Functional diarrhea    9. Acute cough  -     XR Chest 2 View; Future    10. Anxiety  -     Discontinue: busPIRone (BUSPAR) 10 MG tablet; Take 1 tablet by mouth 3 (Three) Times a Day.  Dispense: 270 tablet; Refill: 1  -     busPIRone (BUSPAR) 10 MG tablet; Take 1 tablet by mouth 3 (Three) Times a Day.  Dispense: 270 tablet; Refill: 1    11. Chronic back pain, unspecified back location, unspecified back pain laterality  -     traMADol (ULTRAM) 50 MG tablet; Take 1 tablet by mouth Every 8 (Eight) Hours As Needed for Moderate Pain.  Dispense: 270 tablet; Refill: 0    12. Insomnia, unspecified type  -     traZODone (DESYREL) 50 MG tablet; Take 1 tablet by mouth Every Night.  Dispense: 30 tablet; Refill: 0    13. Bilateral impacted cerumen  -     Ear Cerumen Removal        Ozempic 1 mg dosage.      Follow Up     Return in about 6 months (around 6/11/2025).    Patient was given instructions and counseling regarding her condition or for health maintenance advice. Please see specific information pulled into the AVS if appropriate.         Answers submitted by the patient for this visit:  Primary Reason for Visit (Submitted on 12/5/2024)  What is the primary reason for your visit?: Problem Not Listed

## 2024-12-20 ENCOUNTER — CLINICAL SUPPORT (OUTPATIENT)
Dept: FAMILY MEDICINE CLINIC | Facility: CLINIC | Age: 59
End: 2024-12-20
Payer: COMMERCIAL

## 2024-12-20 DIAGNOSIS — H61.21 IMPACTED CERUMEN OF RIGHT EAR: Primary | ICD-10-CM

## 2024-12-26 ENCOUNTER — HOSPITAL ENCOUNTER (OUTPATIENT)
Dept: GENERAL RADIOLOGY | Facility: HOSPITAL | Age: 59
Discharge: HOME | End: 2024-12-26
Admitting: NURSE PRACTITIONER
Payer: COMMERCIAL

## 2024-12-26 DIAGNOSIS — R05.1 ACUTE COUGH: ICD-10-CM

## 2024-12-26 PROCEDURE — 71046 X-RAY EXAM CHEST 2 VIEWS: CPT

## 2024-12-31 ENCOUNTER — PATIENT MESSAGE (OUTPATIENT)
Dept: FAMILY MEDICINE CLINIC | Facility: CLINIC | Age: 59
End: 2024-12-31
Payer: COMMERCIAL

## 2025-01-14 DIAGNOSIS — I10 ESSENTIAL HYPERTENSION: ICD-10-CM

## 2025-01-14 DIAGNOSIS — E11.65 TYPE 2 DIABETES MELLITUS WITH HYPERGLYCEMIA, WITHOUT LONG-TERM CURRENT USE OF INSULIN: ICD-10-CM

## 2025-01-14 RX ORDER — BENAZEPRIL HYDROCHLORIDE 40 MG/1
TABLET ORAL
Qty: 90 TABLET | Refills: 1 | Status: SHIPPED | OUTPATIENT
Start: 2025-01-14

## 2025-01-14 RX ORDER — AMLODIPINE BESYLATE 10 MG/1
TABLET ORAL
Qty: 90 TABLET | Refills: 1 | Status: SHIPPED | OUTPATIENT
Start: 2025-01-14

## 2025-01-14 RX ORDER — SEMAGLUTIDE 0.68 MG/ML
INJECTION, SOLUTION SUBCUTANEOUS
Qty: 3 ML | Refills: 1 | Status: SHIPPED | OUTPATIENT
Start: 2025-01-14

## 2025-01-28 RX ORDER — LINACLOTIDE 145 UG/1
145 CAPSULE, GELATIN COATED ORAL
Qty: 90 CAPSULE | Refills: 1 | Status: SHIPPED | OUTPATIENT
Start: 2025-01-28

## 2025-02-04 DIAGNOSIS — I10 ESSENTIAL HYPERTENSION: ICD-10-CM

## 2025-02-04 DIAGNOSIS — M54.2 CERVICALGIA: ICD-10-CM

## 2025-02-04 DIAGNOSIS — M54.50 CHRONIC LOW BACK PAIN, UNSPECIFIED BACK PAIN LATERALITY, UNSPECIFIED WHETHER SCIATICA PRESENT: ICD-10-CM

## 2025-02-04 DIAGNOSIS — E78.2 MIXED HYPERLIPIDEMIA: ICD-10-CM

## 2025-02-04 DIAGNOSIS — G89.29 CHRONIC LOW BACK PAIN, UNSPECIFIED BACK PAIN LATERALITY, UNSPECIFIED WHETHER SCIATICA PRESENT: ICD-10-CM

## 2025-02-04 RX ORDER — ETODOLAC 400 MG/1
400 TABLET, EXTENDED RELEASE ORAL DAILY
Qty: 90 TABLET | Refills: 1 | Status: SHIPPED | OUTPATIENT
Start: 2025-02-04

## 2025-02-04 RX ORDER — CARVEDILOL 12.5 MG/1
12.5 TABLET ORAL 2 TIMES DAILY WITH MEALS
Qty: 180 TABLET | Refills: 1 | Status: SHIPPED | OUTPATIENT
Start: 2025-02-04

## 2025-02-04 RX ORDER — ATORVASTATIN CALCIUM 10 MG/1
10 TABLET, FILM COATED ORAL NIGHTLY
Qty: 90 TABLET | Refills: 1 | Status: SHIPPED | OUTPATIENT
Start: 2025-02-04

## 2025-02-24 ENCOUNTER — OFFICE VISIT (OUTPATIENT)
Dept: FAMILY MEDICINE CLINIC | Facility: CLINIC | Age: 60
End: 2025-02-24
Payer: COMMERCIAL

## 2025-02-24 VITALS
TEMPERATURE: 96.6 F | WEIGHT: 239.8 LBS | DIASTOLIC BLOOD PRESSURE: 70 MMHG | BODY MASS INDEX: 42.49 KG/M2 | HEART RATE: 79 BPM | OXYGEN SATURATION: 98 % | HEIGHT: 63 IN | RESPIRATION RATE: 20 BRPM | SYSTOLIC BLOOD PRESSURE: 104 MMHG

## 2025-02-24 DIAGNOSIS — M54.42 CHRONIC BILATERAL LOW BACK PAIN WITH LEFT-SIDED SCIATICA: ICD-10-CM

## 2025-02-24 DIAGNOSIS — Z12.31 ENCOUNTER FOR SCREENING MAMMOGRAM FOR MALIGNANT NEOPLASM OF BREAST: Primary | ICD-10-CM

## 2025-02-24 DIAGNOSIS — Z78.0 POST-MENOPAUSE: ICD-10-CM

## 2025-02-24 DIAGNOSIS — Z23 NEED FOR VACCINATION: ICD-10-CM

## 2025-02-24 DIAGNOSIS — G89.29 CHRONIC BILATERAL LOW BACK PAIN WITH LEFT-SIDED SCIATICA: ICD-10-CM

## 2025-02-24 DIAGNOSIS — M54.2 CERVICALGIA: ICD-10-CM

## 2025-02-24 PROCEDURE — 99214 OFFICE O/P EST MOD 30 MIN: CPT | Performed by: NURSE PRACTITIONER

## 2025-02-24 RX ORDER — SENNOSIDES 8.6 MG
1300 CAPSULE ORAL 2 TIMES DAILY
COMMUNITY

## 2025-02-24 RX ORDER — LANCETS
EACH MISCELLANEOUS
COMMUNITY
Start: 2024-12-12

## 2025-02-24 RX ORDER — BLOOD-GLUCOSE METER
EACH MISCELLANEOUS
COMMUNITY
Start: 2024-12-11

## 2025-02-24 RX ORDER — BLOOD SUGAR DIAGNOSTIC
STRIP MISCELLANEOUS
COMMUNITY
Start: 2024-12-12

## 2025-02-24 NOTE — PROGRESS NOTES
Answers submitted by the patient for this visit:  Problem not listed (Submitted on 2/18/2025)  Chief Complaint: Other medical problem  Reason for appointment: Filling out return to work paperwork to extend medical leave  abdominal pain: Yes  anorexia: Yes  joint pain: Yes  change in stool: Yes  fatigue: Yes  nausea: Yes  neck pain: Yes  vertigo: Yes  Other symptom: See above  Onset: 1 to 5 years  Chronicity: recurrent  Frequency: weekly  Chief Complaint  Dizziness (Frequent - started after taking the Ozempic - sugars and blood pressures have been good, worse when she bends over), paperwork, Sciatica, Back Pain, and joint pain    Subjective        Marlo Barney presents to [facility]  History of Present Illness  Dizziness:  has been frequent started after starting Ozempic.  Will be more with bending over.     Gastro hasn't gotten a determination on   is on Linzess and has slowed down diarrhea but going on often.  States that will have diarrhea and constipation.    Having injection for arthritis in wrists hips.  Has been getting shots and is due but can't afford to get.    Low back pain  chronic.  With left sided sciatica that makes it so she can't walk if not taking tramadol and tylenol.  Helps her move. Sits on a ring pillow now and has hemorrhoids due to diarrhea.  Tailbone hurts all the time.    Filling out return to work paperwork to extend medical leave  Symptoms are: recurrent.   Onset was 1 to 5 years.   Symptoms occur: weekly.  Symptoms include: abdominal pain, anorexia, joint pain, change in stool, fatigue, nausea, neck pain and vertigo.   Pertinent negative symptoms include no chest pain, no cough, no fever, no numbness and no weakness.   Other symptom:  See above      The following portions of the patient's history were personally reviewed and updated as appropriate: allergies, current medications, past medical history, past surgical history, past family history,  and past social history.     Body mass index is 42.49 kg/m².           Past History:    Medical History: has a past medical history of Anxiety, Arthritis, Asthma (2006), Cholelithiasis (1995), Depression, Diabetes mellitus, Diverticulitis, Diverticulosis (2002), GERD (gastroesophageal reflux disease), Headache, HL (hearing loss) (2014), Hyperlipidemia, Hypertension, Inflammatory bowel disease, Irritable bowel syndrome, Low back pain, and Obesity (All my life).     Surgical History: has a past surgical history that includes Tonsillectomy; Gastric bypass (1986); Endometrial ablation; Hernia repair; Colonoscopy; Upper gastrointestinal endoscopy; Bariatric Surgery (1986); and Cholecystectomy (1996).     Family History: family history includes Asthma in her mother; COPD in her father; Cerebral aneurysm in her paternal grandfather; Diabetes in her maternal aunt, maternal grandmother, mother, and paternal aunt; Hearing loss in her mother; Heart disease in her father, maternal grandmother, mother, and paternal grandfather; Hyperlipidemia in her mother; Hypertension in her mother; Kidney disease in her maternal aunt; Leukemia in her maternal grandfather; Myelodysplastic syndrome in her paternal grandmother; Stroke in her maternal grandfather and paternal grandfather; Thyroid disease in her paternal grandmother.     Social History: reports that she quit smoking about 37 years ago. Her smoking use included cigarettes. She started smoking about 39 years ago. She has a 0.5 pack-year smoking history. She has been exposed to tobacco smoke. She has never used smokeless tobacco. She reports current alcohol use. She reports that she does not use drugs.    Allergies: Patient has no known allergies.          Current Outpatient Medications:     Accu-Chek Guide Test test strip, use to test AS DIRECTED for elevated sugar, Disp: , Rfl:     Accu-Chek Softclix Lancets lancets, use to test elevated sugar AS NEEDED, Disp: , Rfl:     amLODIPine  (NORVASC) 10 MG tablet, TAKE 1 TABLET BY MOUTH EVERY DAY, Disp: 90 tablet, Rfl: 1    aspirin 81 MG EC tablet, aspirin 81 mg oral tablet,delayed release (DR/EC) take 1 tablet (81 mg) by oral route once daily   Active, Disp: , Rfl:     atorvastatin (LIPITOR) 10 MG tablet, TAKE 1 TABLET NIGHTLY AT   BEDTIME, Disp: 90 tablet, Rfl: 1    benazepril (LOTENSIN) 40 MG tablet, TAKE 1 TABLET BY MOUTH EVERY DAY, Disp: 90 tablet, Rfl: 1    Blood Glucose Monitoring Suppl (Accu-Chek Guide Me) w/Device kit, USE AS DIRECTED AS NEEDED for elevated sugar, Disp: , Rfl:     busPIRone (BUSPAR) 10 MG tablet, Take 1 tablet by mouth 3 (Three) Times a Day., Disp: 270 tablet, Rfl: 1    Calcium Carbonate (Calcium 600) 1500 (600 Ca) MG tablet, , Disp: , Rfl:     carvedilol (COREG) 12.5 MG tablet, TAKE 1 TABLET TWICE DAILY  WITH MEALS, Disp: 180 tablet, Rfl: 1    CBD oil (cannabidiol) capsule, Take  by mouth., Disp: , Rfl:     Cholecalciferol 50 MCG (2000 UT) capsule, Vitamin D3 50 mcg (2,000 unit) oral capsule take 1 capsule by oral route daily   Active, Disp: , Rfl:     Cobalamin Combinations (B-12) 100-5000 MCG sublingual tablet, , Disp: , Rfl:     etodolac XL (LODINE XL) 400 MG 24 hr tablet, TAKE 1 TABLET DAILY, Disp: 90 tablet, Rfl: 1    famotidine (PEPCID) 40 MG tablet, Take 1 tablet by mouth 2 (Two) Times a Day., Disp: 180 tablet, Rfl: 1    glucose monitor monitoring kit, Use 1 each As Needed (elevated sugar)., Disp: 1 each, Rfl: 0    Iron, Ferrous Sulfate, 325 (65 Fe) MG tablet, , Disp: , Rfl:     lidocaine (LIDODERM) 5 %, PLACE 1 PATCH ON THE SKIN AS DIRECTED BY PROVIDER DAILY. REMOVE & DISCARD PATCH WITHIN 12 HOURS OR AS DIRECTED BY MD (Patient taking differently: Place 1 patch on the skin as directed by provider Daily As Needed for Severe Pain. Remove & Discard patch within 12 hours or as directed by MD), Disp: 90 patch, Rfl: 1    Linzess 145 MCG capsule capsule, TAKE 1 CAPSULE EVERY       MORNING BEFORE BREAKFAST, Disp: 90 capsule,  "Rfl: 1    Multiple Vitamins-Minerals (MULTIVIT/MULTIMINERAL ADULT PO), , Disp: , Rfl:     Ozempic, 0.25 or 0.5 MG/DOSE, 2 MG/3ML solution pen-injector, INJECT 0.25mg SUBCUTANEOUSLY ONCE WEEKLY **56 day supply**, Disp: 3 mL, Rfl: 1    Psyllium 0.36 g capsule, , Disp: , Rfl:     spironolactone (ALDACTONE) 25 MG tablet, Take 1 tablet by mouth Daily., Disp: 90 tablet, Rfl: 1    traMADol (ULTRAM) 50 MG tablet, Take 1 tablet by mouth Every 8 (Eight) Hours As Needed for Moderate Pain., Disp: 270 tablet, Rfl: 0    acetaminophen (TYLENOL) 650 MG 8 hr tablet, Take 2 tablets by mouth 2 (Two) Times a Day., Disp: , Rfl:     Medications Discontinued During This Encounter   Medication Reason    acetaminophen (TYLENOL) 650 MG 8 hr tablet *Therapy completed    ondansetron (ZOFRAN) 4 MG tablet *Therapy completed    glipizide (Glucotrol) 5 MG tablet *Therapy completed    traZODone (DESYREL) 50 MG tablet *Therapy completed         Review of Systems   Constitutional:  Positive for fatigue. Negative for fever.   Respiratory:  Negative for cough and shortness of breath.    Cardiovascular:  Negative for chest pain, palpitations and leg swelling.   Gastrointestinal:  Positive for abdominal pain, anorexia and nausea.   Musculoskeletal:  Positive for back pain, joint pain and neck pain.   Neurological:  Positive for dizziness and vertigo. Negative for weakness, numbness and headache.        Objective         Vitals:    02/24/25 1308   BP: 104/70   Pulse: 79   Resp: 20   Temp: 96.6 °F (35.9 °C)   TempSrc: Temporal   SpO2: 98%   Weight: 109 kg (239 lb 12.8 oz)   Height: 160 cm (62.99\")     Body mass index is 42.49 kg/m².         Physical Exam  Vitals reviewed.   Constitutional:       Appearance: Normal appearance. She is well-developed.   HENT:      Head: Normocephalic and atraumatic.      Mouth/Throat:      Pharynx: No oropharyngeal exudate.   Eyes:      Conjunctiva/sclera: Conjunctivae normal.      Pupils: Pupils are equal, round, and " reactive to light.   Cardiovascular:      Rate and Rhythm: Normal rate and regular rhythm.      Heart sounds: Normal heart sounds. No murmur heard.     No friction rub. No gallop.   Pulmonary:      Effort: Pulmonary effort is normal.      Breath sounds: Normal breath sounds. No wheezing or rhonchi.   Skin:     General: Skin is warm and dry.   Neurological:      Mental Status: She is alert and oriented to person, place, and time.   Psychiatric:         Mood and Affect: Mood and affect normal.         Behavior: Behavior normal.         Thought Content: Thought content normal.         Judgment: Judgment normal.             Result Review :               Assessment and Plan     Diagnoses and all orders for this visit:    1. Encounter for screening mammogram for malignant neoplasm of breast (Primary)  -     Mammo Screening Digital Tomosynthesis Bilateral With CAD; Future    2. Need for vaccination    3. Post-menopause  -     DEXA Bone Density Axial; Future    4. Cervicalgia  -     MRI Cervical Spine Without Contrast; Future  -     Ambulatory Referral to Neurosurgery    5. Chronic bilateral low back pain with left-sided sciatica  -     MRI Lumbar Spine Without Contrast; Future  -     Ambulatory Referral to Neurosurgery    Will need to get MRI and follow up with neurosurgeon to determine if needs disability for this due to bulging discs and sciatica.    Cut amlodipine in half and continue to journal blood pressures.      Follow Up     Return in about 6 months (around 8/24/2025).    30 minutes spent in discussion , evaluation and treatment in the room.    Patient was given instructions and counseling regarding her condition or for health maintenance advice. Please see specific information pulled into the AVS if appropriate.

## 2025-03-29 DIAGNOSIS — M54.9 CHRONIC BACK PAIN, UNSPECIFIED BACK LOCATION, UNSPECIFIED BACK PAIN LATERALITY: ICD-10-CM

## 2025-03-29 DIAGNOSIS — G89.29 CHRONIC BACK PAIN, UNSPECIFIED BACK LOCATION, UNSPECIFIED BACK PAIN LATERALITY: ICD-10-CM

## 2025-03-31 RX ORDER — TRAMADOL HYDROCHLORIDE 50 MG/1
50 TABLET ORAL EVERY 8 HOURS PRN
Qty: 270 TABLET | Refills: 0 | Status: SHIPPED | OUTPATIENT
Start: 2025-03-31

## 2025-03-31 NOTE — TELEPHONE ENCOUNTER
Upcoming Appts  With Family Medicine ([facility])  08/27/2025 at 1:00 PM  Last Office Visit - This Dept  2/24/2025 Tung Arias APRN    Uds 12/11/24

## 2025-04-30 DIAGNOSIS — I10 ESSENTIAL HYPERTENSION: ICD-10-CM

## 2025-04-30 DIAGNOSIS — K21.9 GASTROESOPHAGEAL REFLUX DISEASE WITHOUT ESOPHAGITIS: ICD-10-CM

## 2025-04-30 DIAGNOSIS — E11.65 TYPE 2 DIABETES MELLITUS WITH HYPERGLYCEMIA, WITHOUT LONG-TERM CURRENT USE OF INSULIN: ICD-10-CM

## 2025-04-30 RX ORDER — FAMOTIDINE 40 MG/1
40 TABLET, FILM COATED ORAL 2 TIMES DAILY
Qty: 180 TABLET | Refills: 1 | Status: SHIPPED | OUTPATIENT
Start: 2025-04-30

## 2025-04-30 RX ORDER — SPIRONOLACTONE 25 MG/1
25 TABLET ORAL DAILY
Qty: 90 TABLET | Refills: 1 | Status: SHIPPED | OUTPATIENT
Start: 2025-04-30

## 2025-04-30 RX ORDER — SEMAGLUTIDE 0.68 MG/ML
INJECTION, SOLUTION SUBCUTANEOUS
Qty: 3 ML | Refills: 1 | Status: SHIPPED | OUTPATIENT
Start: 2025-04-30

## 2025-05-22 ENCOUNTER — OFFICE VISIT (OUTPATIENT)
Dept: GASTROENTEROLOGY | Facility: CLINIC | Age: 60
End: 2025-05-22
Payer: COMMERCIAL

## 2025-05-22 VITALS
WEIGHT: 235 LBS | BODY MASS INDEX: 41.64 KG/M2 | SYSTOLIC BLOOD PRESSURE: 118 MMHG | OXYGEN SATURATION: 100 % | HEART RATE: 65 BPM | HEIGHT: 63 IN | DIASTOLIC BLOOD PRESSURE: 72 MMHG

## 2025-05-22 DIAGNOSIS — K58.9 IRRITABLE BOWEL SYNDROME, UNSPECIFIED TYPE: Primary | ICD-10-CM

## 2025-05-22 PROCEDURE — 99213 OFFICE O/P EST LOW 20 MIN: CPT

## 2025-05-22 NOTE — PROGRESS NOTES
Chief Complaint  Follow-up; Irritable bowel syndrome, unspecified type (Primary); Constipation; Diarrhea; and Abdominal Pain (Left side abdominal pain )    Marlo Barney is a 59 y.o. female who presents to [facility] for follow-up.    History of present Illness  Established care 5/17/2024 for nausea, LLQ pain, and constipation. Bowel movement every 3-4 days. Heartburn controlled with pepcid 40mg daily. Recommended Miralax, consider Linzess if no relief.   CT abdomen/pelvis 9/11/2023 for LLQ pain - 8mm nodule in left lower lung lobe  Last EGD/Colonoscopy 10/11/23 - normal EGD. 5mm tubular adenoma polyp in transverse colon otherwise normal.   Gastric emptying study 6/3/24 - normal  Patient call 6/4/24 - persistent constipation, minimal relief with Miralax. Linzess 72 prescribed.  Ultimately increased to 145.    Patient presents to the office for follow-up. Currently taking Linzess 72 but still can go 2-3 days between bowel movements. Can be formed followed by diarrhea. Has left sided abdominal pain. Denies melena and hematochezia.     Past Medical History:   Diagnosis Date    Anxiety     Arthritis     Asthma 2006    Ocassional attacks  mostly environmental triggers    Cholelithiasis 1995    Removed 1996    Depression     Diabetes mellitus     Diverticulitis     Diverticulosis 2002    [place]    GERD (gastroesophageal reflux disease)     Headache     HL (hearing loss) 2014    Left ear, hear only about 30%    Hyperlipidemia     Hypertension     Inflammatory bowel disease     Irritable bowel syndrome     02/2023    Low back pain     Obesity All my life       Past Surgical History:   Procedure Laterality Date    BARIATRIC SURGERY  1986    CHOLECYSTECTOMY  1996    COLONOSCOPY      ENDOMETRIAL ABLATION      GASTRIC BYPASS  1986    HERNIA REPAIR      TONSILLECTOMY      UPPER GASTROINTESTINAL ENDOSCOPY           Current Outpatient Medications:     Accu-Chek Guide Test test  strip, use to test AS DIRECTED for elevated sugar, Disp: , Rfl:     Accu-Chek Softclix Lancets lancets, use to test elevated sugar AS NEEDED, Disp: , Rfl:     acetaminophen (TYLENOL) 650 MG 8 hr tablet, Take 2 tablets by mouth 2 (Two) Times a Day., Disp: , Rfl:     amLODIPine (NORVASC) 10 MG tablet, TAKE 1 TABLET BY MOUTH EVERY DAY, Disp: 90 tablet, Rfl: 1    aspirin 81 MG EC tablet, aspirin 81 mg oral tablet,delayed release (DR/EC) take 1 tablet (81 mg) by oral route once daily   Active, Disp: , Rfl:     atorvastatin (LIPITOR) 10 MG tablet, TAKE 1 TABLET NIGHTLY AT   BEDTIME, Disp: 90 tablet, Rfl: 1    benazepril (LOTENSIN) 40 MG tablet, TAKE 1 TABLET BY MOUTH EVERY DAY, Disp: 90 tablet, Rfl: 1    Blood Glucose Monitoring Suppl (Accu-Chek Guide Me) w/Device kit, USE AS DIRECTED AS NEEDED for elevated sugar, Disp: , Rfl:     busPIRone (BUSPAR) 10 MG tablet, Take 1 tablet by mouth 3 (Three) Times a Day., Disp: 270 tablet, Rfl: 1    Calcium Carbonate (Calcium 600) 1500 (600 Ca) MG tablet, , Disp: , Rfl:     carvedilol (COREG) 12.5 MG tablet, TAKE 1 TABLET TWICE DAILY  WITH MEALS, Disp: 180 tablet, Rfl: 1    CBD oil (cannabidiol) capsule, Take  by mouth., Disp: , Rfl:     Cholecalciferol 50 MCG (2000 UT) capsule, Vitamin D3 50 mcg (2,000 unit) oral capsule take 1 capsule by oral route daily   Active, Disp: , Rfl:     Cobalamin Combinations (B-12) 100-5000 MCG sublingual tablet, , Disp: , Rfl:     etodolac XL (LODINE XL) 400 MG 24 hr tablet, TAKE 1 TABLET DAILY, Disp: 90 tablet, Rfl: 1    famotidine (PEPCID) 40 MG tablet, TAKE 1 TABLET BY MOUTH TWICE DAILY, Disp: 180 tablet, Rfl: 1    glucose monitor monitoring kit, Use 1 each As Needed (elevated sugar)., Disp: 1 each, Rfl: 0    Iron, Ferrous Sulfate, 325 (65 Fe) MG tablet, , Disp: , Rfl:     lidocaine (LIDODERM) 5 %, PLACE 1 PATCH ON THE SKIN AS DIRECTED BY PROVIDER DAILY. REMOVE & DISCARD PATCH WITHIN 12 HOURS OR AS DIRECTED BY MD (Patient taking differently: Place 1  "patch on the skin as directed by provider Daily As Needed for Severe Pain. Remove & Discard patch within 12 hours or as directed by MD), Disp: 90 patch, Rfl: 1    Multiple Vitamins-Minerals (MULTIVIT/MULTIMINERAL ADULT PO), , Disp: , Rfl:     Ozempic, 0.25 or 0.5 MG/DOSE, 2 MG/3ML solution pen-injector, INJECT 0.25mg SUBCUTANEOUSLY ONCE WEEKLY **56 day supply**, Disp: 3 mL, Rfl: 1    Psyllium 0.36 g capsule, , Disp: , Rfl:     spironolactone (ALDACTONE) 25 MG tablet, TAKE 1 TABLET BY MOUTH DAILY, Disp: 90 tablet, Rfl: 1    traMADol (ULTRAM) 50 MG tablet, Take 1 tablet by mouth Every 8 (Eight) Hours As Needed for Moderate Pain., Disp: 270 tablet, Rfl: 0    linaclotide (Linzess) 290 MCG capsule capsule, Take 1 capsule by mouth Every Morning Before Breakfast for 90 days., Disp: 90 capsule, Rfl: 3     No Known Allergies    Family History   Problem Relation Age of Onset    Diabetes Mother     Hyperlipidemia Mother     Heart disease Mother     Hypertension Mother     Hearing loss Mother     Asthma Mother     Heart disease Father     COPD Father     Diabetes Maternal Aunt     Kidney disease Maternal Aunt     Diabetes Paternal Aunt     Heart disease Maternal Grandmother     Diabetes Maternal Grandmother     Leukemia Maternal Grandfather     Stroke Maternal Grandfather     Thyroid disease Paternal Grandmother     Myelodysplastic syndrome Paternal Grandmother     Cerebral aneurysm Paternal Grandfather     Heart disease Paternal Grandfather     Stroke Paternal Grandfather     Colon cancer Neg Hx         Social History     Social History Narrative    Not on file       Objective       Vital Signs:   /72 (BP Location: Left arm, Patient Position: Sitting, Cuff Size: Adult)   Pulse 65   Ht 160 cm (62.99\")   Wt 107 kg (235 lb)   SpO2 100%   BMI 41.64 kg/m²       Physical Exam  Constitutional:       Appearance: Normal appearance. She is normal weight.   HENT:      Head: Normocephalic and atraumatic.      Nose: Nose normal. "   Pulmonary:      Effort: Pulmonary effort is normal.   Skin:     General: Skin is warm and dry.   Neurological:      Mental Status: She is alert and oriented to person, place, and time. Mental status is at baseline.   Psychiatric:         Mood and Affect: Mood normal.         Behavior: Behavior normal.         Thought Content: Thought content normal.         Judgment: Judgment normal.         Result Review :       CBC w/diff          11/21/2024    09:00   CBC w/Diff   WBC 9.21    RBC 4.41    Hemoglobin 13.7    Hematocrit 40.9    MCV 92.7    MCH 31.1    MCHC 33.5    RDW 12.2    Platelets 273    Neutrophil Rel % 71.7    Immature Granulocyte Rel % 0.4    Lymphocyte Rel % 21.1    Monocyte Rel % 6.1    Eosinophil Rel % 0.5    Basophil Rel % 0.2      CMP          11/21/2024    09:00   CMP   Glucose 207    BUN 18    Creatinine 0.91    EGFR 73.7    Sodium 137    Potassium 4.7    Chloride 102    Calcium 9.6    Total Protein 6.9    Albumin 3.8    Globulin 3.1    Total Bilirubin 0.5    Alkaline Phosphatase 43    AST (SGOT) 10    ALT (SGPT) 15    Albumin/Globulin Ratio 1.2    BUN/Creatinine Ratio 19.8    Anion Gap 9.3                Assessment and Plan    Diagnoses and all orders for this visit:    1. Irritable bowel syndrome, unspecified type (Primary)    Other orders  -     linaclotide (Linzess) 290 MCG capsule capsule; Take 1 capsule by mouth Every Morning Before Breakfast for 90 days.  Dispense: 90 capsule; Refill: 3      To improve frequency of bowel movements we will proceed with increased dose of Linzess 290 to see if this can effectively manage IBS symptoms.   Due for screening colonoscopy in 2028    Follow Up   Return in about 6 months (around 11/22/2025).  Patient was given instructions and counseling regarding her condition or for health maintenance advice. Please see specific information pulled into the AVS if appropriate.

## 2025-06-09 ENCOUNTER — OFFICE VISIT (OUTPATIENT)
Dept: ORTHOPEDIC SURGERY | Facility: CLINIC | Age: 60
End: 2025-06-09
Payer: COMMERCIAL

## 2025-06-09 VITALS — HEIGHT: 63 IN | BODY MASS INDEX: 41.64 KG/M2 | WEIGHT: 235 LBS

## 2025-06-09 DIAGNOSIS — G56.03 CARPAL TUNNEL SYNDROME, BILATERAL: Primary | ICD-10-CM

## 2025-06-09 RX ORDER — TRIAMCINOLONE ACETONIDE 40 MG/ML
40 INJECTION, SUSPENSION INTRA-ARTICULAR; INTRAMUSCULAR
Status: COMPLETED | OUTPATIENT
Start: 2025-06-09 | End: 2025-06-09

## 2025-06-09 RX ORDER — LIDOCAINE HYDROCHLORIDE 10 MG/ML
1 INJECTION, SOLUTION INFILTRATION; PERINEURAL
Status: COMPLETED | OUTPATIENT
Start: 2025-06-09 | End: 2025-06-09

## 2025-06-09 RX ADMIN — LIDOCAINE HYDROCHLORIDE 1 ML: 10 INJECTION, SOLUTION INFILTRATION; PERINEURAL at 14:07

## 2025-06-09 RX ADMIN — TRIAMCINOLONE ACETONIDE 40 MG: 40 INJECTION, SUSPENSION INTRA-ARTICULAR; INTRAMUSCULAR at 14:07

## 2025-06-09 NOTE — PROGRESS NOTES
"Chief Complaint  Follow-up of the Left Hand and Follow-up of the Right Hand    Subjective          Marlo Barney presents to [facility] for   History of Present Illness    Marlo returns today for follow-up of her bilateral hands.  She has bilateral carpal tunnel syndrome.  We have been treating her nonoperatively.  She had injections in the past with some relief.  She has noticed some straightening and locking in extension of her index and long finger on the right hand.  The right DIP joint is the worst.  They do not lock in flexion.  No pain in the palm.    No Known Allergies     Social History     Socioeconomic History    Marital status:    Tobacco Use    Smoking status: Former     Current packs/day: 0.00     Average packs/day: 0.3 packs/day for 2.0 years (0.5 ttl pk-yrs)     Types: Cigarettes     Start date: 1985     Quit date: 1987     Years since quittin.8     Passive exposure: Past    Smokeless tobacco: Never    Tobacco comments:     2nd hand smoke most of life   Vaping Use    Vaping status: Never Used   Substance and Sexual Activity    Alcohol use: Yes     Comment: Very ocassional 1 to 2 drinks every other month    Drug use: Never    Sexual activity: Not Currently     Partners: Male     Comment: Oblation         I reviewed the patient's chief complaint, history of present illness, review of systems, past medical history, surgical history, family history, social history, medications, and allergy list.     REVIEW OF SYSTEMS    Constitutional: Denies fevers, chills, weight loss  Cardiovascular: Denies chest pain, shortness of breath  Skin: Denies rashes, acute skin changes  Neurologic: Denies headache, loss of consciousness  MSK: Bilateral hand pain      Objective   Vital Signs:   Ht 160 cm (62.99\")   Wt 107 kg (235 lb)   BMI 41.64 kg/m²     Body mass index is 41.64 kg/m².    Physical Exam    General: Alert. No acute distress.   Bilateral hands: " No wounds.  Mild muscle atrophy.  Positive Phalen's compression.  Baseline sensation intact.  No triggering.  Nontender over the A1 pulleys.  Palpable radial pulse.      RIGHT CARPAL TUNNEL  Date/Time: 6/9/2025 2:07 PM  Consent given by: patient  Site marked: site marked  Timeout: Immediately prior to procedure a time out was called to verify the correct patient, procedure, equipment, support staff and site/side marked as required   Procedure Details  Location: wrist - Wrist joint: RIGHT CARPAL TUNNEL.  Preparation: Patient was prepped and draped in the usual sterile fashion  Needle size: 23 G  Medications administered: 1 mL lidocaine 1 %; 40 mg triamcinolone acetonide 40 MG/ML  Patient tolerance: patient tolerated the procedure well with no immediate complications      LEFT CARPAL TUNNEL  Date/Time: 6/9/2025 2:07 PM  Consent given by: patient  Site marked: site marked  Timeout: Immediately prior to procedure a time out was called to verify the correct patient, procedure, equipment, support staff and site/side marked as required   Procedure Details  Location: wrist - Wrist joint: LEFT CARPAL TUNNEL.  Preparation: Patient was prepped and draped in the usual sterile fashion  Needle size: 23 G  Medications administered: 1 mL lidocaine 1 %; 40 mg triamcinolone acetonide 40 MG/ML  Patient tolerance: patient tolerated the procedure well with no immediate complications    This injection documentation was Scribed for Tereso Coppola MD by Rika Morris MA.  06/09/25   14:08 EDT    Imaging Results (Most Recent)       None                     Assessment and Plan        No results found.     Diagnoses and all orders for this visit:    1. Carpal tunnel syndrome, bilateral (Primary)        We discussed nonoperative management.  We discussed the risk, benefits, indications, and alternatives to bilateral carpal tunnel steroid injections.  She elected proceed and tolerated the injections well.  Continue bracing and home exercises.   We discussed carpal tunnel release surgery but she would like to wait at this time.  She will follow-up as needed.      Call or return if worsening symptoms.    Scribed for Tereso Coppola MD by Tereso Coppola MD  06/09/2025   13:07 EDT         Follow Up       As needed    Patient was given instructions and counseling regarding her condition or for health maintenance advice. Please see specific information pulled into the AVS if appropriate.     I have personally performed the services described in this document as scribed by the above individual and it is both accurate and complete. Tereso Coppola MD 06/09/25 13:09 EDT

## 2025-07-09 DIAGNOSIS — M54.9 CHRONIC BACK PAIN, UNSPECIFIED BACK LOCATION, UNSPECIFIED BACK PAIN LATERALITY: ICD-10-CM

## 2025-07-09 DIAGNOSIS — G89.29 CHRONIC BACK PAIN, UNSPECIFIED BACK LOCATION, UNSPECIFIED BACK PAIN LATERALITY: ICD-10-CM

## 2025-07-09 RX ORDER — TRAMADOL HYDROCHLORIDE 50 MG/1
50 TABLET ORAL EVERY 8 HOURS PRN
Qty: 90 TABLET | Refills: 0 | Status: SHIPPED | OUTPATIENT
Start: 2025-07-09

## 2025-07-09 NOTE — TELEPHONE ENCOUNTER
Upcoming Appts  With Family Medicine (HARLEY Dixon)  08/27/2025 at 1:00 PM  Last Office Visit - This Dept  2/24/2025 Tung Arias APRN    [facility] 12/11/2024

## 2025-07-21 ENCOUNTER — OFFICE VISIT (OUTPATIENT)
Dept: ORTHOPEDIC SURGERY | Facility: CLINIC | Age: 60
End: 2025-07-21
Payer: COMMERCIAL

## 2025-07-21 VITALS — BODY MASS INDEX: 41.64 KG/M2 | WEIGHT: 235 LBS | HEIGHT: 63 IN

## 2025-07-21 DIAGNOSIS — M70.62 TROCHANTERIC BURSITIS OF LEFT HIP: Primary | ICD-10-CM

## 2025-07-21 DIAGNOSIS — M70.61 TROCHANTERIC BURSITIS OF RIGHT HIP: ICD-10-CM

## 2025-07-21 PROCEDURE — 20610 DRAIN/INJ JOINT/BURSA W/O US: CPT | Performed by: STUDENT IN AN ORGANIZED HEALTH CARE EDUCATION/TRAINING PROGRAM

## 2025-07-21 RX ORDER — LIDOCAINE HYDROCHLORIDE 10 MG/ML
5 INJECTION, SOLUTION INFILTRATION; PERINEURAL
Status: COMPLETED | OUTPATIENT
Start: 2025-07-21 | End: 2025-07-21

## 2025-07-21 RX ORDER — TRIAMCINOLONE ACETONIDE 40 MG/ML
40 INJECTION, SUSPENSION INTRA-ARTICULAR; INTRAMUSCULAR
Status: COMPLETED | OUTPATIENT
Start: 2025-07-21 | End: 2025-07-21

## 2025-07-21 RX ADMIN — LIDOCAINE HYDROCHLORIDE 5 ML: 10 INJECTION, SOLUTION INFILTRATION; PERINEURAL at 13:14

## 2025-07-21 RX ADMIN — TRIAMCINOLONE ACETONIDE 40 MG: 40 INJECTION, SUSPENSION INTRA-ARTICULAR; INTRAMUSCULAR at 13:15

## 2025-07-21 RX ADMIN — LIDOCAINE HYDROCHLORIDE 5 ML: 10 INJECTION, SOLUTION INFILTRATION; PERINEURAL at 13:15

## 2025-07-21 RX ADMIN — TRIAMCINOLONE ACETONIDE 40 MG: 40 INJECTION, SUSPENSION INTRA-ARTICULAR; INTRAMUSCULAR at 13:14

## 2025-07-21 NOTE — PROGRESS NOTES
"Chief Complaint  Follow-up of the Left Hip and Follow-up of the Right Hip    Subjective          Marlo Barney presents to [facility] for a follow up for bilateral hip.     History of Present Illness    The patient presents here today for a follow up for bilateral hip. She has been treating her bilateral hip bursitis conservatively. She was last seen in the office on 10/30/24 where she had bilateral hip bursitis injections. She states these injections gave her great relief but have recently worn off. She is having increase in pain and is requesting repeat injections today in the office. She denies any new injury or falls since her last visit.     No Known Allergies     Social History     Socioeconomic History    Marital status:    Tobacco Use    Smoking status: Former     Current packs/day: 0.00     Average packs/day: 0.3 packs/day for 2.0 years (0.5 ttl pk-yrs)     Types: Cigarettes     Start date: 1985     Quit date: 1987     Years since quittin.9     Passive exposure: Past    Smokeless tobacco: Never    Tobacco comments:     2nd hand smoke most of life   Vaping Use    Vaping status: Never Used   Substance and Sexual Activity    Alcohol use: Yes     Comment: Very ocassional 1 to 2 drinks every other month    Drug use: Never    Sexual activity: Not Currently     Partners: Male     Comment: Oblation         I reviewed the patient's chief complaint, history of present illness, review of systems, past medical history, surgical history, family history, social history, medications, and allergy list.     REVIEW OF SYSTEMS    Constitutional: Denies fevers, chills, weight loss  Cardiovascular: Denies chest pain, shortness of breath  Skin: Denies rashes, acute skin changes  Neurologic: Denies headache, loss of consciousness  MSK: bilateral hip pain       Objective   Vital Signs:   Ht 160 cm (62.99\")   Wt 107 kg (235 lb)   BMI 41.64 kg/m²     Body mass index is " 41.64 kg/m².    Physical Exam    General: Alert. No acute distress.   Bilateral lower extremity: hip flexion to 90 degrees, external rotation to 45 degrees, internal rotation to 20 degrees, tender to the lateral hip, 5/5 hip flexion and abduction, distal neurovascularly intact, calf soft, positive pulses, positive EHL, FHL, GS, and TA. Sensation intact to all 5 nerves of the foot.     Large Joint: R greater trochanteric bursa  Date/Time: 7/21/2025 1:14 PM  Consent given by: patient  Site marked: site marked  Timeout: Immediately prior to procedure a time out was called to verify the correct patient, procedure, equipment, support staff and site/side marked as required   Supporting Documentation  Indications: pain   Procedure Details  Location: hip - R greater trochanteric bursa  Preparation: Patient was prepped and draped in the usual sterile fashion  Needle gauge: 21 G.  Medications administered: 5 mL lidocaine 1 %; 40 mg triamcinolone acetonide 40 MG/ML  Patient tolerance: patient tolerated the procedure well with no immediate complications      Large Joint: L greater trochanteric bursa  Date/Time: 7/21/2025 1:15 PM  Consent given by: patient  Site marked: site marked  Timeout: Immediately prior to procedure a time out was called to verify the correct patient, procedure, equipment, support staff and site/side marked as required   Supporting Documentation  Indications: pain   Procedure Details  Location: hip - L greater trochanteric bursa  Preparation: Patient was prepped and draped in the usual sterile fashion  Needle gauge: 21 G.  Medications administered: 5 mL lidocaine 1 %; 40 mg triamcinolone acetonide 40 MG/ML  Patient tolerance: patient tolerated the procedure well with no immediate complications    This injection documentation was Scribed for Tereso Coppola MD by Rika Morris MA.  07/21/25   13:15 EDT    Imaging Results (Most Recent)       None                     Assessment and Plan        No results found.      Diagnoses and all orders for this visit:    1. Trochanteric bursitis of left hip (Primary)    2. Trochanteric bursitis of right hip        The patient presents here today for a follow up for bilateral hip bursitis.     Discussed the risks and benefits of bilateral hip steroid injections today in the office. Patient expressed understanding and wishes to proceed. Patient tolerted the injection well and without any complications.     Patient will continue home exercises and will continue over the counter medications for pain control.     Call or return if worsening symptoms.    Scribed for Tereso Coppola MD by Tala Acevedo  07/21/2025   13:08 EDT         Follow Up     PRN     Patient was given instructions and counseling regarding her condition or for health maintenance advice. Please see specific information pulled into the AVS if appropriate.     I have personally performed the services described in this document as scribed by the above individual and it is both accurate and complete. Tereso Coppola MD 07/21/25 13:20 EDT

## 2025-07-22 ENCOUNTER — HOSPITAL ENCOUNTER (OUTPATIENT)
Dept: BONE DENSITY | Facility: HOSPITAL | Age: 60
Discharge: HOME | End: 2025-07-22
Payer: COMMERCIAL

## 2025-07-22 ENCOUNTER — HOSPITAL ENCOUNTER (OUTPATIENT)
Dept: MAMMOGRAPHY | Facility: HOSPITAL | Age: 60
Discharge: HOME | End: 2025-07-22
Payer: COMMERCIAL

## 2025-07-22 ENCOUNTER — HOSPITAL ENCOUNTER (OUTPATIENT)
Dept: MRI IMAGING | Facility: HOSPITAL | Age: 60
Discharge: HOME | End: 2025-07-22
Payer: COMMERCIAL

## 2025-07-22 DIAGNOSIS — Z78.0 POST-MENOPAUSE: ICD-10-CM

## 2025-07-22 DIAGNOSIS — Z12.31 ENCOUNTER FOR SCREENING MAMMOGRAM FOR MALIGNANT NEOPLASM OF BREAST: ICD-10-CM

## 2025-07-22 DIAGNOSIS — G89.29 CHRONIC BILATERAL LOW BACK PAIN WITH LEFT-SIDED SCIATICA: ICD-10-CM

## 2025-07-22 DIAGNOSIS — M54.42 CHRONIC BILATERAL LOW BACK PAIN WITH LEFT-SIDED SCIATICA: ICD-10-CM

## 2025-07-22 PROCEDURE — 77080 DXA BONE DENSITY AXIAL: CPT

## 2025-07-22 PROCEDURE — 77067 SCR MAMMO BI INCL CAD: CPT

## 2025-07-22 PROCEDURE — 77063 BREAST TOMOSYNTHESIS BI: CPT

## 2025-07-22 PROCEDURE — 72148 MRI LUMBAR SPINE W/O DYE: CPT

## 2025-07-23 ENCOUNTER — RESULTS FOLLOW-UP (OUTPATIENT)
Dept: FAMILY MEDICINE CLINIC | Facility: CLINIC | Age: 60
End: 2025-07-23
Payer: COMMERCIAL

## 2025-07-23 DIAGNOSIS — M54.9 CHRONIC BACK PAIN, UNSPECIFIED BACK LOCATION, UNSPECIFIED BACK PAIN LATERALITY: Primary | ICD-10-CM

## 2025-07-23 DIAGNOSIS — G89.29 CHRONIC BACK PAIN, UNSPECIFIED BACK LOCATION, UNSPECIFIED BACK PAIN LATERALITY: Primary | ICD-10-CM

## 2025-07-24 DIAGNOSIS — K21.9 GASTROESOPHAGEAL REFLUX DISEASE WITHOUT ESOPHAGITIS: ICD-10-CM

## 2025-07-24 DIAGNOSIS — I10 ESSENTIAL HYPERTENSION: ICD-10-CM

## 2025-07-24 RX ORDER — AMLODIPINE BESYLATE 10 MG/1
10 TABLET ORAL DAILY
Qty: 90 TABLET | Refills: 1 | Status: SHIPPED | OUTPATIENT
Start: 2025-07-24

## 2025-07-24 RX ORDER — FAMOTIDINE 40 MG/1
40 TABLET, FILM COATED ORAL 2 TIMES DAILY
Qty: 180 TABLET | Refills: 1 | Status: SHIPPED | OUTPATIENT
Start: 2025-07-24

## 2025-07-24 RX ORDER — BENAZEPRIL HYDROCHLORIDE 40 MG/1
40 TABLET ORAL DAILY
Qty: 90 TABLET | Refills: 1 | Status: SHIPPED | OUTPATIENT
Start: 2025-07-24

## 2025-08-03 DIAGNOSIS — I10 ESSENTIAL HYPERTENSION: ICD-10-CM

## 2025-08-03 DIAGNOSIS — M54.50 CHRONIC LOW BACK PAIN, UNSPECIFIED BACK PAIN LATERALITY, UNSPECIFIED WHETHER SCIATICA PRESENT: ICD-10-CM

## 2025-08-03 DIAGNOSIS — M54.2 CERVICALGIA: ICD-10-CM

## 2025-08-03 DIAGNOSIS — E78.2 MIXED HYPERLIPIDEMIA: ICD-10-CM

## 2025-08-03 DIAGNOSIS — G89.29 CHRONIC LOW BACK PAIN, UNSPECIFIED BACK PAIN LATERALITY, UNSPECIFIED WHETHER SCIATICA PRESENT: ICD-10-CM

## 2025-08-04 ENCOUNTER — OFFICE VISIT (OUTPATIENT)
Dept: ORTHOPEDIC SURGERY | Facility: CLINIC | Age: 60
End: 2025-08-04
Payer: COMMERCIAL

## 2025-08-04 VITALS — BODY MASS INDEX: 39.87 KG/M2 | HEART RATE: 63 BPM | WEIGHT: 225 LBS | HEIGHT: 63 IN | OXYGEN SATURATION: 98 %

## 2025-08-04 DIAGNOSIS — M75.42 IMPINGEMENT SYNDROME OF LEFT SHOULDER: ICD-10-CM

## 2025-08-04 DIAGNOSIS — M25.512 LEFT SHOULDER PAIN, UNSPECIFIED CHRONICITY: ICD-10-CM

## 2025-08-04 DIAGNOSIS — M25.511 RIGHT SHOULDER PAIN, UNSPECIFIED CHRONICITY: Primary | ICD-10-CM

## 2025-08-04 DIAGNOSIS — M75.41 IMPINGEMENT SYNDROME OF RIGHT SHOULDER: ICD-10-CM

## 2025-08-04 RX ORDER — ATORVASTATIN CALCIUM 10 MG/1
10 TABLET, FILM COATED ORAL NIGHTLY
Qty: 90 TABLET | Refills: 1 | Status: SHIPPED | OUTPATIENT
Start: 2025-08-04

## 2025-08-04 RX ORDER — LIDOCAINE HYDROCHLORIDE 10 MG/ML
5 INJECTION, SOLUTION INFILTRATION; PERINEURAL
Status: COMPLETED | OUTPATIENT
Start: 2025-08-04 | End: 2025-08-04

## 2025-08-04 RX ORDER — TRIAMCINOLONE ACETONIDE 40 MG/ML
40 INJECTION, SUSPENSION INTRA-ARTICULAR; INTRAMUSCULAR
Status: COMPLETED | OUTPATIENT
Start: 2025-08-04 | End: 2025-08-04

## 2025-08-04 RX ORDER — ETODOLAC 400 MG/1
400 TABLET, EXTENDED RELEASE ORAL DAILY
Qty: 90 TABLET | Refills: 1 | Status: SHIPPED | OUTPATIENT
Start: 2025-08-04

## 2025-08-04 RX ORDER — CARVEDILOL 12.5 MG/1
12.5 TABLET ORAL 2 TIMES DAILY WITH MEALS
Qty: 180 TABLET | Refills: 1 | Status: SHIPPED | OUTPATIENT
Start: 2025-08-04

## 2025-08-04 RX ADMIN — LIDOCAINE HYDROCHLORIDE 5 ML: 10 INJECTION, SOLUTION INFILTRATION; PERINEURAL at 13:43

## 2025-08-04 RX ADMIN — TRIAMCINOLONE ACETONIDE 40 MG: 40 INJECTION, SUSPENSION INTRA-ARTICULAR; INTRAMUSCULAR at 13:42

## 2025-08-04 RX ADMIN — TRIAMCINOLONE ACETONIDE 40 MG: 40 INJECTION, SUSPENSION INTRA-ARTICULAR; INTRAMUSCULAR at 13:43

## 2025-08-04 RX ADMIN — LIDOCAINE HYDROCHLORIDE 5 ML: 10 INJECTION, SOLUTION INFILTRATION; PERINEURAL at 13:42

## 2025-08-14 ENCOUNTER — OFFICE VISIT (OUTPATIENT)
Dept: NEUROSURGERY | Facility: CLINIC | Age: 60
End: 2025-08-14
Payer: COMMERCIAL

## 2025-08-14 VITALS
WEIGHT: 223 LBS | HEIGHT: 63 IN | HEART RATE: 62 BPM | DIASTOLIC BLOOD PRESSURE: 59 MMHG | SYSTOLIC BLOOD PRESSURE: 104 MMHG | BODY MASS INDEX: 39.51 KG/M2

## 2025-08-14 DIAGNOSIS — M47.816 LUMBAR SPONDYLOSIS: ICD-10-CM

## 2025-08-14 DIAGNOSIS — M48.061 FORAMINAL STENOSIS OF LUMBAR REGION: Primary | ICD-10-CM

## 2025-08-14 DIAGNOSIS — M43.16 SPONDYLOLISTHESIS AT L4-L5 LEVEL: ICD-10-CM

## 2025-08-15 ENCOUNTER — PATIENT ROUNDING (BHMG ONLY) (OUTPATIENT)
Dept: NEUROSURGERY | Facility: CLINIC | Age: 60
End: 2025-08-15
Payer: COMMERCIAL

## 2025-08-20 ENCOUNTER — PATIENT MESSAGE (OUTPATIENT)
Dept: FAMILY MEDICINE CLINIC | Facility: CLINIC | Age: 60
End: 2025-08-20
Payer: COMMERCIAL

## 2025-08-20 DIAGNOSIS — L65.9 HAIR LOSS: Primary | ICD-10-CM

## 2025-08-22 ENCOUNTER — LAB (OUTPATIENT)
Dept: LAB | Facility: HOSPITAL | Age: 60
End: 2025-08-22
Payer: COMMERCIAL

## 2025-08-22 DIAGNOSIS — G89.29 CHRONIC BACK PAIN, UNSPECIFIED BACK LOCATION, UNSPECIFIED BACK PAIN LATERALITY: ICD-10-CM

## 2025-08-22 DIAGNOSIS — M54.9 CHRONIC BACK PAIN, UNSPECIFIED BACK LOCATION, UNSPECIFIED BACK PAIN LATERALITY: ICD-10-CM

## 2025-08-22 RX ORDER — TRAMADOL HYDROCHLORIDE 50 MG/1
50 TABLET ORAL EVERY 8 HOURS PRN
Qty: 90 TABLET | Refills: 0 | Status: SHIPPED | OUTPATIENT
Start: 2025-08-22

## 2025-08-27 ENCOUNTER — OFFICE VISIT (OUTPATIENT)
Dept: FAMILY MEDICINE CLINIC | Facility: CLINIC | Age: 60
End: 2025-08-27
Payer: COMMERCIAL

## 2025-08-27 VITALS
HEIGHT: 63 IN | HEART RATE: 80 BPM | DIASTOLIC BLOOD PRESSURE: 60 MMHG | SYSTOLIC BLOOD PRESSURE: 90 MMHG | TEMPERATURE: 97.3 F | OXYGEN SATURATION: 99 % | RESPIRATION RATE: 18 BRPM | WEIGHT: 221.4 LBS | BODY MASS INDEX: 39.23 KG/M2

## 2025-08-27 DIAGNOSIS — Z51.81 MEDICATION MONITORING ENCOUNTER: Primary | ICD-10-CM

## 2025-08-27 DIAGNOSIS — G89.29 CHRONIC BACK PAIN, UNSPECIFIED BACK LOCATION, UNSPECIFIED BACK PAIN LATERALITY: ICD-10-CM

## 2025-08-27 DIAGNOSIS — E11.65 TYPE 2 DIABETES MELLITUS WITH HYPERGLYCEMIA, WITHOUT LONG-TERM CURRENT USE OF INSULIN: ICD-10-CM

## 2025-08-27 DIAGNOSIS — M54.9 CHRONIC BACK PAIN, UNSPECIFIED BACK LOCATION, UNSPECIFIED BACK PAIN LATERALITY: ICD-10-CM

## 2025-08-27 DIAGNOSIS — R41.3 MEMORY CHANGES: ICD-10-CM

## 2025-08-27 DIAGNOSIS — M54.2 CERVICALGIA: ICD-10-CM

## 2025-08-27 LAB
AMPHET+METHAMPHET UR QL: NEGATIVE
AMPHETAMINE INTERNAL CONTROL: ABNORMAL
AMPHETAMINES UR QL: NEGATIVE
BARBITURATE INTERNAL CONTROL: ABNORMAL
BARBITURATES UR QL SCN: NEGATIVE
BENZODIAZ UR QL SCN: NEGATIVE
BENZODIAZEPINE INTERNAL CONTROL: ABNORMAL
BUPRENORPHINE INTERNAL CONTROL: ABNORMAL
BUPRENORPHINE SERPL-MCNC: NEGATIVE NG/ML
CANNABINOIDS SERPL QL: POSITIVE
COCAINE INTERNAL CONTROL: ABNORMAL
COCAINE UR QL: NEGATIVE
EXPIRATION DATE: ABNORMAL
Lab: ABNORMAL
MDMA (ECSTASY) INTERNAL CONTROL: ABNORMAL
MDMA UR QL SCN: NEGATIVE
METHADONE INTERNAL CONTROL: ABNORMAL
METHADONE UR QL SCN: NEGATIVE
METHAMPHETAMINE INTERNAL CONTROL: ABNORMAL
MORPHINE INTERNAL CONTROL: ABNORMAL
MORPHINE/OPIATES SCREEN, URINE: NEGATIVE
OXYCODONE INTERNAL CONTROL: ABNORMAL
OXYCODONE UR QL SCN: NEGATIVE
PCP UR QL SCN: NEGATIVE
PHENCYCLIDINE INTERNAL CONTROL: ABNORMAL
THC INTERNAL CONTROL: ABNORMAL

## 2025-08-27 RX ORDER — TRAMADOL HYDROCHLORIDE 50 MG/1
50 TABLET ORAL EVERY 8 HOURS PRN
Qty: 270 TABLET | Refills: 0 | Status: SHIPPED | OUTPATIENT
Start: 2025-08-27

## 2025-08-27 RX ORDER — LINACLOTIDE 290 UG/1
290 CAPSULE, GELATIN COATED ORAL
COMMUNITY
Start: 2025-08-02